# Patient Record
Sex: FEMALE | Race: ASIAN
[De-identification: names, ages, dates, MRNs, and addresses within clinical notes are randomized per-mention and may not be internally consistent; named-entity substitution may affect disease eponyms.]

---

## 2017-03-22 ENCOUNTER — FORM ENCOUNTER (OUTPATIENT)
Age: 72
End: 2017-03-22

## 2017-03-23 ENCOUNTER — APPOINTMENT (OUTPATIENT)
Dept: MAMMOGRAPHY | Facility: CLINIC | Age: 72
End: 2017-03-23

## 2017-03-23 ENCOUNTER — OUTPATIENT (OUTPATIENT)
Dept: OUTPATIENT SERVICES | Facility: HOSPITAL | Age: 72
LOS: 1 days | End: 2017-03-23
Payer: MEDICARE

## 2017-03-23 DIAGNOSIS — Z00.8 ENCOUNTER FOR OTHER GENERAL EXAMINATION: ICD-10-CM

## 2017-03-23 PROCEDURE — 77067 SCR MAMMO BI INCL CAD: CPT

## 2017-03-23 PROCEDURE — 77063 BREAST TOMOSYNTHESIS BI: CPT

## 2018-02-06 ENCOUNTER — APPOINTMENT (OUTPATIENT)
Dept: ULTRASOUND IMAGING | Facility: CLINIC | Age: 73
End: 2018-02-06
Payer: MEDICARE

## 2018-02-06 ENCOUNTER — OUTPATIENT (OUTPATIENT)
Dept: OUTPATIENT SERVICES | Facility: HOSPITAL | Age: 73
LOS: 1 days | End: 2018-02-06
Payer: MEDICARE

## 2018-02-06 ENCOUNTER — APPOINTMENT (OUTPATIENT)
Dept: MAMMOGRAPHY | Facility: CLINIC | Age: 73
End: 2018-02-06
Payer: MEDICARE

## 2018-02-06 DIAGNOSIS — Z00.8 ENCOUNTER FOR OTHER GENERAL EXAMINATION: ICD-10-CM

## 2018-02-06 PROCEDURE — 76641 ULTRASOUND BREAST COMPLETE: CPT | Mod: 26,LT

## 2018-02-06 PROCEDURE — 76641 ULTRASOUND BREAST COMPLETE: CPT

## 2018-02-06 PROCEDURE — G0279: CPT | Mod: 26

## 2018-02-06 PROCEDURE — 77066 DX MAMMO INCL CAD BI: CPT | Mod: 26

## 2018-02-06 PROCEDURE — G0279: CPT

## 2018-02-06 PROCEDURE — 77066 DX MAMMO INCL CAD BI: CPT

## 2019-03-05 ENCOUNTER — APPOINTMENT (OUTPATIENT)
Dept: ULTRASOUND IMAGING | Facility: CLINIC | Age: 74
End: 2019-03-05
Payer: MEDICARE

## 2019-03-05 ENCOUNTER — OUTPATIENT (OUTPATIENT)
Dept: OUTPATIENT SERVICES | Facility: HOSPITAL | Age: 74
LOS: 1 days | End: 2019-03-05
Payer: MEDICARE

## 2019-03-05 ENCOUNTER — APPOINTMENT (OUTPATIENT)
Dept: RADIOLOGY | Facility: CLINIC | Age: 74
End: 2019-03-05
Payer: MEDICARE

## 2019-03-05 ENCOUNTER — APPOINTMENT (OUTPATIENT)
Dept: MAMMOGRAPHY | Facility: CLINIC | Age: 74
End: 2019-03-05
Payer: MEDICARE

## 2019-03-05 DIAGNOSIS — Z00.8 ENCOUNTER FOR OTHER GENERAL EXAMINATION: ICD-10-CM

## 2019-03-05 PROCEDURE — 77080 DXA BONE DENSITY AXIAL: CPT | Mod: 26

## 2019-03-05 PROCEDURE — 77063 BREAST TOMOSYNTHESIS BI: CPT | Mod: 26

## 2019-03-05 PROCEDURE — 76641 ULTRASOUND BREAST COMPLETE: CPT | Mod: 26,50

## 2019-03-05 PROCEDURE — 76641 ULTRASOUND BREAST COMPLETE: CPT

## 2019-03-05 PROCEDURE — 77063 BREAST TOMOSYNTHESIS BI: CPT

## 2019-03-05 PROCEDURE — 77067 SCR MAMMO BI INCL CAD: CPT | Mod: 26

## 2019-03-05 PROCEDURE — 77080 DXA BONE DENSITY AXIAL: CPT

## 2019-03-05 PROCEDURE — 77067 SCR MAMMO BI INCL CAD: CPT

## 2019-07-04 ENCOUNTER — INPATIENT (INPATIENT)
Facility: HOSPITAL | Age: 74
LOS: 4 days | Discharge: HOME | End: 2019-07-09
Attending: INTERNAL MEDICINE | Admitting: INTERNAL MEDICINE
Payer: MEDICARE

## 2019-07-04 VITALS — HEIGHT: 62.5 IN | WEIGHT: 149.91 LBS

## 2019-07-04 DIAGNOSIS — I44.1 ATRIOVENTRICULAR BLOCK, SECOND DEGREE: ICD-10-CM

## 2019-07-04 LAB
ADD ON TEST-SPECIMEN IN LAB: SIGNIFICANT CHANGE UP
ADD ON TEST-SPECIMEN IN LAB: SIGNIFICANT CHANGE UP
ALBUMIN SERPL ELPH-MCNC: 3.1 G/DL — LOW (ref 3.3–5)
ALP SERPL-CCNC: 139 U/L — HIGH (ref 40–120)
ALT FLD-CCNC: 56 U/L — SIGNIFICANT CHANGE UP (ref 12–78)
ANION GAP SERPL CALC-SCNC: 13 MMOL/L — SIGNIFICANT CHANGE UP (ref 5–17)
APPEARANCE UR: CLEAR — SIGNIFICANT CHANGE UP
APTT BLD: 34 SEC — SIGNIFICANT CHANGE UP (ref 27.5–36.3)
AST SERPL-CCNC: 47 U/L — HIGH (ref 15–37)
B BURGDOR C6 AB SER-ACNC: NEGATIVE — SIGNIFICANT CHANGE UP
B BURGDOR IGG+IGM SER-ACNC: 0.04 INDEX — SIGNIFICANT CHANGE UP (ref 0.01–0.89)
BACTERIA # UR AUTO: ABNORMAL
BASOPHILS # BLD AUTO: 0.02 K/UL — SIGNIFICANT CHANGE UP (ref 0–0.2)
BASOPHILS NFR BLD AUTO: 0.2 % — SIGNIFICANT CHANGE UP (ref 0–2)
BILIRUB SERPL-MCNC: 0.5 MG/DL — SIGNIFICANT CHANGE UP (ref 0.2–1.2)
BILIRUB UR-MCNC: NEGATIVE — SIGNIFICANT CHANGE UP
BUN SERPL-MCNC: 81 MG/DL — HIGH (ref 7–23)
CALCIUM SERPL-MCNC: 9.1 MG/DL — SIGNIFICANT CHANGE UP (ref 8.5–10.1)
CHLORIDE SERPL-SCNC: 105 MMOL/L — SIGNIFICANT CHANGE UP (ref 96–108)
CO2 SERPL-SCNC: 22 MMOL/L — SIGNIFICANT CHANGE UP (ref 22–31)
COLOR SPEC: YELLOW — SIGNIFICANT CHANGE UP
CREAT ?TM UR-MCNC: 94 MG/DL — SIGNIFICANT CHANGE UP
CREAT SERPL-MCNC: 5.51 MG/DL — HIGH (ref 0.5–1.3)
DIFF PNL FLD: ABNORMAL
EOSINOPHIL # BLD AUTO: 0.01 K/UL — SIGNIFICANT CHANGE UP (ref 0–0.5)
EOSINOPHIL NFR BLD AUTO: 0.1 % — SIGNIFICANT CHANGE UP (ref 0–6)
EPI CELLS # UR: SIGNIFICANT CHANGE UP
ERYTHROCYTE [SEDIMENTATION RATE] IN BLOOD: 95 MM/HR — HIGH (ref 0–20)
GLUCOSE SERPL-MCNC: 131 MG/DL — HIGH (ref 70–99)
GLUCOSE UR QL: NEGATIVE MG/DL — SIGNIFICANT CHANGE UP
HCT VFR BLD CALC: 35.8 % — SIGNIFICANT CHANGE UP (ref 34.5–45)
HGB BLD-MCNC: 11.5 G/DL — SIGNIFICANT CHANGE UP (ref 11.5–15.5)
IMM GRANULOCYTES NFR BLD AUTO: 0.4 % — SIGNIFICANT CHANGE UP (ref 0–1.5)
INR BLD: 1.11 RATIO — SIGNIFICANT CHANGE UP (ref 0.88–1.16)
KETONES UR-MCNC: NEGATIVE — SIGNIFICANT CHANGE UP
LEUKOCYTE ESTERASE UR-ACNC: ABNORMAL
LYMPHOCYTES # BLD AUTO: 0.69 K/UL — LOW (ref 1–3.3)
LYMPHOCYTES # BLD AUTO: 8.4 % — LOW (ref 13–44)
MAGNESIUM SERPL-MCNC: 2.4 MG/DL — SIGNIFICANT CHANGE UP (ref 1.6–2.6)
MCHC RBC-ENTMCNC: 29 PG — SIGNIFICANT CHANGE UP (ref 27–34)
MCHC RBC-ENTMCNC: 32.1 GM/DL — SIGNIFICANT CHANGE UP (ref 32–36)
MCV RBC AUTO: 90.4 FL — SIGNIFICANT CHANGE UP (ref 80–100)
MONOCYTES # BLD AUTO: 0.76 K/UL — SIGNIFICANT CHANGE UP (ref 0–0.9)
MONOCYTES NFR BLD AUTO: 9.3 % — SIGNIFICANT CHANGE UP (ref 2–14)
NEUTROPHILS # BLD AUTO: 6.68 K/UL — SIGNIFICANT CHANGE UP (ref 1.8–7.4)
NEUTROPHILS NFR BLD AUTO: 81.6 % — HIGH (ref 43–77)
NITRITE UR-MCNC: NEGATIVE — SIGNIFICANT CHANGE UP
NT-PROBNP SERPL-SCNC: 8937 PG/ML — HIGH (ref 0–450)
PH UR: 5 — SIGNIFICANT CHANGE UP (ref 5–8)
PLATELET # BLD AUTO: 199 K/UL — SIGNIFICANT CHANGE UP (ref 150–400)
POTASSIUM SERPL-MCNC: 4.4 MMOL/L — SIGNIFICANT CHANGE UP (ref 3.5–5.3)
POTASSIUM SERPL-SCNC: 4.4 MMOL/L — SIGNIFICANT CHANGE UP (ref 3.5–5.3)
PROT ?TM UR-MCNC: 37 MG/DL — HIGH (ref 0–12)
PROT SERPL-MCNC: 7.5 GM/DL — SIGNIFICANT CHANGE UP (ref 6–8.3)
PROT UR-MCNC: 30 MG/DL
PROT/CREAT UR-RTO: 0.4 RATIO — HIGH (ref 0–0.2)
PROTHROM AB SERPL-ACNC: 12.4 SEC — SIGNIFICANT CHANGE UP (ref 10–12.9)
RBC # BLD: 3.96 M/UL — SIGNIFICANT CHANGE UP (ref 3.8–5.2)
RBC # FLD: 12.5 % — SIGNIFICANT CHANGE UP (ref 10.3–14.5)
RBC CASTS # UR COMP ASSIST: ABNORMAL /HPF (ref 0–4)
SODIUM SERPL-SCNC: 140 MMOL/L — SIGNIFICANT CHANGE UP (ref 135–145)
SP GR SPEC: 1.01 — SIGNIFICANT CHANGE UP (ref 1.01–1.02)
TROPONIN I SERPL-MCNC: <0.015 NG/ML — SIGNIFICANT CHANGE UP (ref 0.01–0.04)
TROPONIN I SERPL-MCNC: <0.015 NG/ML — SIGNIFICANT CHANGE UP (ref 0.01–0.04)
TSH SERPL-MCNC: 0.86 UU/ML — SIGNIFICANT CHANGE UP (ref 0.34–4.82)
UROBILINOGEN FLD QL: NEGATIVE MG/DL — SIGNIFICANT CHANGE UP
WBC # BLD: 8.19 K/UL — SIGNIFICANT CHANGE UP (ref 3.8–10.5)
WBC # FLD AUTO: 8.19 K/UL — SIGNIFICANT CHANGE UP (ref 3.8–10.5)
WBC UR QL: ABNORMAL

## 2019-07-04 PROCEDURE — 71045 X-RAY EXAM CHEST 1 VIEW: CPT | Mod: 26

## 2019-07-04 PROCEDURE — 99221 1ST HOSP IP/OBS SF/LOW 40: CPT

## 2019-07-04 PROCEDURE — 93306 TTE W/DOPPLER COMPLETE: CPT | Mod: 26

## 2019-07-04 PROCEDURE — 76770 US EXAM ABDO BACK WALL COMP: CPT | Mod: 26

## 2019-07-04 PROCEDURE — 99291 CRITICAL CARE FIRST HOUR: CPT

## 2019-07-04 PROCEDURE — 93010 ELECTROCARDIOGRAM REPORT: CPT

## 2019-07-04 RX ORDER — CEFAZOLIN SODIUM 1 G
2000 VIAL (EA) INJECTION ONCE
Refills: 0 | Status: COMPLETED | OUTPATIENT
Start: 2019-07-05 | End: 2019-07-05

## 2019-07-04 RX ORDER — SODIUM CHLORIDE 9 MG/ML
1000 INJECTION INTRAMUSCULAR; INTRAVENOUS; SUBCUTANEOUS
Refills: 0 | Status: DISCONTINUED | OUTPATIENT
Start: 2019-07-04 | End: 2019-07-05

## 2019-07-04 RX ORDER — ATORVASTATIN CALCIUM 80 MG/1
40 TABLET, FILM COATED ORAL AT BEDTIME
Refills: 0 | Status: DISCONTINUED | OUTPATIENT
Start: 2019-07-04 | End: 2019-07-05

## 2019-07-04 RX ORDER — ACETAMINOPHEN 500 MG
650 TABLET ORAL ONCE
Refills: 0 | Status: COMPLETED | OUTPATIENT
Start: 2019-07-04 | End: 2019-07-04

## 2019-07-04 RX ORDER — ALBUTEROL 90 UG/1
2 AEROSOL, METERED ORAL EVERY 6 HOURS
Refills: 0 | Status: DISCONTINUED | OUTPATIENT
Start: 2019-07-04 | End: 2019-07-05

## 2019-07-04 RX ORDER — ACETAMINOPHEN 500 MG
650 TABLET ORAL EVERY 6 HOURS
Refills: 0 | Status: DISCONTINUED | OUTPATIENT
Start: 2019-07-04 | End: 2019-07-05

## 2019-07-04 RX ADMIN — Medication 650 MILLIGRAM(S): at 17:58

## 2019-07-04 RX ADMIN — SODIUM CHLORIDE 100 MILLILITER(S): 9 INJECTION INTRAMUSCULAR; INTRAVENOUS; SUBCUTANEOUS at 22:00

## 2019-07-04 RX ADMIN — SODIUM CHLORIDE 100 MILLILITER(S): 9 INJECTION INTRAMUSCULAR; INTRAVENOUS; SUBCUTANEOUS at 12:15

## 2019-07-04 RX ADMIN — ATORVASTATIN CALCIUM 40 MILLIGRAM(S): 80 TABLET, FILM COATED ORAL at 21:15

## 2019-07-04 NOTE — ED PROVIDER NOTE - CLINICAL SUMMARY MEDICAL DECISION MAKING FREE TEXT BOX
73F hx htn hld asthma presents to the ED for shortness of breath and weakness. Pt states that shortness of breath is present with exertion. states that when she walks to the mailbox she has to stop to catch her breath. mild cough. no sob when laying flat. no cp. no had or dizziness. no diaphoresis. states that she is having work done in her kitchen and there is dust. also states that 1 month ago she was bit by a tick but received doxycycline 1 dose. exam with bradycardic no LE swelling normal peripheral pulses. son at bedside is a physician at . spoke with patient and son regarding care - given new bradycardia concern for cardiac etiology. 1st degree block - will send lyme and silvia, touch base with cardiologist. lower suspicion asthma as normal breath sounds no wheezing not tight. will obtain xray cardiac monitor and reassess. Arya López M.D., Attending Physician

## 2019-07-04 NOTE — CONSULT NOTE ADULT - PROBLEM SELECTOR RECOMMENDATION 9
Admit to CCU via Hospitalist team  Serial enzymes/ EKGs/ labs/ monitoring/ lymes titer ordered and pending  Renal consultation initiated and in progress  Cardiology-Dr Wilhelm at bedside  R-2 pads to transcutaneous pacer at bedside, atropine prn  NPO after midnight for possible PPM implantation  IV fluids and hydration in progress  Echo ordered by Dr Wilhelm  Continue cardiac monitoring and CCU support  Will Discuss all of above with EP attending  Will continue to follow

## 2019-07-04 NOTE — ED ADULT TRIAGE NOTE - CHIEF COMPLAINT QUOTE
Patient presents complaining of shortness of breath, weakness, and "getting tired easily". Patient had tick bite on 6/14 and states that is when weakness and fatigue started. Patient states shortness of breath started 3 days ago and she states she thinks it may be due to the construction at her house and the dust. hx asthma.

## 2019-07-04 NOTE — ED PROVIDER NOTE - PROGRESS NOTE DETAILS
repeat EKG - pt in 3rd degree heart block - pads placed on patient. bp stable. cardiology paged. Arya López M.D., Attending Physician spoke with Dr. Wilhelm will see patient shortly. Arya López M.D., Attending Physician pt endorsed to DR. Pike for 3rd degree block pt to go to CCU. Arya López M.D., Attending Physician spoke with EP will see patient shortly. Arya López M.D., Attending Physician

## 2019-07-04 NOTE — CONSULT NOTE ADULT - ASSESSMENT
73 year old woman with the above history admitted with symptomatic heart block but also with new onset acute renal insufficiency.  This may be related to ATN perhaps from hypotension related to her heart block and some degree of intravascular depletion along with her ARB therapy.  Will give IV hydration and hold hyzaar.  If creatinine improves by tomorrow would then proceed with PPM implantation for her heart block.  Check TTE

## 2019-07-04 NOTE — CONSULT NOTE ADULT - SUBJECTIVE AND OBJECTIVE BOX
· Chief Complaint: The patient is a 73y Female complaining of shortness of breath.	  · HPI Objective Statement: 73F hx htn hld asthma presents to the ED for shortness of breath and weakness. Pt states that shortness of breath is present with exertion. states that when she walks to the mailbox she has to stop to catch her breath. mild cough. no sob when laying flat. no cp. no had or dizziness. no diaphoresis. states that she is having work done in her kitchen and there is dust. also states that 1 month ago she was bit by a tick but received doxycycline 1 dose.	    PAST MEDICAL/SURGICAL/FAMILY/SOCIAL HISTORY:     Hypertension  Asthma  Dyslipidemia  Moderate bilateral carotid disease   Alcohol Use History:  · Have you ever consumed alcohol	never	     Tobacco Usage:  · Tobacco Usage	Never smoker	    ALLERGIES AND HOME MEDICATIONS:   Allergies:        Allergies:  	No Known Allergies:     Home Medications:   * Outpatient Medication Status not yet specified    REVIEW OF SYSTEMS:    Review of Systems:  · Review of Systems: Constitutional: No fever or chills  Eyes: No visual changes  HEENT: No throat pain  CV: No chest pain  Resp: + SOB + cough  GI: No abd pain, nausea or vomiting  : No dysuria  MSK: No musculoskeletal pain  Skin: No rash Neuro: No headache	    PHYSICAL EXAM:   · Physical Examination: Constitutional: NAD AAOx3  Eyes: PERRLA EOMI  Head: Normocephalic atraumatic  Mouth: MMM  Cardiac: bradycardic no LE swelling normal peripheral pulses  Resp: Lungs CTAB  GI: Abd s/nt/nd  Neuro: CN2-12 intact Skin: No rashes	    CURRENT ORDERS/:   · 	Activity - Bedrest, 04-Jul-2019, Active, Standard  · 	Cardiac Monitor Bedside,   Time/Priority:  STAT, 04-Jul-2019, Active, Standard  · 	IV Insert,   Time/Priority:  STAT, 04-Jul-2019, Active, Standard  · 	Pulse Oximetry,   Frequency: <Continuous>  	  Additional Instructions:  Notify Provider if oxygen saturation is LESS THAN 92%, 04-Jul-2019, Active, Standard  · 	Vital Signs,   Frequency:  per routine, 04-Jul-2019, Active, Standard    RESULTS:   · EKG Date/Time: 04-Jul-2019 08:25	  · Rate: 42	  · Interpretation: abnormal	  · Abnormal Rhythm: bradycardia	  · Acute or Evolving MI?: no	  · Axis: Right Deviation	  · PA: 302	  · QRS: 88	  · ST/T Wave: no st elevations	  · Other Findings: 1st degree av block	  · EKG Date/Time: 04-Jul-2019 08:58	  · Rate: 39	  · Interpretation: abnormal	  · Abnormal Rhythm: 3rd degree heart block	  · Acute or Evolving MI?: no	  · Axis: Normal	  · PA: 98	  · QRS: 88	  · ST/T Wave: no st elevations	    PROGRESS NOTE:   Date: 04-Jul-2019 08:59.     Progress: repeat EKG - pt in 3rd degree heart block - pads placed on patient. bp stable. cardiology paged. Arya López M.D., Attending Physician.     Date: 04-Jul-2019 09:07.     Progress: spoke with Dr. Wilhelm will see patient shortly. Arya López M.D., Attending Physician.     Date: 04-Jul-2019 09:14.     Progress: pt endorsed to DR. Pike for 3rd degree block pt to go to CCU. Arya López M.D., Attending Physician.     Date: 04-Jul-2019 09:20.     Progress: spoke with EP will see patient shortly. Arya López M.D., Attending Physician.    DISPOSITION:    Care Plan - Instructions:  Principal Discharge DX:	Exertional dyspnea  Secondary Diagnosis:	Heart block AV third degree.     Impression:  Principal Discharge Dx Exertional dyspnea.     Secondary Discharge Dx Heart block AV third degree.     Medical Decision Making:  · Physician E/M Selection	[id number] Critical Care - 30 to 74 minutes	  · The following orders were submitted:	Labs, EKG, Imaging Studies, Medications	  · EKG - Performed independent visualization (See EKG Result section above)	Yes	  · Critical Care Indication	patient was critically ill...	  · Critical Care Indication	Patient was critically ill with a high probability of imminent or life threatening deterioration.	  · Critical Care Provided	direct patient care (not related to procedure), additional history taking, interpretation of diagnostic studies, documentation, consultation with other physicians	  · Critical Care Attestation	I have personally provided the amount of critical care time documented below excluding time spent on separate procedures	  · Crtical Care Time Spent (min) Must be 30 or more minutes to qualify:	30	  · Clinical Summary  (MDM): Summarize the clinical encounter	73F hx htn hld asthma presents to the ED for shortness of breath and weakness. Pt states that shortness of breath is present with exertion. states that when she walks to the mailbox she has to stop to catch her breath. mild cough. no sob when laying flat. no cp. no had or dizziness. no diaphoresis. states that she is having work done in her kitchen and there is dust. also states that 1 month ago she was bit by a tick but received doxycycline 1 dose. exam with bradycardic no LE swelling normal peripheral pulses. son at bedside is a physician at . spoke with patient and son regarding care - given new bradycardia concern for cardiac etiology. 1st degree block - will send lyme and trop, touch base with cardiologist. lower suspicion asthma as normal breath sounds no wheezing not tight. will obtain xray cardiac monitor and reassess. Arya López M.D., Attending Physician	        Accepting Physician:   Provider Role	Provider Name	  · Accepting Physician	Froilan Pike	    Prescriptions:   * Outpatient Medication Status not yet specified    ATTESTATION STATEMENT:    Attestations Statements:  Attending Statement: Attending Only.    PROVIDER CARE INITIATION:   · Care Initiated by:	Arya López(Attending)	  · Provider Care Initiated at:	04-Jul-2019 08:01	      Electronic Signatures:  Arya López)  (Signed 04-Jul-2019 09:21)  	Authored: HISTORY OF PRESENT ILLNESS, PAST MEDICAL/SURGICAL/FAMILY/SOCIAL HISTORY, ALLERGIES AND HOME MEDICATIONS, REVIEW OF SYSTEMS, PHYSICAL EXAM, CURRENT ORDERS/, RESULTS, PROGRESS NOTE, DISPOSITION, ATTESTATION STATEMENT, PROVIDER CARE INITIATION      Last Updated: 04-Jul-2019 09:21 by Arya López)    References:  1.  Data Referenced From "ED ADULT Triage Note" 7/4/2019 7:55 AM                REVIEW OF SYSTEMS:    As above  No chest pain + shortness of breath  No lightheadeness or syncope  No leg swelling  No palpitations  No claudication-like symptoms    Vital Signs Last 24 Hrs  T(C): 36.5 (04 Jul 2019 08:00), Max: 36.5 (04 Jul 2019 08:00)  T(F): 97.7 (04 Jul 2019 08:00), Max: 97.7 (04 Jul 2019 08:00)  HR: 41 (04 Jul 2019 08:00) (41 - 41)  BP: 159/59 (04 Jul 2019 08:00) (159/59 - 159/59)  BP(mean): --  RR: 15 (04 Jul 2019 08:00) (15 - 15)  SpO2: 97% (04 Jul 2019 08:00) (97% - 97%)    I&O's Summary      CAPILLARY BLOOD GLUCOSE          PHYSICAL EXAM:   Patient in NAD  Neck: No JVD; Carotids:  2+ without bruits  Respiratory:  Clear to A&P  Cardiovascular: S1 and S2, regular rate/bradykardic and rhythm; + MICHELLE  Gastrointestinal:  Soft, non-tender; BS positive  Extremities: No peripheral edema  Vascular: 2+ peripheral pulses  Neurological: A/O x 3, no focal deficits      MEDICATIONS:  MEDICATIONS  (STANDING):  atorvastatin 40 milliGRAM(s) Oral at bedtime  sodium chloride 0.9%. 1000 milliLiter(s) (100 mL/Hr) IV Continuous <Continuous>      LABS: All Labs Reviewed:  Blood Culture:   BNP Serum Pro-Brain Natriuretic Peptide: 8937 pg/mL (07-04 @ 08:30)    CBC             WBC Count: 8.19 K/uL (07-04 @ 08:30)              Hemoglobin: 11.5 g/dL (07-04 @ 08:30)              Hematocrit: 35.8 % (07-04 @ 08:30)              Mean Cell Volume: 90.4 fl (07-04 @ 08:30)              Platelet Count - Automated: 199 K/uL (07-04 @ 08:30)                            Cardiac markers             Troponin I, Serum: <0.015 ng/mL (07-04 @ 08:30)                             Chems        Sodium, Serum: 140 mmol/L (07-04 @ 08:30)          Potassium, Serum: 4.4 mmol/L (07-04 @ 08:30)          Blood Urea Nitrogen, Serum: 81 mg/dL (07-04 @ 08:30)          Creatinine 5.51          Magnesium, Serum: 2.4 mg/dL (07-04 @ 08:30)          Protein Total, Serum: 7.5 gm/dL (07-04 @ 08:30)                  Calcium, Total Serum: 9.1 mg/dL (07-04 @ 08:30)                  Bilirubin Total, Serum: 0.5 mg/dL (07-04 @ 08:30)          Alanine Aminotransferase (ALT/SGPT): 56 U/L (07-04 @ 08:30)          Aspartate Aminotransferase (AST/SGOT): 47 U/L (07-04 @ 08:30)                 INR: 1.11 ratio (07-04 @ 08:30)             RADIOLOGY:    EKG:  Sinus rhythm; 2:1 AV block; old ASWMI(unchanged)    Telemetry:    ECHO:

## 2019-07-04 NOTE — ED PROVIDER NOTE - OBJECTIVE STATEMENT
73F hx htn hld asthma presents to the ED for shortness of breath and weakness. Pt states that shortness of breath is present with exertion. states that when she walks to the mailbox she has to stop to catch her breath. mild cough. no sob when laying flat. no cp. no had or dizziness. no diaphoresis. states that she is having work done in her kitchen and there is dust. also states that 1 month ago she was bit by a tick but received doxycycline 1 dose.

## 2019-07-04 NOTE — ED PROVIDER NOTE - PHYSICAL EXAMINATION
Constitutional: NAD AAOx3  Eyes: PERRLA EOMI  Head: Normocephalic atraumatic  Mouth: MMM  Cardiac: bradycardic no LE swelling normal peripheral pulses  Resp: Lungs CTAB  GI: Abd s/nt/nd  Neuro: CN2-12 intact  Skin: No rashes

## 2019-07-04 NOTE — H&P ADULT - HISTORY OF PRESENT ILLNESS
72 yo female h/o Htn, HLD, Mild Intermittent Asthma, presents to the ED for progressive dyspnea over the past few days; no fever/chills, no chest pain, no coughing.   she was found to have a 3rd degree heart block.  She also states that she was bitten by an insect 2 weeks ago, but didn't get a rash. A prophylactic dose of Doxy was given at the time.    PMHx: as above  PSHx: no sign surgeries  SocHx: no smoking, no Etoh  FamHx: brother  of MI age 60            REVIEW OF SYSTEMS:    CONSTITUTIONAL: No weakness, No fevers or chills  ENT: No ear ache, No sorethroat  NECK: No pain, No stiffness  RESPIRATORY: No cough, No wheezing, No hemoptysis; + dyspnea  CARDIOVASCULAR: No chest pain, No palpitations  GASTROINTESTINAL: No abd pain, No nausea, No vomiting, No hematemesis, No diarrhea or constipation. No melena, No hematochezia.  GENITOURINARY: No dysuria, No  hematuria  NEUROLOGICAL: No diplopia, No paresthesia, No motor dysfunction  MUSCULOSKELETAL: No arthralgia, No myalgia  SKIN: No rashes, or lesions   PSYCH: no anxiety, no suicidal ideation    All other review of systems is negative unless indicated above    Vital Signs Last 24 Hrs  T(C): 36.5 (2019 08:00), Max: 36.5 (2019 08:00)  T(F): 97.7 (2019 08:00), Max: 97.7 (2019 08:00)  HR: 41 (2019 08:00) (41 - 41)  BP: 159/59 (2019 08:00) (159/59 - 159/59)  BP(mean): --  RR: 15 (2019 08:00) (15 - 15)  SpO2: 97% (2019 08:00) (97% - 97%)    PHYSICAL EXAM:    GENERAL: NAD, Well nourished  HEENT:  NC/AT, EOMI, PERRLA, No scleral icterus, Moist mucous membranes  NECK: Supple, No JVD  CNS:  Alert & Oriented X3, Motor Strength 5/5 B/L upper and lower extremities; DTRs 2+ intact   LUNG: Normal Breath sounds, Clear to auscultation bilaterally, No rales, No rhonchi, No wheezing  HEART: RRR; No murmurs, No rubs  ABDOMEN: +BS, ST/ND/NT  GENITOURINARY: Voiding, Bladder not distended  EXTREMITIES:  2+ Peripheral Pulses, No clubbing, No cyanosis, No tibial edema  MUSCULOSKELTAL: Joints normal ROM, No TTP, No effusion  VAGINAL: deferred  SKIN: no rashes  RECTAL: deferred, not indicated  BREAST: deferred                          11.5   8.19  )-----------( 199      ( 2019 08:30 )             35.8     07-04    140  |  105  |  81<H>  ----------------------------<  131<H>  4.4   |  22  |  5.51<H>    Ca    9.1      2019 08:30  Mg     2.4     07-04    TPro  7.5  /  Alb  3.1<L>  /  TBili  0.5  /  DBili  x   /  AST  47<H>  /  ALT  56  /  AlkPhos  139<H>  07-04    Vancomycin levels:   Cultures:     MEDICATIONS  (STANDING):  atorvastatin 40 milliGRAM(s) Oral at bedtime  sodium chloride 0.9%. 1000 milliLiter(s) (100 mL/Hr) IV Continuous <Continuous>    MEDICATIONS  (PRN):  ALBUTerol    90 MICROgram(s) HFA Inhaler 2 Puff(s) Inhalation every 6 hours PRN Bronchospasm      Ekg: complete heart block  Cxray: cardiomegaly, increased vascular congestion    a/p:    1. Complete heart block:  admit to CCU   BP stable  transthoracic pacer pads   EP evaluation for PPM  check Lyme titer, TSH  TTE    2. ARF:  unclear etiology  will check Sonogram kidneys  gentle IV hydration  Renal consult 72 yo female h/o Htn, HLD, Mild Intermittent Asthma, presents to the ED for progressive dyspnea over the past few days; no fever/chills, no chest pain, no coughing.   she was found to have a 3rd degree heart block.  She also states that she was bitten by an insect 2 weeks ago, but didn't get a rash. A prophylactic dose of Doxy was given at the time.    PMHx: as above  PSHx: no sign surgeries  SocHx: no smoking, no Etoh  FamHx: brother  of MI age 60            REVIEW OF SYSTEMS:    CONSTITUTIONAL: No weakness, No fevers or chills  ENT: No ear ache, No sorethroat  NECK: No pain, No stiffness  RESPIRATORY: No cough, No wheezing, No hemoptysis; + dyspnea  CARDIOVASCULAR: No chest pain, No palpitations  GASTROINTESTINAL: No abd pain, No nausea, No vomiting, No hematemesis, No diarrhea or constipation. No melena, No hematochezia.  GENITOURINARY: No dysuria, No  hematuria  NEUROLOGICAL: No diplopia, No paresthesia, No motor dysfunction  MUSCULOSKELETAL: No arthralgia, No myalgia  SKIN: No rashes, or lesions   PSYCH: no anxiety, no suicidal ideation    All other review of systems is negative unless indicated above    Vital Signs Last 24 Hrs  T(C): 36.5 (2019 08:00), Max: 36.5 (2019 08:00)  T(F): 97.7 (2019 08:00), Max: 97.7 (2019 08:00)  HR: 41 (2019 08:00) (41 - 41)  BP: 159/59 (2019 08:00) (159/59 - 159/59)  BP(mean): --  RR: 15 (2019 08:00) (15 - 15)  SpO2: 97% (2019 08:00) (97% - 97%)    PHYSICAL EXAM:    GENERAL: NAD, Well nourished  HEENT:  NC/AT, EOMI, PERRLA, No scleral icterus, Moist mucous membranes  NECK: Supple, No JVD  CNS:  Alert & Oriented X3, Motor Strength 5/5 B/L upper and lower extremities; DTRs 2+ intact   LUNG: Normal Breath sounds, Clear to auscultation bilaterally, No rales, No rhonchi, No wheezing  HEART: RRR; No murmurs, No rubs  ABDOMEN: +BS, ST/ND/NT  GENITOURINARY: Voiding, Bladder not distended  EXTREMITIES:  2+ Peripheral Pulses, No clubbing, No cyanosis, No tibial edema  MUSCULOSKELTAL: Joints normal ROM, No TTP, No effusion  VAGINAL: deferred  SKIN: no rashes  RECTAL: deferred, not indicated  BREAST: deferred                          11.5   8.19  )-----------( 199      ( 2019 08:30 )             35.8     07-04    140  |  105  |  81<H>  ----------------------------<  131<H>  4.4   |  22  |  5.51<H>    Ca    9.1      2019 08:30  Mg     2.4     07-04    TPro  7.5  /  Alb  3.1<L>  /  TBili  0.5  /  DBili  x   /  AST  47<H>  /  ALT  56  /  AlkPhos  139<H>  07-04    Vancomycin levels:   Cultures:     MEDICATIONS  (STANDING):  atorvastatin 40 milliGRAM(s) Oral at bedtime  sodium chloride 0.9%. 1000 milliLiter(s) (100 mL/Hr) IV Continuous <Continuous>    MEDICATIONS  (PRN):  ALBUTerol    90 MICROgram(s) HFA Inhaler 2 Puff(s) Inhalation every 6 hours PRN Bronchospasm      Ekg: complete heart block  Cxray: cardiomegaly, increased vascular congestion    a/p:    1. Complete heart block:  admit to CCU   BP stable  transthoracic pacer pads   EP evaluation for PPM  check Lyme titer, babesia, ehrlichia serology, TSH  TTE    2. ARF:  unclear etiology  will check Sonogram kidneys  gentle IV hydration  Renal consult    3. Large pericardial effusion:  Cardiothoracic surgical consult  r/o rheumatological etiology: FARIDA, ESR, CRP, dsDNA Ab, RF

## 2019-07-04 NOTE — ED ADULT NURSE NOTE - OBJECTIVE STATEMENT
Pt states she has been SOB for a "few" days, she believes the SOB is from construction in her home. She also states she was bit by a tick 1 month ago. Pt color pale.

## 2019-07-04 NOTE — CHART NOTE - NSCHARTNOTEFT_GEN_A_CORE
Called by RN as patient complaining of L sided back pain. Patient seen and examined at bedside. Patient admitted with dyspnea, 3rd degree heart block. Case discussed with admitting hospitalist- patient noted to have moderate-large pericardial effusion on TTE (prelim read by tech)- CTS Dr. Nate White aware and plan for pericardial window in AM. Patient now complaining of L sided back pain. States she believes pain is more from placement of the pacer pads, and now feels better after pacer pads were moved and received tylenol. States pain feels more external and is reproducible with touch.      T(C): 36.5 (19 @ 16:19), Max: 36.5 (19 @ 08:00)  HR: 44 (19 @ 21:00) (32 - 85)  BP: 117/45 (19 @ 21:00) (94/52 - 159/59)  RR: 20 (19 @ 21:00) (13 - 24)  SpO2: 98% (19 @ 21:00) (80% - 100%)  Wt(kg): --    Physical :  Gen- NAD, ncat  Cardio - s+1,s+2, irregular heart rhythm  Lung - cta b/l, no wheeze, no rhonchi, no rales   Abdomen- +BS, NT/ND, no guarding, no rebound, no masses  Ext- no edema, 2+ pulses b/l  MSK: +reproducible L sided back pain- improving as per patient    LABS:                        11.5   8.19  )-----------( 199      ( 2019 08:30 )             35.8         140  |  105  |  81<H>  ----------------------------<  131<H>  4.4   |  22  |  5.51<H>    Ca    9.1      2019 08:30  Mg     2.4     -    TPro  7.5  /  Alb  3.1<L>  /  TBili  0.5  /  DBili  x   /  AST  47<H>  /  ALT  56  /  AlkPhos  139<H>      PT/INR - ( 2019 08:30 )   PT: 12.4 sec;   INR: 1.11 ratio         PTT - ( 2019 08:30 )  PTT:34.0 sec  Urinalysis Basic - ( 2019 16:00 )    Color: Yellow / Appearance: Clear / S.010 / pH: x  Gluc: x / Ketone: Negative  / Bili: Negative / Urobili: Negative mg/dL   Blood: x / Protein: 30 mg/dL / Nitrite: Negative   Leuk Esterase: Trace / RBC: 6-10 /HPF / WBC 6-10   Sq Epi: x / Non Sq Epi: Few / Bacteria: Occasional        CARDIAC MARKERS ( 2019 11:40 )  <0.015 ng/mL / x     / x     / x     / x      CARDIAC MARKERS ( 2019 08:30 )  <0.015 ng/mL / x     / x     / x     / x            Assessment/Plan  73yFemale admitted for dyspnea, 3rd degree heart block, found to have mod-large pericardial effusion, now complaining of L sided back pain  -Back pain likely musculoskeletal in nature given improvement with tylenol and removal of pacer pad from region and due to fact pain was reproducible to touch  -Continue to monitor patient closely  -Case discussed with admitting hospitalist and ICU as well

## 2019-07-04 NOTE — ED ADULT NURSE NOTE - CHPI ED NUR SYMPTOMS NEG
no fever/no nausea/no syncope/no chest pain/no diaphoresis/no vomiting/no dizziness/no back pain/no chills/no congestion

## 2019-07-04 NOTE — CONSULT NOTE ADULT - ASSESSMENT
73 with htn, hld, asthma presents to the ED for shortness of breath and weakness noted with bradycardia, renal evaluation of ABIOLA.  ABIOLA likely multifactorial from cardiac isues/heart block/bradycardia, ARB playing a role as well as nsaid use. Could have a pre-renal state as well.    ABIOLA  -IVF hydration to keep net positive  -Urine studies, lytes  -Formal renal songram results  -Further NSAID avoidance  -Agree hold arb  -Check tick born workup, ehrlichia titers    heart block  -CVS/telemetery  -THoracic for pericardial effusion    HTN  -ARB hold, bp stable  -IVF to improve map    thanks, will follow  d/c with son, RN and Dr Pike 73 with htn, hld, asthma presents to the ED for shortness of breath and weakness noted with bradycardia, renal evaluation of ABIOLA.  ABIOLA likely multifactorial from cardiac isues/heart block/bradycardia, ARB playing a role as well as nsaid use. Could have a pre-renal state as well.    ABIOLA  -IVF hydration to keep net positive  -Urine studies, lytes  -Formal renal songram results  -Further NSAID avoidance  -Agree hold arb  -Check tick born workup, ehrlichia titers  -No acute need for HD, may progress if function does not stabilze with medical measures and workup    heart block  -CVS/telemetery  -THoracic for pericardial effusion    HTN  -ARB hold, bp stable  -IVF to improve map    thanks, will follow  d/c with son, RN and Dr Pike

## 2019-07-04 NOTE — CONSULT NOTE ADULT - SUBJECTIVE AND OBJECTIVE BOX
Patient is a 73y old  Female who presents with a chief complaint of worsening SOB and fatigue for the past few days. Denies any c/o CP, Palpitations or syncope occurrences.     HPI: This is a 73 year old woman with a PMH of HTN, HL, asthma, normal EF, managed by Dr Wilhelm, presenting with worsening SOB and fatigue for the past few days. Son brought patient to ED, and pt found to be in high grade/  second degree type II Heart Block with a rate of 40. Narrow complex QRS. Lowest HR 37 on monitor.      PAST MEDICAL & SURGICAL HISTORY:  Recent tick bite 2 weeks ago (Lymes titer ordered and being obtained-although time frame a bit soon)  HTN  Hypercholesterolemia    PREVIOUS DIAGNOSTIC TESTING:      ECHO  FINDINGS: Normal EF        MEDICATIONS  (STANDING):  atorvastatin 40 milliGRAM(s) Oral at bedtime  sodium chloride 0.9%. 1000 milliLiter(s) (100 mL/Hr) IV Continuous <Continuous>    MEDICATIONS  (PRN):  ALBUTerol    90 MICROgram(s) HFA Inhaler 2 Puff(s) Inhalation every 6 hours PRN Bronchospasm      FAMILY HISTORY: Brother  at age 61 of massive MI, Parents both -had HTN      SOCIAL HISTORY:  , lives alone, denies tobacco or alcohol use. Denies elicit drug use. Son lives in area near by.    ROS:     A comprehensive review of systems was performed and pertinent items are noted in the history above. A detailed ROS is as follows:    Constitutional	     Negative for anorexia, appetite changes, chills, fatigue, fevers, malaise, sweats and weight gain / loss.  Eyes: 	                         Negative for icterus, irritation, redness and visual disturbance.  ENT, mouth and face:	     Negative for ear discharge, earaches, hearing loss, tinnitus,  epistaxis, nasal congestion, snoring, sleep apnea, oral sores, dental and gum infection, sore throat hoarseness or voice change.  Neck:	                         Negative for thyroid enlargement, neck pain, swollen glands and difficulty in swallowing  Respiratory:                       Negative for asthma, chronic bronchitis, cough, dyspnea on exertion, emphysema, hemoptysis, pleurisy/chest pain, pneumonia, sputum, stridor and wheezing  Cardiovascular:                  Negative for chest pain, dyspnea, fatigue, irregular heart beat, near-syncope, orthopnea, palpitations, paroxysmal nocturnal dyspnea and syncope  Gastrointestinal:	      Negative for abdominal pain, nausea, vomiting, change in bowel habits, constipation, diarrhea, dyspepsia, dysphagia, odynophagia, reflux symptoms, jaundice, hematemesis, melena and hematochezia.  Genitourinary:	      Negative for genital lesions, discharge, bleeding, sexual problems, dysuria, frequency, hematuria and urinary incontinence.  Skin / Breast: 	      Negative for breast lump, breast tenderness. Negative for skin rash, redness, pruritis, swelling dryness and fissures.  Hematologic/lymphatic:   Negative for bleeding disorder, clotting disorder, petechial rash, easy bruising and lymphadenopathy.  Musculoskeletal:	      Negative for arthralgias, back pain, bone pain, muscle weakness, myalgias, neck pain and stiff joints  Vascular:	                          No leg pain, cramps, discoloration or edema.   Neurological:	      Negative for coordination problems, dizziness, gait problems, headaches, memory problems, paresthesia, seizures, speech problems, tremors, vertigo and weakness  Behavioral/Psych: 	      Negative for mood change, depression, anxiety, suicidal attempts.  Endocrine:	                          Negative for blurry vision, increased fatigue, polydipsia, polyphagia, polyuria, poor wound healing, pruritus, skin dryness and weight loss, fertility problems and temperature intolerance.  Allergic/Immunologic:	      Negative for anaphylaxis, angioedema and urticaria.      Vital Signs Last 24 Hrs  T(C): 36.5 (2019 08:00), Max: 36.5 (2019 08:00)  T(F): 97.7 (2019 08:00), Max: 97.7 (2019 08:00)  HR: 41 (2019 08:00) (41 - 41)  BP: 159/59 (2019 08:00) (159/59 - 159/59)  BP(mean): --  RR: 15 (2019 08:00) (15 - 15)  SpO2: 97% (2019 08:00) (97% - 97%)    P.E.   General -a well developed female in no acute distress, resting comfortably in bed  Neuro And O x3, GIANG, PERRL, EOM intact  Neck-Supple, negative JVD  Lungs-CTA no wheezes, poor air entry-diminshed at bases  CV-S1S2 RRR Grade 2/6 Holosytolic murmur detected  Abd-NABS, softly distended  Ext-no pedal edema   Skin-warm, dry , and intact      INTERPRETATION OF TELEMETRY: Second degree HB type 2 with HR 38-40      ECG: Second degree HB, underlying Sinus, narrow complex QRS HR 40        LABS:                          11.5   8.19  )-----------( 199      ( 2019 08:30 )             35.8     07-    140  |  105  |  81<H>  ----------------------------<  131<H>  4.4   |  22  |  5.51<H>    Ca    9.1      2019 08:30  Mg     2.4         TPro  7.5  /  Alb  3.1<L>  /  TBili  0.5  /  DBili  x   /  AST  47<H>  /  ALT  56  /  AlkPhos  139<H>  07    CARDIAC MARKERS ( 2019 08:30 )  <0.015 ng/mL / x     / x     / x     / x            Pro BNP  8937  @ 08:30  D Dimer  --  @ 08:30    PT/INR - ( 2019 08:30 )   PT: 12.4 sec;   INR: 1.11 ratio         PTT - ( 2019 08:30 )  PTT:34.0 sec

## 2019-07-04 NOTE — ED PROVIDER NOTE - NS ED ROS FT
Constitutional: No fever or chills  Eyes: No visual changes  HEENT: No throat pain  CV: No chest pain  Resp: + SOB + cough  GI: No abd pain, nausea or vomiting  : No dysuria  MSK: No musculoskeletal pain  Skin: No rash  Neuro: No headache

## 2019-07-04 NOTE — CONSULT NOTE ADULT - SUBJECTIVE AND OBJECTIVE BOX
Patient is a 73y Female whom presented to the hospital with htn, hld, asthma presents to the ED for shortness of breath and weakness noted with bradycardia, renal evaluation of ABIOLA.  Pt states that shortness of breath is present with exertion, KAUFFMAN, mild cough. States that 1 month ago she was bit by a tick but received doxycycline 1 dose. Son is a physican and was abroad, noted the progressive symptoms and bradycardia so brought to the ED. Noted echo with PE, base creatinine 1.3, now 5's. Does report alleve x 2 doses this past week for fever.     PAST MEDICAL & SURGICAL HISTORY:  Hypertension, unspecified type      MEDICATIONS  (STANDING):  atorvastatin 40 milliGRAM(s) Oral at bedtime  sodium chloride 0.9%. 1000 milliLiter(s) (100 mL/Hr) IV Continuous <Continuous>    MEDICATIONS  (PRN):  ALBUTerol    90 MICROgram(s) HFA Inhaler 2 Puff(s) Inhalation every 6 hours PRN Bronchospasm      Allergies    No Known Allergies    Intolerances        SOCIAL HISTORY:  no etoh/cigg    FAMILY HISTORY:  no renal disease    REVIEW OF SYSTEMS:    CONSTITUTIONAL: + weakness, intermittent hx of fevers or chills  EYES/ENT: No visual changes;  No vertigo or throat pain   NECK: No pain or stiffness  RESPIRATORY: No cough, wheezing, hemoptysis; No shortness of breath  CARDIOVASCULAR: No chest pain or palpitations  GASTROINTESTINAL: No abdominal or epigastric pain. No nausea, vomiting, or hematemesis; No diarrhea or constipation. No melena or hematochezia.  GENITOURINARY: No dysuria, frequency or hematuria  NEUROLOGICAL: No numbness or weakness  SKIN: No itching, burning, rashes, or lesions   All other review of systems is negative unless indicated above.      T(C): , Max: 36.5 (07-04-19 @ 08:00)  T(F): , Max: 97.7 (07-04-19 @ 08:00)  HR: 85 (07-04-19 @ 12:00)  BP: 107/91 (07-04-19 @ 12:00)  BP(mean): 95 (07-04-19 @ 12:00)  RR: 20 (07-04-19 @ 12:00)  SpO2: 87% (07-04-19 @ 12:00)  Wt(kg): --    07-04 @ 07:01 - 07-04 @ 13:46  --------------------------------------------------------  IN: 0 mL / OUT: 100 mL / NET: -100 mL      Height (cm): 158.75 (07-04 @ 07:55)  Weight (kg): 68 (07-04 @ 07:55)  BMI (kg/m2): 27 (07-04 @ 07:55)  BSA (m2): 1.7 (07-04 @ 07:55)    PHYSICAL EXAM:    Constitutional: NAD, well-groomed, well-developed  HEENT: PERRLA, EOMI,  MMM  Neck: No LAD, No JVD  Respiratory: CTAB  Cardiovascular: S1 and S2, RRR  Gastrointestinal: BS+, soft, NT/ND  Extremities: No peripheral edema  Neurological: A/O x 3, no focal deficits  Psychiatric: Normal mood, normal affect  : No Alfredo  Skin: No rashes  Access: Not applicable        LABS:                        11.5   8.19  )-----------( 199      ( 04 Jul 2019 08:30 )             35.8     04 Jul 2019 08:30    140    |  105    |  81     ----------------------------<  131    4.4     |  22     |  5.51     Ca    9.1        04 Jul 2019 08:30  Mg     2.4       04 Jul 2019 08:30    TPro  7.5    /  Alb  3.1    /  TBili  0.5    /  DBili  x      /  AST  47     /  ALT  56     /  AlkPhos  139    04 Jul 2019 08:30          Urine Studies:          RADIOLOGY & ADDITIONAL STUDIES:

## 2019-07-04 NOTE — CONSULT NOTE ADULT - ASSESSMENT
A 73 year old woman with profound persistent bradycardia & high grade HB ( second degree type 2) with Creatinine of 5.0 (Acute renal failure)

## 2019-07-04 NOTE — ED PROVIDER NOTE - NS ED MD DISPO DIVISION
Nurse's Notes                                                                                     

 [facility]                                                                 

Name: Ken Gloria                                                                                

Age: 79 yrs                                                                                       

Sex: Male                                                                                         

: 1940                                                                                   

MRN: [id number]                                                                                   

Arrival Date: 2019                                                                          

Time: 14:49                                                                                       

Account#: [id number]                                                                            

Bed 24                                                                                            

Private MD:                                                                                       

Diagnosis: Pneumonia, unspecified organism;Hyperkalemia-resolved                                  

                                                                                                  

Presentation:                                                                                     

                                                                                             

14:59 Presenting complaint: Wife states: [facility], when    sg  

      they thought he should be admitted for pneumonia due to the results of the chest xray,      

      pt reports nasal congestion and nasal drainage, cough and chest congestion for several      

      days. Transition of care: patient was not received from another setting of care. Resp       

      Distress? No respiratory distress is noted at this time. Onset of symptoms was 2019. Risk Assessment: Do you want to hurt yourself or someone else? Patient reports no     

      desire to harm self or others. Initial Sepsis Screen: Does the patient meet any 2           

      criteria? RR > 20 per min. Does the patient have a suspected source of infection? Yes:      

      Productive cough/pneumonia. Care prior to arrival: None.                                    

14:59 Method Of Arrival: Wheelchair                                                             

14:59 Acuity: DEWAYNE 2                                                                           sg  

                                                                                                  

Historical:                                                                                       

- Allergies:                                                                                      

15:02 No Known Allergies;                                                                     sg  

- Home Meds:                                                                                      

15:34 metoprolol tartrate 50 mg oral tab 2 times per day [Active]; Eliquis 2.5 mg oral tab 2  aj  

      times per day [Active]; bumetanide 2 mg Oral tab 1 tab 2 times per day [Active];            

      Ferrous Sulfate Oral twice a day [Active]; finasteride 5 mg oral tab once daily             

      [Active]; gabapentin 300 mg Oral cap 1 cap 2 times daily [Active]; Levemir FlexTouch 16     

      units subcutaneous twice a day [Active]; leflunomide 10 mg Oral tab 1 tab once daily        

      [Active]; prednisone 5 mg/5 mL Oral soln once daily [Active]; simvastatin 10 mg Oral        

      tab 1 tab once daily [Active]; spironolactone 25 mg oral tab once daily [Active];           

      tamsulosin 0.4 mg Oral cp24 1 cap once daily [Active]; tramadol 50 mg Oral tab 1 tab        

      every 6 hours [Active]; allopurinol 300 mg Oral tab 1 tab once daily [Active];              

- PMHx:                                                                                           

15:02 a-fib; CHF; Diabetes - IDDM; Myocardial infarction; Rheumatoid Arthritis;               sg  

- PSHx:                                                                                           

15:02 bilateral knees; kidney;                                                                sg  

                                                                                                  

- Immunization history:: Adult Immunizations up to date.                                          

- Social history:: Smoking status: unknown.                                                       

- Ebola Screening: : Patient negative for fever greater than or equal to 101.5 degrees            

  Fahrenheit, and additional compatible Ebola Virus Disease symptoms Patient denies               

  exposure to infectious person Patient denies travel to an Ebola-affected area in the            

  21 days before illness onset No symptoms or risks identified at this time.                      

                                                                                                  

                                                                                                  

Screening:                                                                                        

15:26 Abuse screen: Denies threats or abuse. Denies injuries from another. Nutritional        aj  

      screening: No deficits noted. Tuberculosis screening: No symptoms or risk factors           

      identified. Fall Risk None identified.                                                      

                                                                                                  

Assessment:                                                                                       

15:26 General: Appears in no apparent distress. uncomfortable, obese, Behavior is calm,       aj  

      cooperative, appropriate for age. Pain: Denies pain. Neuro: Level of Consciousness is       

      awake, alert, obeys commands, Oriented to person, place, time, situation, Appropriate       

      for age. Cardiovascular: Denies chest pain, Capillary refill < 3 seconds in bilateral       

      fingers Patient's skin is warm and dry. Respiratory: Reports shortness of breath at         

      rest cough that is productive, persistent Airway is patent Respiratory effort is even,      

      unlabored, Respiratory pattern is symmetrical, tachypnea Breath sounds are diminished       

      in left posterior lower lobe and right posterior lower lobe. Derm: Skin is intact, is       

      healthy with good turgor, Skin is pink, warm \T\ dry. normal.                               

16:19 Reassessment: Patient appears in no apparent distress at this time. No changes from     aj  

      previously documented assessment. Patient and/or family updated on plan of care and         

      expected duration. Pain level reassessed. Patient is alert, oriented x 3, equal             

      unlabored respirations, skin warm/dry/pink. Wife is at bedside Patient denies pain at       

      this time. Patient states feeling better. Patient states symptoms have improved.            

16:41 Reassessment: PAtient provided coffee per provider authorization.                       aj  

18:42 Reassessment: Patient appears in no apparent distress at this time. No changes from     aj  

      previously documented assessment. Patient and/or family updated on plan of care and         

      expected duration. Pain level reassessed. Patient is alert, oriented x 3, equal             

      unlabored respirations, skin warm/dry/pink. Patient denies pain at this time. Patient       

      states feeling better. Patient states symptoms have improved.                               

                                                                                                  

Vital Signs:                                                                                      

15:00  / 92; Pulse 132; Resp 24; Temp 98.9; Pulse Ox 97% on 3 lpm NC;                   sg  

15:29  / 72; Pulse 117; Resp 20; Pulse Ox 99% on 2 lpm NC;                              aj  

16:10  / 109; Pulse 133; Resp 23; Pulse Ox 98% on 2 lpm NC;                             aj  

16:23  / 89; Pulse 122; Resp 22; Pulse Ox 95% on 2 lpm NC;                              aj  

17:03  / 107; Pulse 116; Resp 19; Pulse Ox 97% on 2 lpm NC;                             aj  

17:41  / 82; Pulse 109; Resp 20; Pulse Ox 96% on 2 lpm NC;                              aj  

18:33  / 57; Pulse 110; Resp 16; Pulse Ox 97% on 2 lpm NC;                              aj  

                                                                                                  

ED Course:                                                                                        

14:49 Patient arrived in ED.                                                                  rg4 

14:54 [facility]                                                        snw 

14:54 Arun Zarate MD is Attending Physician.                                           snw 

14:54 Lauren Santacruz RN is Primary Nurse.                                                     aj  

15:00 Triage completed.                                                                       sg  

15:00 Arm band placed on.                                                                     sg  

15:00 Inserted saline lock: 20 gauge in right antecubital area, using aseptic technique.      jp3 

      Blood collected.                                                                            

15:00 Initial lab(s) drawn, by me, sent to lab. First set of blood cultures drawn by me.      jp3 

15:15 Second set of blood cultures drawn by me.                                               jp3 

15:21 Placed in gown. Bed in low position. Call light in reach. Side rails up X 1. Side rails jp3 

      up X2. Warm blanket given. Pillow given. Verbal reassurance given. Cardiac monitor on.      

      Pulse ox on. NIBP on.                                                                       

15:23 TSH Sent.                                                                               jp3 

15:23 Lipase Sent.                                                                            jp3 

15:23 Liver (Hepatic) Function Sent.                                                          jp3 

15:23 Creatine Phosphokinase Sent.                                                            jp3 

15:23 CKMB Creatine Kinase MB Sent.                                                           jp3 

15:23 CBC with Automated Diff Sent.                                                           jp3 

15:23 Blood Culture Sent.                                                                     jp3 

15:23 Basic Metabolic Panel Sent.                                                             jp3 

15:23 Basic Metabolic Panel Sent.                                                             jp3 

15:23 Blood Culture Adult (2) Sent.                                                           jp3 

15:23 CBC with Diff Sent.                                                                     jp3 

15:23 Ckmb Sent.                                                                              jp3 

15:23 CPK Sent.                                                                               jp3 

15:23 Lactate Sent.                                                                           jp3 

15:24 LFT's Sent.                                                                             jp3 

15:24 Lipase Sent.                                                                            jp3 

15:24 Procalcitonin Sent.                                                                     jp3 

15:24 Protime (+inr) Sent.                                                                    jp3 

15:24 Ptt, Activated Sent.                                                                    jp3 

15:24 Troponin (emerg Dept Use Only) Sent.                                                    jp3 

15:42 EKG done, by EKG tech. reviewed by Audrey MOTLEY.                               at1 

16:00 Chest Pa And Lat (2 Views) XRAY In Process Unspecified.                                 EDMS

16:12 Notified Nurse Practitioner and/or Physician Assistant of vital signs.                  aj  

18:42 No provider procedures requiring assistance completed. IV discontinued, intact,         aj  

      bleeding controlled, No redness/swelling at site. Pressure dressing applied.                

                                                                                                  

Administered Medications:                                                                         

15:08 Drug: NS 0.9% 500 ml Route: IV; Rate: bolus; Site: right antecubital;                   aj  

15:21 Drug: Insulin Regular Human 5 units {Co-Signature: mg2 (Omar Gonsales RN).} Route:    aj  

      IVP; Site: right antecubital;                                                               

16:17 Follow up: Response: Blood sugar is lowered                                             aj  

15:22 Drug: Insulin Regular Human 5 units {Co-Signature: mg2 (Omar Gonsales RN).} Route:    aj  

      Sub-Q; Site: right upper arm;                                                               

16:17 Follow up: Response: Blood sugar is lowered                                             aj  

15:26 Drug: Xopenex (3) 1.25 mg Route: Inhalation;                                            aj  

16:18 Follow up: Response: Wheezing diminished                                                aj  

15:35 CANCELLED (Physician Discretion): Aspirin Chewable Tablet 324 mg PO once; 81 mg tablets aj  

      x 4                                                                                         

15:38 Drug: Metoprolol 50 mg Route: PO;                                                       aj  

18:32 Follow up: Response: Blood pressure is lowered                                          aj  

16:37 Drug: Rocephin 1 grams Route: IV; Rate: calculated rate; Site: right antecubital;       aj  

18:44 Follow up: Response: No adverse reaction; IV Status: Completed infusion; IV Intake: 10mlaj  

16:41 Drug: Bumex 1 mg Route: IVP; Site: right antecubital;                                   niranjan  

18:33 Follow up: Response: No adverse reaction                                                aj  

                                                                                                  

                                                                                                  

Point of Care Testing:                                                                            

      Blood Glucose:                                                                              

15:15 Blood Glucose: 309 mg/dL;                                                               aj  

16:19 Blood Glucose: 208 mg/dL;                                                               aj  

      Ranges:                                                                                     

                                                                                                  

Intake:                                                                                           

18:44 IV: 10ml; Total: 10ml.                                                                  niranjan  

                                                                                                  

Outcome:                                                                                          

18:31 Discharge ordered by MD.                                                                snliz 

18:42 Discharged to home via wheelchair, with family.                                         niranjan  

18:42 Condition: good                                                                             

18:42 Discharge instructions given to patient, family, Instructed on discharge instructions,      

      follow up and referral plans. medication usage, Demonstrated understanding of               

      instructions, follow-up care, medications, Prescriptions given X 3.                         

18:44 Patient left the ED.                                                                    niranjan  

                                                                                                  

Signatures:                                                                                       

Dispatcher [employer]                           EDNadeem Gale RN RN sg Myers, Amanda, RN                       RN   Audrey Figueroa, FNP-C                 FNP-Csnw                                                  

Lauren Whitney, EKG Tech              EKG Tat1                                                  

Kaye Vicente4                                                  

Oleksandr Barron jp3                                                  

Omar Gonsales RN                           mg2                                                  

                                                                                                  

Corrections: (The following items were deleted from the chart)                                    

15:02 14:59 Acuity: DEWAYNE 3 [facility]  

16:24 15:29  / 72; Pulse 117bpm; Resp 20bpm; Pulse Ox 99% RA; niranjan                          

16:24 16:10  / 109; Pulse 133bpm; Resp 23bpm; Pulse Ox 98% RA; aj                       niranjan  

                                                                                                  

**************************************************************************************************
[facility]

## 2019-07-05 ENCOUNTER — RESULT REVIEW (OUTPATIENT)
Age: 74
End: 2019-07-05

## 2019-07-05 ENCOUNTER — APPOINTMENT (OUTPATIENT)
Dept: THORACIC SURGERY | Facility: HOSPITAL | Age: 74
End: 2019-07-05

## 2019-07-05 PROBLEM — Z98.890 OTHER SPECIFIED POSTPROCEDURAL STATES: Chronic | Status: ACTIVE | Noted: 2019-07-04

## 2019-07-05 PROBLEM — Z96.642 PRESENCE OF LEFT ARTIFICIAL HIP JOINT: Chronic | Status: ACTIVE | Noted: 2019-07-04

## 2019-07-05 PROBLEM — E78.00 PURE HYPERCHOLESTEROLEMIA, UNSPECIFIED: Chronic | Status: ACTIVE | Noted: 2019-07-04

## 2019-07-05 PROBLEM — I10 ESSENTIAL (PRIMARY) HYPERTENSION: Chronic | Status: ACTIVE | Noted: 2019-07-04

## 2019-07-05 PROBLEM — M25.561 PAIN IN RIGHT KNEE: Chronic | Status: ACTIVE | Noted: 2019-07-04

## 2019-07-05 PROBLEM — J45.20 MILD INTERMITTENT ASTHMA, UNCOMPLICATED: Chronic | Status: ACTIVE | Noted: 2019-07-04

## 2019-07-05 LAB
ALBUMIN SERPL ELPH-MCNC: 2.7 G/DL — LOW (ref 3.3–5)
ALP SERPL-CCNC: 127 U/L — HIGH (ref 40–120)
ALT FLD-CCNC: 53 U/L — SIGNIFICANT CHANGE UP (ref 12–78)
ANION GAP SERPL CALC-SCNC: 10 MMOL/L — SIGNIFICANT CHANGE UP (ref 5–17)
AST SERPL-CCNC: 38 U/L — HIGH (ref 15–37)
BILIRUB SERPL-MCNC: 0.4 MG/DL — SIGNIFICANT CHANGE UP (ref 0.2–1.2)
BLD GP AB SCN SERPL QL: SIGNIFICANT CHANGE UP
BUN SERPL-MCNC: 73 MG/DL — HIGH (ref 7–23)
CALCIUM SERPL-MCNC: 8.7 MG/DL — SIGNIFICANT CHANGE UP (ref 8.5–10.1)
CHLORIDE SERPL-SCNC: 111 MMOL/L — HIGH (ref 96–108)
CHLORIDE UR-SCNC: 34 MMOL/L — SIGNIFICANT CHANGE UP
CO2 SERPL-SCNC: 21 MMOL/L — LOW (ref 22–31)
CREAT SERPL-MCNC: 4.39 MG/DL — HIGH (ref 0.5–1.3)
DSDNA AB SER-ACNC: <12 IU/ML — SIGNIFICANT CHANGE UP
GLUCOSE SERPL-MCNC: 96 MG/DL — SIGNIFICANT CHANGE UP (ref 70–99)
HAV IGM SER-ACNC: SIGNIFICANT CHANGE UP
HBV CORE IGM SER-ACNC: SIGNIFICANT CHANGE UP
HBV SURFACE AG SER-ACNC: SIGNIFICANT CHANGE UP
HCV AB S/CO SERPL IA: 0.1 S/CO — SIGNIFICANT CHANGE UP (ref 0–0.99)
HCV AB SERPL-IMP: SIGNIFICANT CHANGE UP
PHOSPHATE SERPL-MCNC: 4.9 MG/DL — HIGH (ref 2.5–4.5)
POTASSIUM SERPL-MCNC: 4.2 MMOL/L — SIGNIFICANT CHANGE UP (ref 3.5–5.3)
POTASSIUM SERPL-SCNC: 4.2 MMOL/L — SIGNIFICANT CHANGE UP (ref 3.5–5.3)
PROT SERPL-MCNC: 6.8 GM/DL — SIGNIFICANT CHANGE UP (ref 6–8.3)
RHEUMATOID FACT SERPL-ACNC: 14 IU/ML — HIGH (ref 0–13)
SODIUM SERPL-SCNC: 142 MMOL/L — SIGNIFICANT CHANGE UP (ref 135–145)
SODIUM UR-SCNC: 45 MMOL/L — SIGNIFICANT CHANGE UP
TYPE + AB SCN PNL BLD: SIGNIFICANT CHANGE UP

## 2019-07-05 PROCEDURE — 33025 INCISION OF HEART SAC: CPT

## 2019-07-05 PROCEDURE — 33025 INCISION OF HEART SAC: CPT | Mod: AS

## 2019-07-05 PROCEDURE — 88104 CYTOPATH FL NONGYN SMEARS: CPT | Mod: 26

## 2019-07-05 PROCEDURE — 33208 INSRT HEART PM ATRIAL & VENT: CPT | Mod: KX

## 2019-07-05 PROCEDURE — 93010 ELECTROCARDIOGRAM REPORT: CPT | Mod: 76

## 2019-07-05 PROCEDURE — 99222 1ST HOSP IP/OBS MODERATE 55: CPT

## 2019-07-05 PROCEDURE — 88305 TISSUE EXAM BY PATHOLOGIST: CPT | Mod: 26

## 2019-07-05 PROCEDURE — 71045 X-RAY EXAM CHEST 1 VIEW: CPT | Mod: 26,76

## 2019-07-05 RX ORDER — ONDANSETRON 8 MG/1
4 TABLET, FILM COATED ORAL ONCE
Refills: 0 | Status: DISCONTINUED | OUTPATIENT
Start: 2019-07-05 | End: 2019-07-05

## 2019-07-05 RX ORDER — OXYCODONE AND ACETAMINOPHEN 5; 325 MG/1; MG/1
2 TABLET ORAL EVERY 6 HOURS
Refills: 0 | Status: DISCONTINUED | OUTPATIENT
Start: 2019-07-05 | End: 2019-07-09

## 2019-07-05 RX ORDER — CEFAZOLIN SODIUM 1 G
1000 VIAL (EA) INJECTION EVERY 8 HOURS
Refills: 0 | Status: DISCONTINUED | OUTPATIENT
Start: 2019-07-05 | End: 2019-07-05

## 2019-07-05 RX ORDER — ACETAMINOPHEN 500 MG
1000 TABLET ORAL ONCE
Refills: 0 | Status: COMPLETED | OUTPATIENT
Start: 2019-07-05 | End: 2019-07-05

## 2019-07-05 RX ORDER — ATORVASTATIN CALCIUM 80 MG/1
40 TABLET, FILM COATED ORAL AT BEDTIME
Refills: 0 | Status: DISCONTINUED | OUTPATIENT
Start: 2019-07-05 | End: 2019-07-09

## 2019-07-05 RX ORDER — CEFAZOLIN SODIUM 1 G
1000 VIAL (EA) INJECTION EVERY 8 HOURS
Refills: 0 | Status: COMPLETED | OUTPATIENT
Start: 2019-07-05 | End: 2019-07-06

## 2019-07-05 RX ORDER — OXYCODONE HYDROCHLORIDE 5 MG/1
5 TABLET ORAL ONCE
Refills: 0 | Status: DISCONTINUED | OUTPATIENT
Start: 2019-07-05 | End: 2019-07-05

## 2019-07-05 RX ORDER — DOCUSATE SODIUM 100 MG
100 CAPSULE ORAL THREE TIMES A DAY
Refills: 0 | Status: DISCONTINUED | OUTPATIENT
Start: 2019-07-05 | End: 2019-07-09

## 2019-07-05 RX ORDER — SENNA PLUS 8.6 MG/1
2 TABLET ORAL AT BEDTIME
Refills: 0 | Status: DISCONTINUED | OUTPATIENT
Start: 2019-07-05 | End: 2019-07-09

## 2019-07-05 RX ORDER — ALBUTEROL 90 UG/1
2 AEROSOL, METERED ORAL EVERY 6 HOURS
Refills: 0 | Status: DISCONTINUED | OUTPATIENT
Start: 2019-07-05 | End: 2019-07-09

## 2019-07-05 RX ORDER — SODIUM CHLORIDE 9 MG/ML
1000 INJECTION, SOLUTION INTRAVENOUS
Refills: 0 | Status: DISCONTINUED | OUTPATIENT
Start: 2019-07-05 | End: 2019-07-05

## 2019-07-05 RX ORDER — OXYCODONE AND ACETAMINOPHEN 5; 325 MG/1; MG/1
1 TABLET ORAL EVERY 4 HOURS
Refills: 0 | Status: DISCONTINUED | OUTPATIENT
Start: 2019-07-05 | End: 2019-07-09

## 2019-07-05 RX ORDER — FENTANYL CITRATE 50 UG/ML
50 INJECTION INTRAVENOUS
Refills: 0 | Status: DISCONTINUED | OUTPATIENT
Start: 2019-07-05 | End: 2019-07-09

## 2019-07-05 RX ORDER — FENTANYL CITRATE 50 UG/ML
25 INJECTION INTRAVENOUS
Refills: 0 | Status: DISCONTINUED | OUTPATIENT
Start: 2019-07-05 | End: 2019-07-05

## 2019-07-05 RX ADMIN — FENTANYL CITRATE 50 MICROGRAM(S): 50 INJECTION INTRAVENOUS at 18:06

## 2019-07-05 RX ADMIN — ATORVASTATIN CALCIUM 40 MILLIGRAM(S): 80 TABLET, FILM COATED ORAL at 22:36

## 2019-07-05 RX ADMIN — FENTANYL CITRATE 25 MICROGRAM(S): 50 INJECTION INTRAVENOUS at 18:00

## 2019-07-05 RX ADMIN — OXYCODONE HYDROCHLORIDE 5 MILLIGRAM(S): 5 TABLET ORAL at 17:46

## 2019-07-05 RX ADMIN — FENTANYL CITRATE 25 MICROGRAM(S): 50 INJECTION INTRAVENOUS at 17:40

## 2019-07-05 RX ADMIN — Medication 100 MILLIGRAM(S): at 10:13

## 2019-07-05 RX ADMIN — OXYCODONE AND ACETAMINOPHEN 2 TABLET(S): 5; 325 TABLET ORAL at 23:21

## 2019-07-05 RX ADMIN — Medication 1000 MILLIGRAM(S): at 23:06

## 2019-07-05 RX ADMIN — OXYCODONE HYDROCHLORIDE 5 MILLIGRAM(S): 5 TABLET ORAL at 18:57

## 2019-07-05 RX ADMIN — FENTANYL CITRATE 25 MICROGRAM(S): 50 INJECTION INTRAVENOUS at 17:50

## 2019-07-05 RX ADMIN — Medication 400 MILLIGRAM(S): at 18:57

## 2019-07-05 RX ADMIN — SODIUM CHLORIDE 75 MILLILITER(S): 9 INJECTION, SOLUTION INTRAVENOUS at 17:54

## 2019-07-05 RX ADMIN — FENTANYL CITRATE 25 MICROGRAM(S): 50 INJECTION INTRAVENOUS at 17:30

## 2019-07-05 NOTE — BRIEF OPERATIVE NOTE - OPERATION/FINDINGS
270cc serous fluid evacuated, sent for cytology and culture.  Pericardial biopsy sent 270cc serous fluid evacuated, sent for cytology and culture.  Pericardial biopsy sent  Normal appearing pericardium

## 2019-07-05 NOTE — PROGRESS NOTE ADULT - SUBJECTIVE AND OBJECTIVE BOX
CC:  Patient is a 73y old  Female who presents with a chief complaint of Heart Block, Bradycardia (04 Jul 2019 09:58)    SUBJECTIVE:     -no new complaints or issues at current time.    ROS:  all other review of systems are negative unless indicated above.    acetaminophen   Tablet .. 650 milliGRAM(s) Oral every 6 hours PRN  ALBUTerol    90 MICROgram(s) HFA Inhaler 2 Puff(s) Inhalation every 6 hours PRN  atorvastatin 40 milliGRAM(s) Oral at bedtime  sodium chloride 0.9%. 1000 milliLiter(s) IV Continuous <Continuous>    T(C): 36.6 (07-05-19 @ 09:45), Max: 36.6 (07-05-19 @ 09:45)  HR: 74 (07-05-19 @ 09:45) (38 - 85)  BP: 159/68 (07-05-19 @ 09:45) (94/52 - 159/68)  RR: 20 (07-05-19 @ 09:45) (13 - 27)  SpO2: 96% (07-05-19 @ 09:45) (80% - 100%)    Constitutional: NAD.   Neck:  JVP wnl.  supple.  Respiratory: Breath sounds are clear bilaterally, No wheezing, rales or rhonchi  Cardiovascular: S1 and S2, regular rate and rhythm, no murmur, rub or gallop.  Gastrointestinal: Bowel Sounds present, soft, nontender, nondistended, no guarding, no rebound, no mass.  Extremities: No peripheral edema  Neurological: A/O x                            11.5   8.19  )-----------( 199      ( 04 Jul 2019 08:30 )             35.8     PT/INR - ( 04 Jul 2019 08:30 )   PT: 12.4 sec;   INR: 1.11 ratio         PTT - ( 04 Jul 2019 08:30 )  PTT:34.0 sec  07-05    142  |  111<H>  |  73<H>  ----------------------------<  96  4.2   |  21<L>  |  4.39<H>    Ca    8.7      05 Jul 2019 06:16  Phos  4.9     07-05  Mg     2.4     07-04    TPro  6.8  /  Alb  2.7<L>  /  TBili  0.4  /  DBili  x   /  AST  38<H>  /  ALT  53  /  AlkPhos  127<H>  07-05

## 2019-07-05 NOTE — PACU DISCHARGE NOTE - COMMENTS
Report given to Rosette CCU RN. Denies pain or discomfort at this time. Home monitor device given to patient.

## 2019-07-05 NOTE — PROGRESS NOTE ADULT - ASSESSMENT
Impression:  73F.  admitted 07/04/2019.  presented to ED c/o dyspnea.  progressive in nature.  onset 2-3 days prior to admission.  incidently reports hx of recent insect bite ~ 2 weeks ago.  in ED, found to be in 3rd degree HB.  prophylactic does of doxycycline given.    PMHx:  moderate b/l carotid artery disease;  HTN;  HLD;  mild intermittent asthma    Assessment:  1.	3rd degree HB.  2.	moderate to large pericardial effusion.  3.	ABIOLA.  4.	hx HTN.  5.	hx asthma.    Plan:  -TTE LVEF 65-70%.  mod AI.  mod pHTN.  mod-large pericardial effusion-no tamponade.  -PPM placement this AM.  -pericardiocentesis, +/- pericardial window.    -TSH, rheumatologic and infectious serologies.  -renal US, no obstruction.  avoid NSAIDs and ARB.  IVFs (keep net positive to improve MAP).  -c/w chronic medical issue management.  -DVT prophylaxis.  -discussed w/ RN

## 2019-07-05 NOTE — PROGRESS NOTE ADULT - ASSESSMENT
73 year old woman with the above history admitted with symptomatic heart block but also with new onset acute renal insufficiency.  This may be related to ATN perhaps from hypotension related to her heart block and some degree of intravascular depletion along with her ARB therapy.  Will give IV hydration and hold hyzaar.  If creatinine improves by tomorrow would then proceed with PPM implantation for her heart block.  Check TTE    7/5/19:  Patient with moderate to large pericardial effusion.  Is to go for drainage today by Dr White.  Creatinine improving.  Mitral valve disease has worsened since her echo 18 months ago(which reveal a small to moderate pericardial effusion as well).  Continue IV hydration.  Blood work sent off for autoimmune work up.  Will revue TTE personally. 73 year old woman with the above history admitted with symptomatic heart block but also with new onset acute renal insufficiency.  This may be related to ATN perhaps from hypotension related to her heart block and some degree of intravascular depletion along with her ARB therapy.  Will give IV hydration and hold hyzaar.  If creatinine improves by tomorrow would then proceed with PPM implantation for her heart block.  Check TTE    7/5/19:  Patient with moderate to large pericardial effusion.  Is to go for drainage today by Dr White.  Creatinine improving.  Mitral valve disease has worsened since her echo 18 months ago(which reveal a small to moderate pericardial effusion as well).  Continue IV hydration.  Blood work sent off for autoimmune work up.  Will revue TTE personally.  Also for PPM insertion.

## 2019-07-05 NOTE — PROGRESS NOTE ADULT - SUBJECTIVE AND OBJECTIVE BOX
· Chief Complaint: The patient is a 73y Female complaining of shortness of breath.	  · HPI Objective Statement: 73F hx htn hld asthma presents to the ED for shortness of breath and weakness. Pt states that shortness of breath is present with exertion. states that when she walks to the mailbox she has to stop to catch her breath. mild cough. no sob when laying flat. no cp. no had or dizziness. no diaphoresis. states that she is having work done in her kitchen and there is dust. also states that 1 month ago she was bit by a tick but received doxycycline 1 dose.	    PAST MEDICAL/SURGICAL/FAMILY/SOCIAL HISTORY:     Hypertension  Asthma  Dyslipidemia  Moderate bilateral carotid disease   Alcohol Use History:  · Have you ever consumed alcohol	never	     Tobacco Usage:  · Tobacco Usage	Never smoker	    ALLERGIES AND HOME MEDICATIONS:   Allergies:        Allergies:  	No Known Allergies:     Home Medications:   * Outpatient Medication Status not yet specified    REVIEW OF SYSTEMS:    Review of Systems:  · Review of Systems: Constitutional: No fever or chills  Eyes: No visual changes  HEENT: No throat pain  CV: No chest pain  Resp: + SOB + cough  GI: No abd pain, nausea or vomiting  : No dysuria  MSK: No musculoskeletal pain  Skin: No rash Neuro: No headache	    PHYSICAL EXAM:   · Physical Examination: Constitutional: NAD AAOx3  Eyes: PERRLA EOMI  Head: Normocephalic atraumatic  Mouth: MMM  Cardiac: bradycardic no LE swelling normal peripheral pulses  Resp: Lungs CTAB  GI: Abd s/nt/nd  Neuro: CN2-12 intact Skin: No rashes	    CURRENT ORDERS/:   · 	Activity - Bedrest, 04-Jul-2019, Active, Standard  · 	Cardiac Monitor Bedside,   Time/Priority:  STAT, 04-Jul-2019, Active, Standard  · 	IV Insert,   Time/Priority:  STAT, 04-Jul-2019, Active, Standard  · 	Pulse Oximetry,   Frequency: <Continuous>  	  Additional Instructions:  Notify Provider if oxygen saturation is LESS THAN 92%, 04-Jul-2019, Active, Standard  · 	Vital Signs,   Frequency:  per routine, 04-Jul-2019, Active, Standard    RESULTS:   · EKG Date/Time: 04-Jul-2019 08:25	  · Rate: 42	  · Interpretation: abnormal	  · Abnormal Rhythm: bradycardia	  · Acute or Evolving MI?: no	  · Axis: Right Deviation	  · CA: 302	  · QRS: 88	  · ST/T Wave: no st elevations	  · Other Findings: 1st degree av block	  · EKG Date/Time: 04-Jul-2019 08:58	  · Rate: 39	  · Interpretation: abnormal	  · Abnormal Rhythm: 3rd degree heart block	  · Acute or Evolving MI?: no	  · Axis: Normal	  · CA: 98	  · QRS: 88	  · ST/T Wave: no st elevations	    PROGRESS NOTE:   Date: 04-Jul-2019 08:59.     Progress: repeat EKG - pt in 3rd degree heart block - pads placed on patient. bp stable. cardiology paged. Arya López M.D., Attending Physician.     Date: 04-Jul-2019 09:07.     Progress: spoke with Dr. Wilhelm will see patient shortly. Arya López M.D., Attending Physician.     Date: 04-Jul-2019 09:14.     Progress: pt endorsed to DR. Pike for 3rd degree block pt to go to CCU. Arya López M.D., Attending Physician.     Date: 04-Jul-2019 09:20.     Progress: spoke with EP will see patient shortly. Arya López M.D., Attending Physician.    DISPOSITION:    Care Plan - Instructions:  Principal Discharge DX:	Exertional dyspnea  Secondary Diagnosis:	Heart block AV third degree.     Impression:  Principal Discharge Dx Exertional dyspnea.     Secondary Discharge Dx Heart block AV third degree.     Medical Decision Making:  · Physician E/M Selection	[id number] Critical Care - 30 to 74 minutes	  · The following orders were submitted:	Labs, EKG, Imaging Studies, Medications	  · EKG - Performed independent visualization (See EKG Result section above)	Yes	  · Critical Care Indication	patient was critically ill...	  · Critical Care Indication	Patient was critically ill with a high probability of imminent or life threatening deterioration.	  · Critical Care Provided	direct patient care (not related to procedure), additional history taking, interpretation of diagnostic studies, documentation, consultation with other physicians	  · Critical Care Attestation	I have personally provided the amount of critical care time documented below excluding time spent on separate procedures	  · Crtical Care Time Spent (min) Must be 30 or more minutes to qualify:	30	  · Clinical Summary  (MDM): Summarize the clinical encounter	73F hx htn hld asthma presents to the ED for shortness of breath and weakness. Pt states that shortness of breath is present with exertion. states that when she walks to the mailbox she has to stop to catch her breath. mild cough. no sob when laying flat. no cp. no had or dizziness. no diaphoresis. states that she is having work done in her kitchen and there is dust. also states that 1 month ago she was bit by a tick but received doxycycline 1 dose. exam with bradycardic no LE swelling normal peripheral pulses. son at bedside is a physician at . spoke with patient and son regarding care - given new bradycardia concern for cardiac etiology. 1st degree block - will send lyme and trop, touch base with cardiologist. lower suspicion asthma as normal breath sounds no wheezing not tight. will obtain xray cardiac monitor and reassess. Arya López M.D., Attending Physician	        Accepting Physician:   Provider Role	Provider Name	  · Accepting Physician	Froilan Pike	    Prescriptions:   * Outpatient Medication Status not yet specified    ATTESTATION STATEMENT:    Attestations Statements:  Attending Statement: Attending Only.    PROVIDER CARE INITIATION:   · Care Initiated by:	Arya López(Attending)	  · Provider Care Initiated at:	04-Jul-2019 08:01	      Electronic Signatures:  Arya López)  (Signed 04-Jul-2019 09:21)  	Authored: HISTORY OF PRESENT ILLNESS, PAST MEDICAL/SURGICAL/FAMILY/SOCIAL HISTORY, ALLERGIES AND HOME MEDICATIONS, REVIEW OF SYSTEMS, PHYSICAL EXAM, CURRENT ORDERS/, RESULTS, PROGRESS NOTE, DISPOSITION, ATTESTATION STATEMENT, PROVIDER CARE INITIATION      Last Updated: 04-Jul-2019 09:21 by Arya López)    References:  1.  Data Referenced From "ED ADULT Triage Note" 7/4/2019 7:55 AM                REVIEW OF SYSTEMS:    As above  No chest pain + shortness of breath  No lightheadeness or syncope  No leg swelling  No palpitations  No claudication-like symptoms    Vital Signs Last 24 Hrs  T(C): 36.5 (04 Jul 2019 08:00), Max: 36.5 (04 Jul 2019 08:00)  T(F): 97.7 (04 Jul 2019 08:00), Max: 97.7 (04 Jul 2019 08:00)  HR: 41 (04 Jul 2019 08:00) (41 - 41)  BP: 159/59 (04 Jul 2019 08:00) (159/59 - 159/59)  BP(mean): --  RR: 15 (04 Jul 2019 08:00) (15 - 15)  SpO2: 97% (04 Jul 2019 08:00) (97% - 97%)    I&O's Summary      CAPILLARY BLOOD GLUCOSE          PHYSICAL EXAM:   Patient in NAD  Neck: No JVD; Carotids:  2+ without bruits  Respiratory:  Clear to A&P  Cardiovascular: S1 and S2, regular rate/bradykardic and rhythm; + MICHELLE  Gastrointestinal:  Soft, non-tender; BS positive  Extremities: No peripheral edema  Vascular: 2+ peripheral pulses  Neurological: A/O x 3, no focal deficits      MEDICATIONS:  MEDICATIONS  (STANDING):  atorvastatin 40 milliGRAM(s) Oral at bedtime  sodium chloride 0.9%. 1000 milliLiter(s) (100 mL/Hr) IV Continuous <Continuous>      LABS: All Labs Reviewed:  Blood Culture:   BNP Serum Pro-Brain Natriuretic Peptide: 8937 pg/mL (07-04 @ 08:30)    CBC             WBC Count: 8.19 K/uL (07-04 @ 08:30)              Hemoglobin: 11.5 g/dL (07-04 @ 08:30)              Hematocrit: 35.8 % (07-04 @ 08:30)              Mean Cell Volume: 90.4 fl (07-04 @ 08:30)              Platelet Count - Automated: 199 K/uL (07-04 @ 08:30)                            Cardiac markers             Troponin I, Serum: <0.015 ng/mL (07-04 @ 08:30)                             Chems        Sodium, Serum: 140 mmol/L (07-04 @ 08:30)          Potassium, Serum: 4.4 mmol/L (07-04 @ 08:30)          Blood Urea Nitrogen, Serum: 81 mg/dL (07-04 @ 08:30)          Creatinine 5.51          Magnesium, Serum: 2.4 mg/dL (07-04 @ 08:30)          Protein Total, Serum: 7.5 gm/dL (07-04 @ 08:30)                  Calcium, Total Serum: 9.1 mg/dL (07-04 @ 08:30)                  Bilirubin Total, Serum: 0.5 mg/dL (07-04 @ 08:30)          Alanine Aminotransferase (ALT/SGPT): 56 U/L (07-04 @ 08:30)          Aspartate Aminotransferase (AST/SGOT): 47 U/L (07-04 @ 08:30)    07-05    142  |  111<H>  |  73<H>  ----------------------------<  96  4.2   |  21<L>  |  4.39<H>    Ca    8.7      05 Jul 2019 06:16  Mg     2.4     07-04    TPro  6.8  /  Alb  2.7<L>  /  TBili  0.4  /  DBili  x   /  AST  38<H>  /  ALT  53  /  AlkPhos  127<H>  07-05                   INR: 1.11 ratio (07-04 @ 08:30)             RADIOLOGY:    EKG:  Sinus rhythm; 2:1 AV block; old ASWMI(unchanged)    Telemetry:    ECHO:  < from: Transthoracic Echocardiogram (07.04.19 @ 11:38) >   The left ventricle is normal in size. Estimated left ventricular ejection   fraction is 65-70 %.   The left atrium is moderately dilated.   Significant fibrocalcific changes noted to the aortic valve leaflets with   restriction in leaflet excursion. Peak transaortic gradient is 26mmHg;   this finding is consistent with mild aortic stenosis.   Moderate (2+) aortic regurgitation is present.   Fibrocalcific changes noted to the mitral valve leaflets with restriction   in mitral leaflet excursion. Mean transmitral gradient is 15 mmHg; this   is   finding is consistent with severe mitral stenosis. Mild mitral   regurgitation noted. Impaired relaxation noted.   Moderate posterior mitral annular calcification noted.   The tricuspid valve leaflets are well seen and appear thin and pliable   with preserved leaflets excursion. Mild tricuspid regurgitation noted.   Moderate pulmonary hypertension is suggested.   Severe pulmonary hypertension.   The pulmonicvalve is not well seen. Trace to mild pulmonic regurgitation   noted.   Moderate to large circumferential pericardial effusion noted without echo   evidence for tamponade.    < end of copied text > · Chief Complaint: The patient is a 73y Female complaining of shortness of breath.	  · HPI Objective Statement: 73F hx htn hld asthma presents to the ED for shortness of breath and weakness. Pt states that shortness of breath is present with exertion. states that when she walks to the mailbox she has to stop to catch her breath. mild cough. no sob when laying flat. no cp. no had or dizziness. no diaphoresis. states that she is having work done in her kitchen and there is dust. also states that 1 month ago she was bit by a tick but received doxycycline 1 dose.	    PAST MEDICAL/SURGICAL/FAMILY/SOCIAL HISTORY:     Hypertension  Asthma  Dyslipidemia  Moderate bilateral carotid disease   Alcohol Use History:  · Have you ever consumed alcohol	never	     Tobacco Usage:  · Tobacco Usage	Never smoker	    ALLERGIES AND HOME MEDICATIONS:   Allergies:        Allergies:  	No Known Allergies:     Home Medications:   * Outpatient Medication Status not yet specified    REVIEW OF SYSTEMS:    Review of Systems:  · Review of Systems: Constitutional: No fever or chills  Eyes: No visual changes  HEENT: No throat pain  CV: No chest pain  Resp: + SOB + cough  GI: No abd pain, nausea or vomiting  : No dysuria  MSK: No musculoskeletal pain  Skin: No rash Neuro: No headache	    PHYSICAL EXAM:   · Physical Examination: Constitutional: NAD AAOx3  Eyes: PERRLA EOMI  Head: Normocephalic atraumatic  Mouth: MMM  Cardiac: bradycardic no LE swelling normal peripheral pulses  Resp: Lungs CTAB  GI: Abd s/nt/nd  Neuro: CN2-12 intact Skin: No rashes	    CURRENT ORDERS/:   · 	Activity - Bedrest, 04-Jul-2019, Active, Standard  · 	Cardiac Monitor Bedside,   Time/Priority:  STAT, 04-Jul-2019, Active, Standard  · 	IV Insert,   Time/Priority:  STAT, 04-Jul-2019, Active, Standard  · 	Pulse Oximetry,   Frequency: <Continuous>  	  Additional Instructions:  Notify Provider if oxygen saturation is LESS THAN 92%, 04-Jul-2019, Active, Standard  · 	Vital Signs,   Frequency:  per routine, 04-Jul-2019, Active, Standard    RESULTS:   · EKG Date/Time: 04-Jul-2019 08:25	  · Rate: 42	  · Interpretation: abnormal	  · Abnormal Rhythm: bradycardia	  · Acute or Evolving MI?: no	  · Axis: Right Deviation	  · NV: 302	  · QRS: 88	  · ST/T Wave: no st elevations	  · Other Findings: 1st degree av block	  · EKG Date/Time: 04-Jul-2019 08:58	  · Rate: 39	  · Interpretation: abnormal	  · Abnormal Rhythm: 3rd degree heart block	  · Acute or Evolving MI?: no	  · Axis: Normal	  · NV: 98	  · QRS: 88	  · ST/T Wave: no st elevations	    PROGRESS NOTE:   Date: 04-Jul-2019 08:59.     Progress: repeat EKG - pt in 3rd degree heart block - pads placed on patient. bp stable. cardiology paged. Arya López M.D., Attending Physician.     Date: 04-Jul-2019 09:07.     Progress: spoke with Dr. Wilhelm will see patient shortly. Arya López M.D., Attending Physician.     Date: 04-Jul-2019 09:14.     Progress: pt endorsed to DR. Pike for 3rd degree block pt to go to CCU. Arya López M.D., Attending Physician.     Date: 04-Jul-2019 09:20.     Progress: spoke with EP will see patient shortly. Arya López M.D., Attending Physician.    DISPOSITION:    Care Plan - Instructions:  Principal Discharge DX:	Exertional dyspnea  Secondary Diagnosis:	Heart block AV third degree.     Impression:  Principal Discharge Dx Exertional dyspnea.     Secondary Discharge Dx Heart block AV third degree.     Medical Decision Making:  · Physician E/M Selection	[id number] Critical Care - 30 to 74 minutes	  · The following orders were submitted:	Labs, EKG, Imaging Studies, Medications	  · EKG - Performed independent visualization (See EKG Result section above)	Yes	  · Critical Care Indication	patient was critically ill...	  · Critical Care Indication	Patient was critically ill with a high probability of imminent or life threatening deterioration.	  · Critical Care Provided	direct patient care (not related to procedure), additional history taking, interpretation of diagnostic studies, documentation, consultation with other physicians	  · Critical Care Attestation	I have personally provided the amount of critical care time documented below excluding time spent on separate procedures	  · Crtical Care Time Spent (min) Must be 30 or more minutes to qualify:	30	  · Clinical Summary  (MDM): Summarize the clinical encounter	73F hx htn hld asthma presents to the ED for shortness of breath and weakness. Pt states that shortness of breath is present with exertion. states that when she walks to the mailbox she has to stop to catch her breath. mild cough. no sob when laying flat. no cp. no had or dizziness. no diaphoresis. states that she is having work done in her kitchen and there is dust. also states that 1 month ago she was bit by a tick but received doxycycline 1 dose. exam with bradycardic no LE swelling normal peripheral pulses. son at bedside is a physician at . spoke with patient and son regarding care - given new bradycardia concern for cardiac etiology. 1st degree block - will send lyme and trop, touch base with cardiologist. lower suspicion asthma as normal breath sounds no wheezing not tight. will obtain xray cardiac monitor and reassess. Arya López M.D., Attending Physician	        Accepting Physician:   Provider Role	Provider Name	  · Accepting Physician	Froilan Pike	    Prescriptions:   * Outpatient Medication Status not yet specified    ATTESTATION STATEMENT:    Attestations Statements:  Attending Statement: Attending Only.    PROVIDER CARE INITIATION:   · Care Initiated by:	Arya López(Attending)	  · Provider Care Initiated at:	04-Jul-2019 08:01	      Electronic Signatures:  Arya López)  (Signed 04-Jul-2019 09:21)  	Authored: HISTORY OF PRESENT ILLNESS, PAST MEDICAL/SURGICAL/FAMILY/SOCIAL HISTORY, ALLERGIES AND HOME MEDICATIONS, REVIEW OF SYSTEMS, PHYSICAL EXAM, CURRENT ORDERS/, RESULTS, PROGRESS NOTE, DISPOSITION, ATTESTATION STATEMENT, PROVIDER CARE INITIATION      Last Updated: 04-Jul-2019 09:21 by Arya López)    References:  1.  Data Referenced From "ED ADULT Triage Note" 7/4/2019 7:55 AM                REVIEW OF SYSTEMS:    As above  No chest pain + shortness of breath  No lightheadeness or syncope  No leg swelling  No palpitations  No claudication-like symptoms    Vital Signs Last 24 Hrs  T(C): 36.5 (04 Jul 2019 08:00), Max: 36.5 (04 Jul 2019 08:00)  T(F): 97.7 (04 Jul 2019 08:00), Max: 97.7 (04 Jul 2019 08:00)  HR: 41 (04 Jul 2019 08:00) (41 - 41)  BP: 159/59 (04 Jul 2019 08:00) (159/59 - 159/59)  BP(mean): --  RR: 15 (04 Jul 2019 08:00) (15 - 15)  SpO2: 97% (04 Jul 2019 08:00) (97% - 97%)    I&O's Summary      CAPILLARY BLOOD GLUCOSE          PHYSICAL EXAM:   Patient in NAD  Neck: No JVD; Carotids:  2+ without bruits  Respiratory:  Clear to A&P  Cardiovascular: S1 and S2, regular rate/bradykardic and rhythm; + MICHELLE  Gastrointestinal:  Soft, non-tender; BS positive  Extremities: No peripheral edema  Vascular: 2+ peripheral pulses  Neurological: A/O x 3, no focal deficits      MEDICATIONS:  MEDICATIONS  (STANDING):  atorvastatin 40 milliGRAM(s) Oral at bedtime  sodium chloride 0.9%. 1000 milliLiter(s) (100 mL/Hr) IV Continuous <Continuous>      LABS: All Labs Reviewed:  Blood Culture:   BNP Serum Pro-Brain Natriuretic Peptide: 8937 pg/mL (07-04 @ 08:30)    CBC             WBC Count: 8.19 K/uL (07-04 @ 08:30)              Hemoglobin: 11.5 g/dL (07-04 @ 08:30)              Hematocrit: 35.8 % (07-04 @ 08:30)              Mean Cell Volume: 90.4 fl (07-04 @ 08:30)              Platelet Count - Automated: 199 K/uL (07-04 @ 08:30)                            Cardiac markers             Troponin I, Serum: <0.015 ng/mL (07-04 @ 08:30)                             Chems        Sodium, Serum: 140 mmol/L (07-04 @ 08:30)          Potassium, Serum: 4.4 mmol/L (07-04 @ 08:30)          Blood Urea Nitrogen, Serum: 81 mg/dL (07-04 @ 08:30)          Creatinine 5.51          Magnesium, Serum: 2.4 mg/dL (07-04 @ 08:30)          Protein Total, Serum: 7.5 gm/dL (07-04 @ 08:30)                  Calcium, Total Serum: 9.1 mg/dL (07-04 @ 08:30)                  Bilirubin Total, Serum: 0.5 mg/dL (07-04 @ 08:30)          Alanine Aminotransferase (ALT/SGPT): 56 U/L (07-04 @ 08:30)          Aspartate Aminotransferase (AST/SGOT): 47 U/L (07-04 @ 08:30)    07-05    142  |  111<H>  |  73<H>  ----------------------------<  96  4.2   |  21<L>  |  4.39<H>    Ca    8.7      05 Jul 2019 06:16  Mg     2.4     07-04    TPro  6.8  /  Alb  2.7<L>  /  TBili  0.4  /  DBili  x   /  AST  38<H>  /  ALT  53  /  AlkPhos  127<H>  07-05                   INR: 1.11 ratio (07-04 @ 08:30)             RADIOLOGY:    EKG:  Sinus rhythm; 2:1 AV block; old ASWMI(unchanged)    Telemetry:   second degree heart block    ECHO:  < from: Transthoracic Echocardiogram (07.04.19 @ 11:38) >   The left ventricle is normal in size. Estimated left ventricular ejection   fraction is 65-70 %.   The left atrium is moderately dilated.   Significant fibrocalcific changes noted to the aortic valve leaflets with   restriction in leaflet excursion. Peak transaortic gradient is 26mmHg;   this finding is consistent with mild aortic stenosis.   Moderate (2+) aortic regurgitation is present.   Fibrocalcific changes noted to the mitral valve leaflets with restriction   in mitral leaflet excursion. Mean transmitral gradient is 15 mmHg; this   is   finding is consistent with severe mitral stenosis. Mild mitral   regurgitation noted. Impaired relaxation noted.   Moderate posterior mitral annular calcification noted.   The tricuspid valve leaflets are well seen and appear thin and pliable   with preserved leaflets excursion. Mild tricuspid regurgitation noted.   Moderate pulmonary hypertension is suggested.   Severe pulmonary hypertension.   The pulmonicvalve is not well seen. Trace to mild pulmonic regurgitation   noted.   Moderate to large circumferential pericardial effusion noted without echo   evidence for tamponade.    < end of copied text >

## 2019-07-05 NOTE — CONSULT NOTE ADULT - SUBJECTIVE AND OBJECTIVE BOX
History of Present Illness:  73y Female with progressive dyspnea over the past week, brought to  by her son.  Found to be in heart block with a large pericardial effusion without tamponade.  Denies fever, chills, cough.      PMH/PSH:  Right knee pain, unspecified chronicity  High cholesterol  Mild intermittent asthma, unspecified whether complicated  History of lumpectomy: left and right breast  History of total left hip replacement  Hypertension, unspecified type      SOCIAL HISTORY:  Smoker: [ ] Yes  [x] No        PACK YEARS:                         WHEN QUIT?  ETOH use: [ ] Yes  [x] No              FREQUENCY / QUANTITY:  Ilicit Drug use:  [ ] Yes  [x] No  Occupation: retired  Live with: alone  Assist device use: none    MEDICATIONS  (STANDING):  atorvastatin 40 milliGRAM(s) Oral at bedtime  ceFAZolin   IVPB 2000 milliGRAM(s) IV Intermittent once  sodium chloride 0.9%. 1000 milliLiter(s) (100 mL/Hr) IV Continuous <Continuous>    MEDICATIONS  (PRN):  acetaminophen   Tablet .. 650 milliGRAM(s) Oral every 6 hours PRN Mild Pain (1 - 3), Moderate Pain (4 - 6)  ALBUTerol    90 MICROgram(s) HFA Inhaler 2 Puff(s) Inhalation every 6 hours PRN Bronchospasm      Allergies: No Known Allergies                                                            LABS:                        11.5   8.19  )-----------( 199      ( 2019 08:30 )             35.8     07-05    142  |  111<H>  |  73<H>  ----------------------------<  96  4.2   |  21<L>  |  4.39<H>    Ca    8.7      2019 06:16  Phos  4.9     07-05  Mg     2.4     07-04    TPro  6.8  /  Alb  2.7<L>  /  TBili  0.4  /  DBili  x   /  AST  38<H>  /  ALT  53  /  AlkPhos  127<H>  07-05    PT/INR - ( 2019 08:30 )   PT: 12.4 sec;   INR: 1.11 ratio         PTT - ( 2019 08:30 )  PTT:34.0 sec  Urinalysis Basic - ( 2019 16:00 )    Color: Yellow / Appearance: Clear / S.010 / pH: x  Gluc: x / Ketone: Negative  / Bili: Negative / Urobili: Negative mg/dL   Blood: x / Protein: 30 mg/dL / Nitrite: Negative   Leuk Esterase: Trace / RBC: 6-10 /HPF / WBC 6-10   Sq Epi: x / Non Sq Epi: Few / Bacteria: Occasional      Review of Systems         Constitutional: denies fever, chills, general malaise, weight loss, weight gain, diaphoresis   HEENT: denies dry mouth, sore throat, runny nose, photophobia, blurry vision, double vision, glasses, discharge, eye pain, difficulty hearing, vertigo, dysphagia, epistaxis, recent dental work    Respiratory: denies cough, phlegm, wheezing, hemoptysis  Cardiovascular: denies CP, palpitations, edema, diaphoresis   Gastrointestinal: denies nausea, vomiting, diarrhea, constipation, abdominal pain, melena   Genitourinary: denies dysuria, frequency, urgency, incontinence, hematuria   Skin/Breast: denies rash, hives, itching, masses, hair loss   Musculoskeletal: denies myalgias, arthritis, joint swelling, muscle weakness  Neurologic: denies syncope, LOC, headache, weakness, dizziness, parasthesias, numbness, seizures, confusion, dementia   Psychiatric: denies feeling anxious, depressed, suicidal, homicidal  Endocrine: denies increased fingerstick glucoses, cold or heat intolerance, polydipsia, polyuria, polyphagia   Hematology/Oncology: denies bruising, tender or enlarged lymph nodes   ROS negative x 10 systems except as noted above    T(C): 36.4 (19 @ 06:00), Max: 36.5 (19 @ 08:00)  HR: 60 (19 @ 07:00) (32 - 85)  BP: 151/53 (19 @ 07:00) (94/52 - 159/59)  RR: 27 (19 @ 07:00) (13 - 27)  SpO2: 100% (19 @ 07:00) (80% - 100%)      Physical Exam  General: WD, WN, NAD                                                         Neuro: A+O x 3, non-focal, speech clear and intact                     Eyes: PERRL, EOMI   ENT: Normal exam of nasal/oral mucosa with absence of cyanosis.   Neck: supple, no JVD, trachea midline   Chest: CTA, equal bilaterally, no wheezes/rales/rhonchi, normal excursion, no accessory muscle use note  CV: irregular rhythm  GI: soft, NT, ND, +BS, no organomegaly noted  Extremities: GIANG x 4, no C/C/E, distal motor/neuro/circ intact  SKIN: warm, dry, intact

## 2019-07-05 NOTE — BRIEF OPERATIVE NOTE - NSICDXBRIEFPROCEDURE_GEN_ALL_CORE_FT
PROCEDURES:  Pericardial biopsy 05-Jul-2019 17:36:11  Rylie Kaur  Creation, pericardial window, subxiphoid approach 05-Jul-2019 17:35:12  Rylie Kaur

## 2019-07-05 NOTE — PROGRESS NOTE ADULT - ASSESSMENT
73 with htn, hld, asthma presents to the ED for shortness of breath and weakness noted with bradycardia, renal evaluation of ABIOLA.  ABIOLA likely multifactorial from cardiac isues/heart block/bradycardia, ARB playing a role as well as nsaid use. Could have a pre-renal state as well, baseline function of 1.3 mg/dl.    ABIOLA  -IVF hydration to keep net positive, can stop when NPO lifted if hemodynamically stable  -Non-oliguric with function trending towards baseline  -Formal renal songram results  -Maintain ARB on hold  -No acute need for HD    heart block  -CVS/telemetery  -sp PPM, window pending this afternoon    HTN  -ARB hold, bp stable  -IVF if needed for MAP    d/c with RN staff at bedside

## 2019-07-05 NOTE — PROGRESS NOTE ADULT - SUBJECTIVE AND OBJECTIVE BOX
Patient is a 73y Female who reports no complaints, seen earlier. Note now. Seen post PPM, doing well with no breathing issues currently. Reports good uop "its more clear".     REVIEW OF SYSTEMS:    CONSTITUTIONAL: stable weakness, fevers or chills  RESPIRATORY: No cough, wheezing, hemoptysis; No shortness of breath  CARDIOVASCULAR: No chest pain or palpitations  GENITOURINARY: No dysuria, frequency or hematuria  All other review of systems is negative unless indicated above.    MEDICATIONS  (STANDING):  atorvastatin 40 milliGRAM(s) Oral at bedtime  ceFAZolin  Injectable. 1000 milliGRAM(s) IV Push every 8 hours  sodium chloride 0.9%. 1000 milliLiter(s) (100 mL/Hr) IV Continuous <Continuous>    MEDICATIONS  (PRN):  acetaminophen   Tablet .. 650 milliGRAM(s) Oral every 6 hours PRN Mild Pain (1 - 3), Moderate Pain (4 - 6)  ALBUTerol    90 MICROgram(s) HFA Inhaler 2 Puff(s) Inhalation every 6 hours PRN Bronchospasm        T(C): , Max: 36.6 (19 @ 09:45)  T(F): , Max: 97.8 (19 @ 09:45)  HR: 74 (19 @ 15:00)  BP: 178/72 (19 @ 15:00)  BP(mean): 92 (19 @ 15:00)  RR: 27 (19 @ 15:00)  SpO2: 99% (19 15:00)  Wt(kg): --     @ :  -   @ 07:00  --------------------------------------------------------  IN: 1300 mL / OUT: 1000 mL / NET: 300 mL     @ 07:  -   @ 15:13  --------------------------------------------------------  IN: 0 mL / OUT: 400 mL / NET: -400 mL      PHYSICAL EXAM:    Constitutional: NAD  HEENT: PERRLA, EOMI,  MMM  Neck: No LAD, No JVD  Respiratory: good aeration b/l  Cardiovascular: S1 and S2, RRR  Gastrointestinal: BS+, soft, NT/ND  Extremities: No peripheral edema  Neurological: A/O x 3, no focal deficits  : No Alfredo  Skin: No rashes  Access: Not applicable        LABS:                        11.5   8.19  )-----------( 199      ( 2019 08:30 )             35.8     2019 06:16    142    |  111    |  73     ----------------------------<  96     4.2     |  21     |  4.39   2019 08:30    140    |  105    |  81     ----------------------------<  131    4.4     |  22     |  5.51     Ca    8.7        2019 06:16  Ca    9.1        2019 08:30  Phos  4.9       2019 06:16  Mg     2.4       2019 08:30    TPro  6.8    /  Alb  2.7    /  TBili  0.4    /  DBili  x      /  AST  38     /  ALT  53     /  AlkPhos  127    2019 06:16  TPro  7.5    /  Alb  3.1    /  TBili  0.5    /  DBili  x      /  AST  47     /  ALT  56     /  AlkPhos  139    2019 08:30      Hepatitis C Virus Interpretation: Nonreact ( @ 06:16)  Hepatitis C Virus S/CO Ratio: 0.10 S/CO [0.00 - 0.99] ( @ 06:16)      Urine Studies:  Urinalysis Basic - ( 2019 16:00 )    Color: Yellow / Appearance: Clear / S.010 / pH: x  Gluc: x / Ketone: Negative  / Bili: Negative / Urobili: Negative mg/dL   Blood: x / Protein: 30 mg/dL / Nitrite: Negative   Leuk Esterase: Trace / RBC: 6-10 /HPF / WBC 6-10   Sq Epi: x / Non Sq Epi: Few / Bacteria: Occasional      Chloride, Random Urine: 34 mmol/L ( @ 16:00)  Sodium, Random Urine: 45 mmol/L ( @ 16:00)  Creatinine, Random Urine: 94.0 mg/dL ( @ 16:00)  Protein/Creatinine Ratio Calculation: 0.4 Ratio ( @ 16:00)        RADIOLOGY & ADDITIONAL STUDIES:

## 2019-07-05 NOTE — CONSULT NOTE ADULT - ASSESSMENT
72 F with 2nd degree heart block and large pericardial effusion without tamponade.    Spoke with Dr. Wilhelm.    Plan:  Keep NPO.  PPM placement today.  Pericardial window if interventionalist cannot do pericardiocentesis at the same time.

## 2019-07-06 DIAGNOSIS — R06.09 OTHER FORMS OF DYSPNEA: ICD-10-CM

## 2019-07-06 DIAGNOSIS — I44.2 ATRIOVENTRICULAR BLOCK, COMPLETE: ICD-10-CM

## 2019-07-06 LAB
ANION GAP SERPL CALC-SCNC: 12 MMOL/L — SIGNIFICANT CHANGE UP (ref 5–17)
ANION GAP SERPL CALC-SCNC: 8 MMOL/L — SIGNIFICANT CHANGE UP (ref 5–17)
BUN SERPL-MCNC: 67 MG/DL — HIGH (ref 7–23)
BUN SERPL-MCNC: 68 MG/DL — HIGH (ref 7–23)
CALCIUM SERPL-MCNC: 8.1 MG/DL — LOW (ref 8.5–10.1)
CALCIUM SERPL-MCNC: 8.6 MG/DL — SIGNIFICANT CHANGE UP (ref 8.5–10.1)
CHLORIDE SERPL-SCNC: 110 MMOL/L — HIGH (ref 96–108)
CHLORIDE SERPL-SCNC: 112 MMOL/L — HIGH (ref 96–108)
CO2 SERPL-SCNC: 20 MMOL/L — LOW (ref 22–31)
CO2 SERPL-SCNC: 24 MMOL/L — SIGNIFICANT CHANGE UP (ref 22–31)
CREAT SERPL-MCNC: 3.07 MG/DL — HIGH (ref 0.5–1.3)
CREAT SERPL-MCNC: 3.3 MG/DL — HIGH (ref 0.5–1.3)
ERYTHROCYTE [SEDIMENTATION RATE] IN BLOOD: 97 MM/HR — HIGH (ref 0–20)
GLUCOSE SERPL-MCNC: 212 MG/DL — HIGH (ref 70–99)
GLUCOSE SERPL-MCNC: 220 MG/DL — HIGH (ref 70–99)
GRAM STN FLD: SIGNIFICANT CHANGE UP
NIGHT BLUE STAIN TISS: SIGNIFICANT CHANGE UP
POTASSIUM SERPL-MCNC: 4.5 MMOL/L — SIGNIFICANT CHANGE UP (ref 3.5–5.3)
POTASSIUM SERPL-MCNC: 4.6 MMOL/L — SIGNIFICANT CHANGE UP (ref 3.5–5.3)
POTASSIUM SERPL-SCNC: 4.5 MMOL/L — SIGNIFICANT CHANGE UP (ref 3.5–5.3)
POTASSIUM SERPL-SCNC: 4.6 MMOL/L — SIGNIFICANT CHANGE UP (ref 3.5–5.3)
SODIUM SERPL-SCNC: 142 MMOL/L — SIGNIFICANT CHANGE UP (ref 135–145)
SODIUM SERPL-SCNC: 144 MMOL/L — SIGNIFICANT CHANGE UP (ref 135–145)
SPECIMEN SOURCE: SIGNIFICANT CHANGE UP
SPECIMEN SOURCE: SIGNIFICANT CHANGE UP

## 2019-07-06 PROCEDURE — 93010 ELECTROCARDIOGRAM REPORT: CPT

## 2019-07-06 PROCEDURE — ZZZZZ: CPT

## 2019-07-06 PROCEDURE — 99232 SBSQ HOSP IP/OBS MODERATE 35: CPT

## 2019-07-06 RX ORDER — SODIUM CHLORIDE 9 MG/ML
1000 INJECTION INTRAMUSCULAR; INTRAVENOUS; SUBCUTANEOUS
Refills: 0 | Status: DISCONTINUED | OUTPATIENT
Start: 2019-07-06 | End: 2019-07-06

## 2019-07-06 RX ADMIN — Medication 100 MILLIGRAM(S): at 06:26

## 2019-07-06 RX ADMIN — Medication 1000 MILLIGRAM(S): at 06:43

## 2019-07-06 RX ADMIN — ATORVASTATIN CALCIUM 40 MILLIGRAM(S): 80 TABLET, FILM COATED ORAL at 21:37

## 2019-07-06 RX ADMIN — OXYCODONE AND ACETAMINOPHEN 1 TABLET(S): 5; 325 TABLET ORAL at 06:26

## 2019-07-06 RX ADMIN — SODIUM CHLORIDE 100 MILLILITER(S): 9 INJECTION INTRAMUSCULAR; INTRAVENOUS; SUBCUTANEOUS at 08:49

## 2019-07-06 RX ADMIN — OXYCODONE AND ACETAMINOPHEN 1 TABLET(S): 5; 325 TABLET ORAL at 07:25

## 2019-07-06 RX ADMIN — OXYCODONE AND ACETAMINOPHEN 2 TABLET(S): 5; 325 TABLET ORAL at 00:00

## 2019-07-06 RX ADMIN — Medication 100 MILLIGRAM(S): at 21:38

## 2019-07-06 NOTE — PROGRESS NOTE ADULT - SUBJECTIVE AND OBJECTIVE BOX
· Chief Complaint: The patient is a 73y Female complaining of shortness of breath.	  · HPI Objective Statement: 73F hx htn hld asthma presents to the ED for shortness of breath and weakness. Pt states that shortness of breath is present with exertion. states that when she walks to the mailbox she has to stop to catch her breath. mild cough. no sob when laying flat. no cp. no had or dizziness. no diaphoresis. states that she is having work done in her kitchen and there is dust. also states that 1 month ago she was bit by a tick but received doxycycline 1 dose.	    PAST MEDICAL/SURGICAL/FAMILY/SOCIAL HISTORY:     Hypertension  Asthma  Dyslipidemia  Moderate bilateral carotid disease   Alcohol Use History:  · Have you ever consumed alcohol	never	     Tobacco Usage:  · Tobacco Usage	Never smoker	    ALLERGIES AND HOME MEDICATIONS:   Allergies:        Allergies:  	No Known Allergies:     Home Medications:   * Outpatient Medication Status not yet specified    REVIEW OF SYSTEMS:    Review of Systems:  · Review of Systems: Constitutional: No fever or chills  Eyes: No visual changes  HEENT: No throat pain  CV: No chest pain  Resp: + SOB + cough  GI: No abd pain, nausea or vomiting  : No dysuria  MSK: No musculoskeletal pain  Skin: No rash Neuro: No headache	    PHYSICAL EXAM:   · Physical Examination: Constitutional: NAD AAOx3  Eyes: PERRLA EOMI  Head: Normocephalic atraumatic  Mouth: MMM  Cardiac: bradycardic no LE swelling normal peripheral pulses  Resp: Lungs CTAB  GI: Abd s/nt/nd  Neuro: CN2-12 intact Skin: No rashes	    CURRENT ORDERS/:   · 	Activity - Bedrest, 04-Jul-2019, Active, Standard  · 	Cardiac Monitor Bedside,   Time/Priority:  STAT, 04-Jul-2019, Active, Standard  · 	IV Insert,   Time/Priority:  STAT, 04-Jul-2019, Active, Standard  · 	Pulse Oximetry,   Frequency: <Continuous>  	  Additional Instructions:  Notify Provider if oxygen saturation is LESS THAN 92%, 04-Jul-2019, Active, Standard  · 	Vital Signs,   Frequency:  per routine, 04-Jul-2019, Active, Standard    RESULTS:   · EKG Date/Time: 04-Jul-2019 08:25	  · Rate: 42	  · Interpretation: abnormal	  · Abnormal Rhythm: bradycardia	  · Acute or Evolving MI?: no	  · Axis: Right Deviation	  · NJ: 302	  · QRS: 88	  · ST/T Wave: no st elevations	  · Other Findings: 1st degree av block	  · EKG Date/Time: 04-Jul-2019 08:58	  · Rate: 39	  · Interpretation: abnormal	  · Abnormal Rhythm: 3rd degree heart block	  · Acute or Evolving MI?: no	  · Axis: Normal	  · NJ: 98	  · QRS: 88	  · ST/T Wave: no st elevations	    PROGRESS NOTE:   Date: 04-Jul-2019 08:59.     Progress: repeat EKG - pt in 3rd degree heart block - pads placed on patient. bp stable. cardiology paged. Arya López M.D., Attending Physician.     Date: 04-Jul-2019 09:07.     Progress: spoke with Dr. Wilhelm will see patient shortly. Arya López M.D., Attending Physician.     Date: 04-Jul-2019 09:14.     Progress: pt endorsed to DR. Pike for 3rd degree block pt to go to CCU. Arya López M.D., Attending Physician.     Date: 04-Jul-2019 09:20.     Progress: spoke with EP will see patient shortly. Arya López M.D., Attending Physician.    DISPOSITION:    Care Plan - Instructions:  Principal Discharge DX:	Exertional dyspnea  Secondary Diagnosis:	Heart block AV third degree.     Impression:  Principal Discharge Dx Exertional dyspnea.     Secondary Discharge Dx Heart block AV third degree.     Medical Decision Making:  · Physician E/M Selection	[id number] Critical Care - 30 to 74 minutes	  · The following orders were submitted:	Labs, EKG, Imaging Studies, Medications	  · EKG - Performed independent visualization (See EKG Result section above)	Yes	  · Critical Care Indication	patient was critically ill...	  · Critical Care Indication	Patient was critically ill with a high probability of imminent or life threatening deterioration.	  · Critical Care Provided	direct patient care (not related to procedure), additional history taking, interpretation of diagnostic studies, documentation, consultation with other physicians	  · Critical Care Attestation	I have personally provided the amount of critical care time documented below excluding time spent on separate procedures	  · Crtical Care Time Spent (min) Must be 30 or more minutes to qualify:	30	  · Clinical Summary  (MDM): Summarize the clinical encounter	73F hx htn hld asthma presents to the ED for shortness of breath and weakness. Pt states that shortness of breath is present with exertion. states that when she walks to the mailbox she has to stop to catch her breath. mild cough. no sob when laying flat. no cp. no had or dizziness. no diaphoresis. states that she is having work done in her kitchen and there is dust. also states that 1 month ago she was bit by a tick but received doxycycline 1 dose. exam with bradycardic no LE swelling normal peripheral pulses. son at bedside is a physician at . spoke with patient and son regarding care - given new bradycardia concern for cardiac etiology. 1st degree block - will send lyme and trop, touch base with cardiologist. lower suspicion asthma as normal breath sounds no wheezing not tight. will obtain xray cardiac monitor and reassess. Arya López M.D., Attending Physician	        Accepting Physician:   Provider Role	Provider Name	  · Accepting Physician	Froilan Pike	    Prescriptions:   * Outpatient Medication Status not yet specified    ATTESTATION STATEMENT:    Attestations Statements:  Attending Statement: Attending Only.    PROVIDER CARE INITIATION:   · Care Initiated by:	Arya López(Attending)	  · Provider Care Initiated at:	04-Jul-2019 08:01	      Electronic Signatures:  Arya López)  (Signed 04-Jul-2019 09:21)  	Authored: HISTORY OF PRESENT ILLNESS, PAST MEDICAL/SURGICAL/FAMILY/SOCIAL HISTORY, ALLERGIES AND HOME MEDICATIONS, REVIEW OF SYSTEMS, PHYSICAL EXAM, CURRENT ORDERS/, RESULTS, PROGRESS NOTE, DISPOSITION, ATTESTATION STATEMENT, PROVIDER CARE INITIATION      Last Updated: 04-Jul-2019 09:21 by Arya López)    References:  1.  Data Referenced From "ED ADULT Triage Note" 7/4/2019 7:55 AM      7/6/19:  Patient had PPM and a pericardial window yesterday.              REVIEW OF SYSTEMS:    As above  No chest pain + shortness of breath  No lightheadeness or syncope  No leg swelling  No palpitations  No claudication-like symptoms    ICU Vital Signs Last 24 Hrs  T(C): 36.4 (06 Jul 2019 06:59), Max: 36.8 (05 Jul 2019 17:26)  T(F): 97.5 (06 Jul 2019 06:59), Max: 98.3 (05 Jul 2019 17:26)  HR: 73 (06 Jul 2019 07:00) (61 - 88)  BP: 131/56 (06 Jul 2019 07:00) (87/43 - 185/104)  BP(mean): 72 (06 Jul 2019 07:00) (70 - 112)  ABP: 139/64 (06 Jul 2019 06:00) (125/58 - 171/80)  ABP(mean): 90 (06 Jul 2019 06:00) (80 - 97)  RR: 19 (06 Jul 2019 07:00) (11 - 30)  SpO2: 97% (06 Jul 2019 07:00) (90% - 100%)      I&O's Summary      CAPILLARY BLOOD GLUCOSE          PHYSICAL EXAM:   Patient in NAD  Neck: No JVD; Carotids:  2+ without bruits  Respiratory:  Clear to A&P  Cardiovascular: S1 and S2, regular rate + MICHELLE  Gastrointestinal:  Soft, non-tender; BS positive  Extremities: No peripheral edema  Vascular: 2+ peripheral pulses  Neurological: A/O x 3, no focal deficits      MEDICATIONS  (STANDING):  atorvastatin 40 milliGRAM(s) Oral at bedtime  docusate sodium 100 milliGRAM(s) Oral three times a day        LABS: All Labs Reviewed:  Blood Culture:   BNP Serum Pro-Brain Natriuretic Peptide: 8937 pg/mL (07-04 @ 08:30)    CBC             WBC Count: 8.19 K/uL (07-04 @ 08:30)              Hemoglobin: 11.5 g/dL (07-04 @ 08:30)              Hematocrit: 35.8 % (07-04 @ 08:30)              Mean Cell Volume: 90.4 fl (07-04 @ 08:30)              Platelet Count - Automated: 199 K/uL (07-04 @ 08:30)                                       Cardiac markers             Troponin I, Serum: <0.015 ng/mL (07-04 @ 08:30)                             Chems        Sodium, Serum: 140 mmol/L (07-04 @ 08:30)          Potassium, Serum: 4.4 mmol/L (07-04 @ 08:30)          Blood Urea Nitrogen, Serum: 81 mg/dL (07-04 @ 08:30)          Creatinine 5.51          Magnesium, Serum: 2.4 mg/dL (07-04 @ 08:30)          Protein Total, Serum: 7.5 gm/dL (07-04 @ 08:30)                  Calcium, Total Serum: 9.1 mg/dL (07-04 @ 08:30)                  Bilirubin Total, Serum: 0.5 mg/dL (07-04 @ 08:30)          Alanine Aminotransferase (ALT/SGPT): 56 U/L (07-04 @ 08:30)          Aspartate Aminotransferase (AST/SGOT): 47 U/L (07-04 @ 08:30)    07-05    142  |  111<H>  |  73<H>  ----------------------------<  96  4.2   |  21<L>  |  4.39<H>    Ca    8.7      05 Jul 2019 06:16  Mg     2.4     07-04    TPro  6.8  /  Alb  2.7<L>  /  TBili  0.4  /  DBili  x   /  AST  38<H>  /  ALT  53  /  AlkPhos  127<H>  07-05                   INR: 1.11 ratio (07-04 @ 08:30)             RADIOLOGY:    EKG:  Sinus rhythm; 2:1 AV block; old ASWMI(unchanged)    Telemetry:   second degree heart block    ECHO:  < from: Transthoracic Echocardiogram (07.04.19 @ 11:38) >   The left ventricle is normal in size. Estimated left ventricular ejection   fraction is 65-70 %.   The left atrium is moderately dilated.   Significant fibrocalcific changes noted to the aortic valve leaflets with   restriction in leaflet excursion. Peak transaortic gradient is 26mmHg;   this finding is consistent with mild aortic stenosis.   Moderate (2+) aortic regurgitation is present.   Fibrocalcific changes noted to the mitral valve leaflets with restriction   in mitral leaflet excursion. Mean transmitral gradient is 15 mmHg; this   is   finding is consistent with severe mitral stenosis. Mild mitral   regurgitation noted. Impaired relaxation noted.   Moderate posterior mitral annular calcification noted.   The tricuspid valve leaflets are well seen and appear thin and pliable   with preserved leaflets excursion. Mild tricuspid regurgitation noted.   Moderate pulmonary hypertension is suggested.   Severe pulmonary hypertension.   The pulmonicvalve is not well seen. Trace to mild pulmonic regurgitation   noted.   Moderate to large circumferential pericardial effusion noted without echo   evidence for tamponade.    < end of copied text >

## 2019-07-06 NOTE — PROGRESS NOTE ADULT - SUBJECTIVE AND OBJECTIVE BOX
Nurse Practitioner Progress note:   s/p Dual lead PPM implantation POD #1, s/p Pericardial Window POD #1  observed overnight on CCU  Left device implant site with prineal drsg dry and intact-no bleeding or hematoma formation, right sub diaphragmatic chest drsg s/p pericardial window with GARY drain intact   Patient feels well.  Denies chest pain, shortness of breath, dizziness or palpitations at this time.    MDT device interrogated -all device pacing and sensing thresholds WNL  CXR-No PTX-Left pleural effusion    EKG: A-V pacing at 68, underlying sinus  TELE :no ectopy, A_V pacing at 68  GENERAL: appears much improved, SOB improved  CV: RRR, S1 S2, no murmur, rub, clicks or gallop noted  RESP: LCTA bilaterally, no wheeze, no rhonchi or crackles noted  GI/: soft, NT/ND  NEURO: AOx4, no focal deficits  EXTREMITIES: no edema, clubbing or cyanosis noted  PROCEDURE SITE:  Left device implant site with prineal drsg dry and intact-no bleeding or hematoma formation, right sub diaphragmatic chest drsg s/p pericardial window with GARY drain intact       T(C): 36.4 (19 @ 06:59), Max: 36.8 (19 @ 17:26)  HR: 73 (19 @ 07:00) (61 - 88)  BP: 131/56 (19 @ 07:00) (87/43 - 185/104)  RR: 19 (19 @ 07:00) (11 - 30)  SpO2: 97% (19 @ 07:00) (90% - 100%)  Wt(kg): --        144  |  112<H>  |  67<H>  ----------------------------<  220<H>  4.6   |  20<L>  |  3.30<H>    Ca    8.6      2019 08:40  Phos  4.9     -    TPro  6.8  /  Alb  2.7<L>  /  TBili  0.4  /  DBili  x   /  AST  38<H>  /  ALT  53  /  AlkPhos  127<H>      CARDIAC MARKERS ( 2019 11:40 )  <0.015 ng/mL / x     / x     / x     / x              MEDICATIONS  (STANDING):  atorvastatin 40 milliGRAM(s) Oral at bedtime  docusate sodium 100 milliGRAM(s) Oral three times a day  sodium chloride 0.9%. 1000 milliLiter(s) (100 mL/Hr) IV Continuous <Continuous>    MEDICATIONS  (PRN):  ALBUTerol    90 MICROgram(s) HFA Inhaler 2 Puff(s) Inhalation every 6 hours PRN Bronchospasm  fentaNYL    Injectable 50 MICROGram(s) IV Push every 5 minutes PRN Severe Pain (7 - 10)  oxyCODONE    5 mG/acetaminophen 325 mG 1 Tablet(s) Oral every 4 hours PRN Mild Pain (1 - 3)  oxyCODONE    5 mG/acetaminophen 325 mG 2 Tablet(s) Oral every 6 hours PRN Moderate Pain (4 - 6)  senna 2 Tablet(s) Oral at bedtime PRN Constipation        HPI:  72 yo female h/o Htn, HLD, Mild Intermittent Asthma, presents to the ED for progressive dyspnea over the past few days; no fever/chills, no chest pain, no coughing.   she was found to have a 3rd degree heart block.  She also states that she was bitten by an insect 2 weeks ago, but didn't get a rash. A prophylactic dose of Doxy was given at the time.    PMHx: as above  PSHx: no sign surgeries  SocHx: no smoking, no Etoh  FamHx: brother  of MI age 60            REVIEW OF SYSTEMS:    CONSTITUTIONAL: No weakness, No fevers or chills  ENT: No ear ache, No sorethroat  NECK: No pain, No stiffness  RESPIRATORY: No cough, No wheezing, No hemoptysis; + dyspnea  CARDIOVASCULAR: No chest pain, No palpitations  GASTROINTESTINAL: No abd pain, No nausea, No vomiting, No hematemesis, No diarrhea or constipation. No melena, No hematochezia.  GENITOURINARY: No dysuria, No  hematuria  NEUROLOGICAL: No diplopia, No paresthesia, No motor dysfunction  MUSCULOSKELETAL: No arthralgia, No myalgia  SKIN: No rashes, or lesions   PSYCH: no anxiety, no suicidal ideation    All other review of systems is negative unless indicated above    Vital Signs Last 24 Hrs  T(C): 36.5 (2019 08:00), Max: 36.5 (2019 08:00)  T(F): 97.7 (2019 08:00), Max: 97.7 (2019 08:00)  HR: 41 (2019 08:00) (41 - 41)  BP: 159/59 (2019 08:00) (159/59 - 159/59)  BP(mean): --  RR: 15 (2019 08:00) (15 - 15)  SpO2: 97% (2019 08:00) (97% - 97%)    PHYSICAL EXAM:    GENERAL: NAD, Well nourished  HEENT:  NC/AT, EOMI, PERRLA, No scleral icterus, Moist mucous membranes  NECK: Supple, No JVD  CNS:  Alert & Oriented X3, Motor Strength 5/5 B/L upper and lower extremities; DTRs 2+ intact   LUNG: Normal Breath sounds, Clear to auscultation bilaterally, No rales, No rhonchi, No wheezing  HEART: RRR; No murmurs, No rubs  ABDOMEN: +BS, ST/ND/NT  GENITOURINARY: Voiding, Bladder not distended  EXTREMITIES:  2+ Peripheral Pulses, No clubbing, No cyanosis, No tibial edema  MUSCULOSKELTAL: Joints normal ROM, No TTP, No effusion  VAGINAL: deferred  SKIN: no rashes                            11.5   8.19  )-----------( 199      ( 2019 08:30 )             35.8     07-04    140  |  105  |  81<H>  ----------------------------<  131<H>  4.4   |  22  |  5.51<H>    Ca    9.1      2019 08:30  Mg     2.4     07-04    TPro  7.5  /  Alb  3.1<L>  /  TBili  0.5  /  DBili  x   /  AST  47<H>  /  ALT  56  /  AlkPhos  139<H>  07-04    Vancomycin levels:   Cultures:     MEDICATIONS  (STANDING):  atorvastatin 40 milliGRAM(s) Oral at bedtime  sodium chloride 0.9%. 1000 milliLiter(s) (100 mL/Hr) IV Continuous <Continuous>    MEDICATIONS  (PRN):  ALBUTerol    90 MICROgram(s) HFA Inhaler 2 Puff(s) Inhalation every 6 hours PRN Bronchospasm      Ekg: complete heart block  Cxray: cardiomegaly, increased vascular congestion          ASSESSMENT/PLAN:    CHB now s/p Dual lead PPM implantation POD#1, s/p Pericardial window POD #1  -continue CCU management and care  -Encourage PO fluids  -Plan of care discussed with patient, and nursing staff  -labs, EKG and procedure site assessed  -Follow-up with Dr Silva as outpt in 2 weeks  -Education r/t wound care, device function discussed and provided

## 2019-07-06 NOTE — PROGRESS NOTE ADULT - ASSESSMENT
73 year old woman with the above history admitted with symptomatic heart block but also with new onset acute renal insufficiency.  This may be related to ATN perhaps from hypotension related to her heart block and some degree of intravascular depletion along with her ARB therapy.  Will give IV hydration and hold hyzaar.  If creatinine improves by tomorrow would then proceed with PPM implantation for her heart block.  Check TTE    7/5/19:  Patient with moderate to large pericardial effusion.  Is to go for drainage today by Dr White.  Creatinine improving.  Mitral valve disease has worsened since her echo 18 months ago(which reveal a small to moderate pericardial effusion as well).  Continue IV hydration.  Blood work sent off for autoimmune work up.  Will revue TTE personally.  Also for PPM insertion.    7/6/19:  Patient appears stable post PPM insertion and pericardial window.  Awaiting results from pericardial biopsy/fluid analysis.  Also awaiting today's BMP results.

## 2019-07-06 NOTE — PROGRESS NOTE ADULT - SUBJECTIVE AND OBJECTIVE BOX
Patient is a 73y Female who reports no complaints, seen earlier. Note now. Seen post PPM, doing well with no breathing issues currently. Reports good uop "its more clear".     7/6  s/p pericardial window  feels well  creat coming down nicely      REVIEW OF SYSTEMS:    CONSTITUTIONAL: stable weakness, fevers or chills  RESPIRATORY: No cough, wheezing, hemoptysis; No shortness of breath  CARDIOVASCULAR: No chest pain or palpitations  GENITOURINARY: No dysuria, frequency or hematuria  All other review of systems is negative unless indicated above.    MEDICATIONS  (STANDING):  atorvastatin 40 milliGRAM(s) Oral at bedtime  docusate sodium 100 milliGRAM(s) Oral three times a day  sodium chloride 0.9%. 1000 milliLiter(s) (100 mL/Hr) IV Continuous <Continuous>    MEDICATIONS  (PRN):  ALBUTerol    90 MICROgram(s) HFA Inhaler 2 Puff(s) Inhalation every 6 hours PRN Bronchospasm  fentaNYL    Injectable 50 MICROGram(s) IV Push every 5 minutes PRN Severe Pain (7 - 10)  oxyCODONE    5 mG/acetaminophen 325 mG 1 Tablet(s) Oral every 4 hours PRN Mild Pain (1 - 3)  oxyCODONE    5 mG/acetaminophen 325 mG 2 Tablet(s) Oral every 6 hours PRN Moderate Pain (4 - 6)  senna 2 Tablet(s) Oral at bedtime PRN Constipation      I&O's Detail    05 Jul 2019 07:01  -  06 Jul 2019 07:00  --------------------------------------------------------  IN:    Oral Fluid: 240 mL    Other: 700 mL  Total IN: 940 mL    OUT:    Bulb: 150 mL    Voided: 800 mL  Total OUT: 950 mL    Total NET: -10 mL              Vital Signs Last 24 Hrs  T(C): 36.4 (06 Jul 2019 06:59), Max: 36.8 (05 Jul 2019 17:26)  T(F): 97.5 (06 Jul 2019 06:59), Max: 98.3 (05 Jul 2019 17:26)  HR: 66 (06 Jul 2019 10:00) (61 - 88)  BP: 123/55 (06 Jul 2019 10:00) (87/43 - 185/104)  BP(mean): 71 (06 Jul 2019 10:00) (68 - 112)  RR: 18 (06 Jul 2019 10:00) (11 - 30)  SpO2: 96% (06 Jul 2019 09:00) (90% - 100%)    PHYSICAL EXAM:    Constitutional: NAD  HEENT: PERRLA, EOMI,  MMM  Neck: No LAD, No JVD  Respiratory: good aeration b/l  Cardiovascular: S1 and S2, RRR, Pericardial drain in place  Gastrointestinal: BS+, soft, NT/ND  Extremities: No peripheral edema  Neurological: A/O x 3, no focal deficits  : No Alfredo  Skin: No rashes  Access: Not applicable        LABS:                07-06    144  |  112<H>  |  67<H>  ----------------------------<  220<H>  4.6   |  20<L>  |  3.30<H>    Ca    8.6      06 Jul 2019 08:40  Phos  4.9     07-05    TPro  6.8  /  Alb  2.7<L>  /  TBili  0.4  /  DBili  x   /  AST  38<H>  /  ALT  53  /  AlkPhos  127<H>  07-05

## 2019-07-06 NOTE — PROGRESS NOTE ADULT - PROBLEM SELECTOR PLAN 1
CHB now s/p Dual lead PPM implantation POD#1, s/p Pericardial window POD #1  -continue CCU management and care  -Encourage PO fluids  -Plan of care discussed with patient, and nursing staff  -labs, EKG and procedure site assessed  -Follow-up with Dr Silva as outpt in 2 weeks  -Education r/t wound care, device function discussed and provided

## 2019-07-06 NOTE — PROGRESS NOTE ADULT - ASSESSMENT
73 with htn, hld, asthma presents to the ED for shortness of breath and weakness noted with bradycardia, renal evaluation of ABIOLA.  ABIOLA likely multifactorial from cardiac isues/heart block/bradycardia, ARB playing a role as well as nsaid use. Could have a pre-renal state as well, baseline function of 1.3 mg/dl.    ABIOLA  -IVF hydration to keep net positive, can stop when NPO lifted if hemodynamically stable  -Non-oliguric with function trending towards baseline  -Formal renal songram results  -Maintain ARB on hold  -No acute need for HD    heart block  -CVS/telemetery  -sp PPM, window pending this afternoon    HTN  -ARB hold, bp stable  -IVF if needed for MAP    d/c with RN staff at bedside    7/6  ABIOLA improving  cont to hold arb  renal sono no acute path  on IVF

## 2019-07-06 NOTE — PROGRESS NOTE ADULT - SUBJECTIVE AND OBJECTIVE BOX
CC/reason for follow up: *    S: *    ROS: *    T(C): 36.4 (07-06-19 @ 16:34), Max: 36.8 (07-05-19 @ 17:26)  HR: 69 (07-06-19 @ 16:00) (61 - 88)  BP: 99/77 (07-06-19 @ 16:00) (99/77 - 166/98)  RR: 20 (07-06-19 @ 16:00) (11 - 22)  SpO2: 96% (07-06-19 @ 16:00) (95% - 100%)    Gen - Pleasant, cooperative, in no acute distress  HEENT- PERRL, moist mucus membranes, OP clear  CV - RRR, No m/r/g, +pulses, * edema  Lungs - Good effort, Clear to auscultation bilaterally  Abdomen - Soft, nontender, nondistended, +BS, No rebound/rigidity/guarding  Ext - No cyanosis/clubbing.   Skin - No rashes, No jaundice  Psych- Alert & oriented x 3  Neuro- *    ALBUTerol    90 MICROgram(s) HFA Inhaler 2 Puff(s) Inhalation every 6 hours PRN  atorvastatin 40 milliGRAM(s) Oral at bedtime  docusate sodium 100 milliGRAM(s) Oral three times a day  fentaNYL    Injectable 50 MICROGram(s) IV Push every 5 minutes PRN  oxyCODONE    5 mG/acetaminophen 325 mG 1 Tablet(s) Oral every 4 hours PRN  oxyCODONE    5 mG/acetaminophen 325 mG 2 Tablet(s) Oral every 6 hours PRN  senna 2 Tablet(s) Oral at bedtime PRN            Diagnostic studies: personally reviewed  LABS: All Labs Reviewed:    07-06    144  |  112<H>  |  67<H>  ----------------------------<  220<H>  4.6   |  20<L>  |  3.30<H>    Ca    8.6      06 Jul 2019 08:40  Phos  4.9     07-05    TPro  6.8  /  Alb  2.7<L>  /  TBili  0.4  /  DBili  x   /  AST  38<H>  /  ALT  53  /  AlkPhos  127<H>  07-05            Blood Culture: 07-05 @ 03:30  Organism --  Gram Stain Blood -- Gram Stain   polymorphonuclear leukocytes seen  No organisms seen  by cytocentrifuge  Specimen Source .Body Fluid PERICARDIAL FLUID FOR CULTURE  Culture-Blood --      RADIOLOGY/EKG:            Assessment and Plan:  73F.  admitted 07/04/2019.  presented to ED c/o dyspnea.  progressive in nature.  onset 2-3 days prior to admission.  incidently reports hx of recent insect bite ~ 2 weeks ago.  in ED, found to be in 3rd degree HB.  prophylactic does of doxycycline given.    PMHx:  moderate b/l carotid artery disease;  HTN;  HLD;  mild intermittent asthma    Assessment:  1.	3rd degree HB.  2.	moderate to large pericardial effusion.  3.	ABIOLA.  4.	hx HTN.  5.	hx asthma.    Plan:  -TTE LVEF 65-70%.  mod AI.  mod pHTN.  mod-large pericardial effusion-no tamponade.  -PPM placement this AM.  -pericardiocentesis, +/- pericardial window.    -TSH, rheumatologic and infectious serologies.  -renal US, no obstruction.  avoid NSAIDs and ARB.  IVFs (keep net positive to improve MAP).  -c/w chronic medical issue management.  -DVT prophylaxis.  -discussed w/ RN  *    Code status: *  Dispo: *  ELOS: *    All questions/concerns were discussed with patient/family by bedside.    Case d/w *    Total time spent: *min. More than 50% of time was spent in counseling, discussion of medications, and coordination of care CC/reason for follow up: pericardial eff    S: pain is controlled    ROS: no nausea. no dyspnea    T(C): 36.4 (07-06-19 @ 16:34), Max: 36.8 (07-05-19 @ 17:26)  HR: 69 (07-06-19 @ 16:00) (61 - 88)  BP: 99/77 (07-06-19 @ 16:00) (99/77 - 166/98)  RR: 20 (07-06-19 @ 16:00) (11 - 22)  SpO2: 96% (07-06-19 @ 16:00) (95% - 100%)    Gen - Pleasant, cooperative, in no acute distress  HEENT- PERRL, moist mucus membranes, OP clear  CV - RRR, No m/r/g, +pulses, no edema. drain intact w/ serosang fluid  Lungs - Good effort, Clear to auscultation bilaterally  Abdomen - Soft, nontender, nondistended, +BS, No rebound/rigidity/guarding  Ext - No cyanosis/clubbing.   Skin - No rashes, No jaundice  Psych- Alert & oriented x 3  Neuro- fluent speech, no facial droop, EOMI. moves all ext    ALBUTerol    90 MICROgram(s) HFA Inhaler 2 Puff(s) Inhalation every 6 hours PRN  atorvastatin 40 milliGRAM(s) Oral at bedtime  docusate sodium 100 milliGRAM(s) Oral three times a day  fentaNYL    Injectable 50 MICROGram(s) IV Push every 5 minutes PRN  oxyCODONE    5 mG/acetaminophen 325 mG 1 Tablet(s) Oral every 4 hours PRN  oxyCODONE    5 mG/acetaminophen 325 mG 2 Tablet(s) Oral every 6 hours PRN  senna 2 Tablet(s) Oral at bedtime PRN            Diagnostic studies: personally reviewed  LABS: All Labs Reviewed:    07-06    144  |  112<H>  |  67<H>  ----------------------------<  220<H>  4.6   |  20<L>  |  3.30<H>    Ca    8.6      06 Jul 2019 08:40  Phos  4.9     07-05    TPro  6.8  /  Alb  2.7<L>  /  TBili  0.4  /  DBili  x   /  AST  38<H>  /  ALT  53  /  AlkPhos  127<H>  07-05            Blood Culture: 07-05 @ 03:30  Organism --  Gram Stain Blood -- Gram Stain   polymorphonuclear leukocytes seen  No organisms seen  by cytocentrifuge  Specimen Source .Body Fluid PERICARDIAL FLUID FOR CULTURE  Culture-Blood --      RADIOLOGY/EKG:      < from: Xray Chest 1 View- PORTABLE-Urgent (07.05.19 @ 17:54) >  Chest:  one view.      Comparison: 07/05/2019    AP radiograph of the chest demonstrates small LEFT pleural effusion with   underlying atelectasis. Mild vascular congestion. Pacemaker. The cardiac   silhouette is normal in size. Osseous structures are intact.    Impression:small LEFT pleural effusion with underlying atelectasis. Mild   vascular congestion. Pacemaker.    < end of copied text >        Assessment and Plan:  73F.  admitted 07/04/2019.  presented to ED c/o dyspnea.  progressive in nature.  onset 2-3 days prior to admission.  incidently reports hx of recent insect bite ~ 2 weeks ago.  in ED, found to be in 3rd degree HB.  prophylactic does of doxycycline given.    PMHx:  moderate b/l carotid artery disease;  HTN;  HLD;  mild intermittent asthma    Assessment:  1.	3rd degree HB.  2.	moderate to large pericardial effusion.  3.	ABIOLA.  4.	hx HTN.  5.	hx asthma.    Plan:  -TTE LVEF 65-70%.  mod AI.  mod pHTN.  mod-large pericardial effusion-no tamponade.  -PPM placement 7/5  -pericardiocentesis, +/- pericardial window.    -TSH, rheumatologic and infectious serologies. culture NGTD  -renal US, no obstruction.  avoid NSAIDs and ARB.  IVFs (keep net positive to improve MAP).  -c/w chronic medical issue management.  -DVT prophylaxis.  -discussed w/ RN    Code status: full  Dispo: inpatient  ELOS: > 2 nights    All questions/concerns were discussed with patient/family by bedside.    Total time spent: 35min. More than 50% of time was spent in counseling, discussion of medications, and coordination of care

## 2019-07-07 DIAGNOSIS — I31.3 PERICARDIAL EFFUSION (NONINFLAMMATORY): ICD-10-CM

## 2019-07-07 LAB
A PHAGOCYTOPH IGG TITR SER IF: SIGNIFICANT CHANGE UP
A PHAGOCYTOPH IGM SER QL: SIGNIFICANT CHANGE UP
A PHAGOCYTOPH IGM SER QL: SIGNIFICANT CHANGE UP
A PHAGOCYTOPH IGM TITR SER IF: SIGNIFICANT CHANGE UP
ADD ON TEST-SPECIMEN IN LAB: SIGNIFICANT CHANGE UP
ANAPLASMA PHAGOCYTO IGM COMENT: SIGNIFICANT CHANGE UP
ANAPLASMA PHAGOCYTO IGM INTERP: SIGNIFICANT CHANGE UP
CRP SERPL-MCNC: 14.49 MG/DL — HIGH (ref 0–0.4)
E CHAFFEENSIS IGG TITR SER IF: SIGNIFICANT CHANGE UP
E CHAFFEENSIS IGG TITR SER IF: SIGNIFICANT CHANGE UP
E CHAFFEENSIS IGG TITR SER: SIGNIFICANT CHANGE UP
E CHAFFEENSIS IGM TITR SER IF: SIGNIFICANT CHANGE UP
EHRLICHIA CHAFFEENSIS IGG COMENT: SIGNIFICANT CHANGE UP
EHRLICHIA CHAFFEENSIS IGG INTERP: SIGNIFICANT CHANGE UP
HCT VFR BLD CALC: 30.8 % — LOW (ref 34.5–45)
HGB BLD-MCNC: 10.1 G/DL — LOW (ref 11.5–15.5)
MCHC RBC-ENTMCNC: 29.3 PG — SIGNIFICANT CHANGE UP (ref 27–34)
MCHC RBC-ENTMCNC: 32.8 GM/DL — SIGNIFICANT CHANGE UP (ref 32–36)
MCV RBC AUTO: 89.3 FL — SIGNIFICANT CHANGE UP (ref 80–100)
PLATELET # BLD AUTO: 181 K/UL — SIGNIFICANT CHANGE UP (ref 150–400)
RBC # BLD: 3.45 M/UL — LOW (ref 3.8–5.2)
RBC # FLD: 12.4 % — SIGNIFICANT CHANGE UP (ref 10.3–14.5)
T3 SERPL-MCNC: 58 NG/DL — LOW (ref 80–200)
T4 AB SER-ACNC: 6.1 UG/DL — SIGNIFICANT CHANGE UP (ref 4.6–12)
TSH SERPL-MCNC: 0.12 UU/ML — LOW (ref 0.34–4.82)
WBC # BLD: 11.95 K/UL — HIGH (ref 3.8–10.5)
WBC # FLD AUTO: 11.95 K/UL — HIGH (ref 3.8–10.5)

## 2019-07-07 PROCEDURE — ZZZZZ: CPT

## 2019-07-07 RX ORDER — DIPHENHYDRAMINE HCL 50 MG
50 CAPSULE ORAL ONCE
Refills: 0 | Status: COMPLETED | OUTPATIENT
Start: 2019-07-07 | End: 2019-07-07

## 2019-07-07 RX ORDER — POLYETHYLENE GLYCOL 3350 17 G/17G
17 POWDER, FOR SOLUTION ORAL DAILY
Refills: 0 | Status: DISCONTINUED | OUTPATIENT
Start: 2019-07-07 | End: 2019-07-09

## 2019-07-07 RX ADMIN — OXYCODONE AND ACETAMINOPHEN 2 TABLET(S): 5; 325 TABLET ORAL at 10:50

## 2019-07-07 RX ADMIN — POLYETHYLENE GLYCOL 3350 17 GRAM(S): 17 POWDER, FOR SOLUTION ORAL at 13:52

## 2019-07-07 RX ADMIN — ATORVASTATIN CALCIUM 40 MILLIGRAM(S): 80 TABLET, FILM COATED ORAL at 21:08

## 2019-07-07 RX ADMIN — Medication 50 MILLIGRAM(S): at 02:36

## 2019-07-07 RX ADMIN — OXYCODONE AND ACETAMINOPHEN 2 TABLET(S): 5; 325 TABLET ORAL at 11:20

## 2019-07-07 NOTE — PROGRESS NOTE ADULT - REASON FOR ADMISSION
Patient is a 73y old  Female who presents with a chief complaint of Heart Block, Bradycardia (04 Jul 2019 09:58)
No acute changes
pericardial effusion
SOB

## 2019-07-07 NOTE — PROGRESS NOTE ADULT - SUBJECTIVE AND OBJECTIVE BOX
· Chief Complaint: The patient is a 73y Female complaining of shortness of breath.	  · HPI Objective Statement: 73F hx htn hld asthma presents to the ED for shortness of breath and weakness. Pt states that shortness of breath is present with exertion. states that when she walks to the mailbox she has to stop to catch her breath. mild cough. no sob when laying flat. no cp. no had or dizziness. no diaphoresis. states that she is having work done in her kitchen and there is dust. also states that 1 month ago she was bit by a tick but received doxycycline 1 dose.	    PAST MEDICAL/SURGICAL/FAMILY/SOCIAL HISTORY:     Hypertension  Asthma  Dyslipidemia  Moderate bilateral carotid disease   Alcohol Use History:  · Have you ever consumed alcohol	never	     Tobacco Usage:  · Tobacco Usage	Never smoker	    ALLERGIES AND HOME MEDICATIONS:   Allergies:        Allergies:  	No Known Allergies:     Home Medications:   * Outpatient Medication Status not yet specified    REVIEW OF SYSTEMS:    Review of Systems:  · Review of Systems: Constitutional: No fever or chills  Eyes: No visual changes  HEENT: No throat pain  CV: No chest pain  Resp: + SOB + cough  GI: No abd pain, nausea or vomiting  : No dysuria  MSK: No musculoskeletal pain  Skin: No rash Neuro: No headache	    PHYSICAL EXAM:   · Physical Examination: Constitutional: NAD AAOx3  Eyes: PERRLA EOMI  Head: Normocephalic atraumatic  Mouth: MMM  Cardiac: bradycardic no LE swelling normal peripheral pulses  Resp: Lungs CTAB  GI: Abd s/nt/nd  Neuro: CN2-12 intact Skin: No rashes	    CURRENT ORDERS/:   · 	Activity - Bedrest, 04-Jul-2019, Active, Standard  · 	Cardiac Monitor Bedside,   Time/Priority:  STAT, 04-Jul-2019, Active, Standard  · 	IV Insert,   Time/Priority:  STAT, 04-Jul-2019, Active, Standard  · 	Pulse Oximetry,   Frequency: <Continuous>  	  Additional Instructions:  Notify Provider if oxygen saturation is LESS THAN 92%, 04-Jul-2019, Active, Standard  · 	Vital Signs,   Frequency:  per routine, 04-Jul-2019, Active, Standard    RESULTS:   · EKG Date/Time: 04-Jul-2019 08:25	  · Rate: 42	  · Interpretation: abnormal	  · Abnormal Rhythm: bradycardia	  · Acute or Evolving MI?: no	  · Axis: Right Deviation	  · IN: 302	  · QRS: 88	  · ST/T Wave: no st elevations	  · Other Findings: 1st degree av block	  · EKG Date/Time: 04-Jul-2019 08:58	  · Rate: 39	  · Interpretation: abnormal	  · Abnormal Rhythm: 3rd degree heart block	  · Acute or Evolving MI?: no	  · Axis: Normal	  · IN: 98	  · QRS: 88	  · ST/T Wave: no st elevations	    PROGRESS NOTE:   Date: 04-Jul-2019 08:59.     Progress: repeat EKG - pt in 3rd degree heart block - pads placed on patient. bp stable. cardiology paged. Arya López M.D., Attending Physician.     Date: 04-Jul-2019 09:07.     Progress: spoke with Dr. Wilhelm will see patient shortly. Arya López M.D., Attending Physician.     Date: 04-Jul-2019 09:14.     Progress: pt endorsed to DR. Pike for 3rd degree block pt to go to CCU. Arya López M.D., Attending Physician.     Date: 04-Jul-2019 09:20.     Progress: spoke with EP will see patient shortly. Arya López M.D., Attending Physician.    DISPOSITION:    Care Plan - Instructions:  Principal Discharge DX:	Exertional dyspnea  Secondary Diagnosis:	Heart block AV third degree.     Impression:  Principal Discharge Dx Exertional dyspnea.     Secondary Discharge Dx Heart block AV third degree.     Medical Decision Making:  · Physician E/M Selection	[id number] Critical Care - 30 to 74 minutes	  · The following orders were submitted:	Labs, EKG, Imaging Studies, Medications	  · EKG - Performed independent visualization (See EKG Result section above)	Yes	  · Critical Care Indication	patient was critically ill...	  · Critical Care Indication	Patient was critically ill with a high probability of imminent or life threatening deterioration.	  · Critical Care Provided	direct patient care (not related to procedure), additional history taking, interpretation of diagnostic studies, documentation, consultation with other physicians	  · Critical Care Attestation	I have personally provided the amount of critical care time documented below excluding time spent on separate procedures	  · Crtical Care Time Spent (min) Must be 30 or more minutes to qualify:	30	  · Clinical Summary  (MDM): Summarize the clinical encounter	73F hx htn hld asthma presents to the ED for shortness of breath and weakness. Pt states that shortness of breath is present with exertion. states that when she walks to the mailbox she has to stop to catch her breath. mild cough. no sob when laying flat. no cp. no had or dizziness. no diaphoresis. states that she is having work done in her kitchen and there is dust. also states that 1 month ago she was bit by a tick but received doxycycline 1 dose. exam with bradycardic no LE swelling normal peripheral pulses. son at bedside is a physician at . spoke with patient and son regarding care - given new bradycardia concern for cardiac etiology. 1st degree block - will send lyme and silvia, touch base with cardiologist. lower suspicion asthma as normal breath sounds no wheezing not tight. will obtain xray cardiac monitor and reassess. Arya López M.D., Attending Physician	        Accepting Physician:   Provider Role	Provider Name	  · Accepting Physician	Froilan Pike	    Prescriptions:   * Outpatient Medication Status not yet specified    ATTESTATION STATEMENT:    Attestations Statements:  Attending Statement: Attending Only.    PROVIDER CARE INITIATION:   · Care Initiated by:	Arya López(Attending)	  · Provider Care Initiated at:	04-Jul-2019 08:01	      Electronic Signatures:  Arya López)  (Signed 04-Jul-2019 09:21)  	Authored: HISTORY OF PRESENT ILLNESS, PAST MEDICAL/SURGICAL/FAMILY/SOCIAL HISTORY, ALLERGIES AND HOME MEDICATIONS, REVIEW OF SYSTEMS, PHYSICAL EXAM, CURRENT ORDERS/, RESULTS, PROGRESS NOTE, DISPOSITION, ATTESTATION STATEMENT, PROVIDER CARE INITIATION      Last Updated: 04-Jul-2019 09:21 by Arya López)    References:  1.  Data Referenced From "ED ADULT Triage Note" 7/4/2019 7:55 AM      7/6/19:  Patient had PPM and a pericardial window yesterday.    7/7/19:  Patient had a CXR yesterday and it showed some CHF and her IV fluids were stopped            REVIEW OF SYSTEMS:    As above  No chest pain + shortness of breath  No lightheadeness or syncope  No leg swelling  No palpitations  No claudication-like symptoms    ICU Vital Signs Last 24 Hrs  T(C): 36 (07 Jul 2019 04:00), Max: 36.5 (06 Jul 2019 11:50)  T(F): 96.8 (07 Jul 2019 04:00), Max: 97.7 (06 Jul 2019 11:50)  HR: 75 (07 Jul 2019 09:12) (60 - 108)  BP: 117/46 (07 Jul 2019 09:12) (99/77 - 136/58)  BP(mean): 63 (07 Jul 2019 09:12) (57 - 108)  ABP: 124/56 (06 Jul 2019 14:00) (112/48 - 124/56)  ABP(mean): 80 (06 Jul 2019 14:00) (71 - 80)  RR: 18 (07 Jul 2019 09:12) (0 - 22)  SpO2: 99% (07 Jul 2019 08:00) (95% - 100%)        I&O's Summary      CAPILLARY BLOOD GLUCOSE          PHYSICAL EXAM:   Patient in NAD  Neck: No JVD; Carotids:  2+ without bruits  Respiratory:  Clear to A&P  Cardiovascular: S1 and S2, regular rate + MICHELLE  Gastrointestinal:  Soft, non-tender; BS positive  Extremities: No peripheral edema  Vascular: 2+ peripheral pulses  Neurological: A/O x 3, no focal deficits      MEDICATIONS  (STANDING):  atorvastatin 40 milliGRAM(s) Oral at bedtime  docusate sodium 100 milliGRAM(s) Oral three times a day        LABS: All Labs Reviewed:  Blood Culture:   BNP Serum Pro-Brain Natriuretic Peptide: 8937 pg/mL (07-04 @ 08:30)    CBC             WBC Count: 8.19 K/uL (07-04 @ 08:30)              Hemoglobin: 11.5 g/dL (07-04 @ 08:30)              Hematocrit: 35.8 % (07-04 @ 08:30)              Mean Cell Volume: 90.4 fl (07-04 @ 08:30)              Platelet Count - Automated: 199 K/uL (07-04 @ 08:30)                        10.1   11.95 )-----------( 181      ( 07 Jul 2019 06:15 )             30.8                                          Cardiac markers             Troponin I, Serum: <0.015 ng/mL (07-04 @ 08:30)                             [place]ms        Sodium, Serum: 140 mmol/L (07-04 @ 08:30)          Potassium, Serum: 4.4 mmol/L (07-04 @ 08:30)          Blood Urea Nitrogen, Serum: 81 mg/dL (07-04 @ 08:30)          Creatinine 5.51          Magnesium, Serum: 2.4 mg/dL (07-04 @ 08:30)          Protein Total, Serum: 7.5 gm/dL (07-04 @ 08:30)                  Calcium, Total Serum: 9.1 mg/dL (07-04 @ 08:30)                  Bilirubin Total, Serum: 0.5 mg/dL (07-04 @ 08:30)          Alanine Aminotransferase (ALT/SGPT): 56 U/L (07-04 @ 08:30)          Aspartate Aminotransferase (AST/SGOT): 47 U/L (07-04 @ 08:30)    07-05    142  |  111<H>  |  73<H>  ----------------------------<  96  4.2   |  21<L>  |  4.39<H>    Ca    8.7      05 Jul 2019 06:16  Mg     2.4     07-04    TPro  6.8  /  Alb  2.7<L>  /  TBili  0.4  /  DBili  x   /  AST  38<H>  /  ALT  53  /  AlkPhos  127<H>  07-05 07-06    142  |  110<H>  |  68<H>  ----------------------------<  212<H>  4.5   |  24  |  3.07<H>    Ca    8.1<L>      06 Jul 2019 19:49                       INR: 1.11 ratio (07-04 @ 08:30)             Sedimentation Rate, Erythrocyte: 97 mm/hr (07.06.19 @ 19:49)  C-Reactive Protein, Serum (07.06.19 @ 19:49)    C-Reactive Protein, Serum: 14.49 mg/dL        RADIOLOGY:    EKG:  Sinus rhythm; 2:1 AV block; old ASWMI(unchanged)    Telemetry:   second degree heart block    ECHO:  < from: Transthoracic Echocardiogram (07.04.19 @ 11:38) >   The left ventricle is normal in size. Estimated left ventricular ejection   fraction is 65-70 %.   The left atrium is moderately dilated.   Significant fibrocalcific changes noted to the aortic valve leaflets with   restriction in leaflet excursion. Peak transaortic gradient is 26mmHg;   this finding is consistent with mild aortic stenosis.   Moderate (2+) aortic regurgitation is present.   Fibrocalcific changes noted to the mitral valve leaflets with restriction   in mitral leaflet excursion. Mean transmitral gradient is 15 mmHg; this   is   finding is consistent with severe mitral stenosis. Mild mitral   regurgitation noted. Impaired relaxation noted.   Moderate posterior mitral annular calcification noted.   The tricuspid valve leaflets are well seen and appear thin and pliable   with preserved leaflets excursion. Mild tricuspid regurgitation noted.   Moderate pulmonary hypertension is suggested.   Severe pulmonary hypertension.   The pulmonicvalve is not well seen. Trace to mild pulmonic regurgitation   noted.   Moderate to large circumferential pericardial effusion noted without echo   evidence for tamponade.    < end of copied text >

## 2019-07-07 NOTE — PROGRESS NOTE ADULT - ASSESSMENT
73 with htn, hld, asthma presents to the ED for shortness of breath and weakness noted with bradycardia, renal evaluation of ABIOLA.  ABIOLA likely multifactorial from cardiac isues/heart block/bradycardia, ARB playing a role as well as nsaid use. Could have a pre-renal state as well, baseline function of 1.3 mg/dl.    ABIOLA  -IVF hydration to keep net positive, can stop when NPO lifted if hemodynamically stable  -Non-oliguric with function trending towards baseline  -Formal renal songram results  -Maintain ARB on hold  -No acute need for HD    heart block  -CVS/telemetery  -sp PPM, window pending this afternoon    HTN  -ARB hold, bp stable  -IVF if needed for MAP    d/c with RN staff at bedside    7/6  ABIOLA improving  cont to hold arb  renal sono no acute path  on IVF    7/7  creat slowly improving  ESR CRP elevated  await pericardial bx report

## 2019-07-07 NOTE — PROGRESS NOTE ADULT - ASSESSMENT
73 year old woman with the above history admitted with symptomatic heart block but also with new onset acute renal insufficiency.  This may be related to ATN perhaps from hypotension related to her heart block and some degree of intravascular depletion along with her ARB therapy.  Will give IV hydration and hold hyzaar.  If creatinine improves by tomorrow would then proceed with PPM implantation for her heart block.  Check TTE    7/5/19:  Patient with moderate to large pericardial effusion.  Is to go for drainage today by Dr White.  Creatinine improving.  Mitral valve disease has worsened since her echo 18 months ago(which reveal a small to moderate pericardial effusion as well).  Continue IV hydration.  Blood work sent off for autoimmune work up.  Will revue TTE personally.  Also for PPM insertion.    7/6/19:  Patient appears stable post PPM insertion and pericardial window.  Awaiting results from pericardial biopsy/fluid analysis.  Also awaiting today's BMP results.    7/7/19:  Patient appears to be improving.  She is weak.  She does have high inflammatory markers.  ? underlying process as the cause of her pericardial effusion.  She is not hypothryoid.  Awaiting fluid anaysis.  Renal function improving.  Continue present management.

## 2019-07-07 NOTE — PROGRESS NOTE ADULT - SUBJECTIVE AND OBJECTIVE BOX
Patient is a 73y Female who reports no complaints, seen earlier. Note now. Seen post PPM, doing well with no breathing issues currently. Reports good uop "its more clear".     7/7  feels better sitting OOB   ambulating in room    7/6  s/p pericardial window  feels well  creat coming down nicely      REVIEW OF SYSTEMS:    CONSTITUTIONAL: stable weakness, fevers or chills  RESPIRATORY: No cough, wheezing, hemoptysis; No shortness of breath  CARDIOVASCULAR: No chest pain or palpitations  GENITOURINARY: No dysuria, frequency or hematuria  All other review of systems is negative unless indicated above.    MEDICATIONS  (STANDING):  atorvastatin 40 milliGRAM(s) Oral at bedtime  docusate sodium 100 milliGRAM(s) Oral three times a day  polyethylene glycol 3350 17 Gram(s) Oral daily    MEDICATIONS  (PRN):  ALBUTerol    90 MICROgram(s) HFA Inhaler 2 Puff(s) Inhalation every 6 hours PRN Bronchospasm  fentaNYL    Injectable 50 MICROGram(s) IV Push every 5 minutes PRN Severe Pain (7 - 10)  oxyCODONE    5 mG/acetaminophen 325 mG 1 Tablet(s) Oral every 4 hours PRN Mild Pain (1 - 3)  oxyCODONE    5 mG/acetaminophen 325 mG 2 Tablet(s) Oral every 6 hours PRN Moderate Pain (4 - 6)  senna 2 Tablet(s) Oral at bedtime PRN Constipation    I&O's Detail    06 Jul 2019 07:01  -  07 Jul 2019 07:00  --------------------------------------------------------  IN:    Oral Fluid: 600 mL    sodium chloride 0.9%: 500 mL  Total IN: 1100 mL    OUT:    Bulb: 60 mL    Voided: 1100 mL  Total OUT: 1160 mL    Total NET: -60 mL      07 Jul 2019 07:01  -  07 Jul 2019 17:59  --------------------------------------------------------  IN:    Oral Fluid: 360 mL  Total IN: 360 mL    OUT:    Bulb: 30 mL    Voided: 550 mL  Total OUT: 580 mL    Total NET: -220 mL    ICU Vital Signs Last 24 Hrs  T(C): 36.2 (07 Jul 2019 16:21), Max: 36.5 (07 Jul 2019 11:06)  T(F): 97.2 (07 Jul 2019 16:21), Max: 97.7 (07 Jul 2019 11:06)  HR: 60 (07 Jul 2019 17:00) (60 - 108)  BP: 122/72 (07 Jul 2019 17:00) (100/63 - 127/102)  BP(mean): 81 (07 Jul 2019 17:00) (63 - 107)  ABP: --  ABP(mean): --  RR: 16 (07 Jul 2019 17:00) (0 - 22)  SpO2: 100% (07 Jul 2019 17:00) (92% - 100%)        PHYSICAL EXAM:    Constitutional: NAD    HEENT: PERRLA, EOMI,  MMM  Neck: No LAD, No JVD  Respiratory: good aeration b/l  Cardiovascular: S1 and S2, RRR, Pericardial drain in place  Gastrointestinal: BS+, soft, NT/ND  Extremities: No peripheral edema  Neurological: A/O x 3, no focal deficits  : No Alfredo  Skin: No rashes  Access: Not applicable        LABS:               07-06    142  |  110<H>  |  68<H>  ----------------------------<  212<H>  4.5   |  24  |  3.07<H>    Ca    8.1<L>      06 Jul 2019 19:49                          10.1   11.95 )-----------( 181      ( 07 Jul 2019 06:15 )             30.8

## 2019-07-07 NOTE — PROGRESS NOTE ADULT - SUBJECTIVE AND OBJECTIVE BOX
CC/reason for follow up: pericardial eff    S: pain is controlled. sitting up in chair    ROS: no nausea. no dyspnea    T(C): 36.2 (07-07-19 @ 16:21), Max: 36.5 (07-07-19 @ 11:06)  HR: 79 (07-07-19 @ 18:00) (60 - 105)  BP: 112/73 (07-07-19 @ 18:00) (106/72 - 127/102)  RR: 19 (07-07-19 @ 18:00) (0 - 22)  SpO2: 100% (07-07-19 @ 17:00) (92% - 100%)    Gen - Pleasant, cooperative, in no acute distress  HEENT- PERRL, moist mucus membranes, OP clear  CV - RRR, No m/r/g, +pulses, no edema. drain intact w/ serosang fluid  Lungs - Good effort, Clear to auscultation bilaterally  Abdomen - Soft, nontender, nondistended, +BS, No rebound/rigidity/guarding  Ext - No cyanosis/clubbing.   Skin - No rashes, No jaundice  Psych- Alert & oriented x 3  Neuro- fluent speech, no facial droop, EOMI. moves all ext    ALBUTerol    90 MICROgram(s) HFA Inhaler 2 Puff(s) Inhalation every 6 hours PRN  atorvastatin 40 milliGRAM(s) Oral at bedtime  docusate sodium 100 milliGRAM(s) Oral three times a day  fentaNYL    Injectable 50 MICROGram(s) IV Push every 5 minutes PRN  oxyCODONE    5 mG/acetaminophen 325 mG 1 Tablet(s) Oral every 4 hours PRN  oxyCODONE    5 mG/acetaminophen 325 mG 2 Tablet(s) Oral every 6 hours PRN  senna 2 Tablet(s) Oral at bedtime PRN            Diagnostic studies: personally reviewed  LABS: All Labs Reviewed:    07-06    144  |  112<H>  |  67<H>  ----------------------------<  220<H>  4.6   |  20<L>  |  3.30<H>    Ca    8.6      06 Jul 2019 08:40  Phos  4.9     07-05    TPro  6.8  /  Alb  2.7<L>  /  TBili  0.4  /  DBili  x   /  AST  38<H>  /  ALT  53  /  AlkPhos  127<H>  07-05            Blood Culture: 07-05 @ 03:30  Organism --  Gram Stain Blood -- Gram Stain   polymorphonuclear leukocytes seen  No organisms seen  by cytocentrifuge  Specimen Source .Body Fluid PERICARDIAL FLUID FOR CULTURE  Culture-Blood --      RADIOLOGY/EKG:      < from: Xray Chest 1 View- PORTABLE-Urgent (07.05.19 @ 17:54) >  Chest:  one view.      Comparison: 07/05/2019    AP radiograph of the chest demonstrates small LEFT pleural effusion with   underlying atelectasis. Mild vascular congestion. Pacemaker. The cardiac   silhouette is normal in size. Osseous structures are intact.    Impression:small LEFT pleural effusion with underlying atelectasis. Mild   vascular congestion. Pacemaker.    < end of copied text >        Assessment and Plan:  73F.  admitted 07/04/2019.  presented to ED c/o dyspnea.  progressive in nature.  onset 2-3 days prior to admission.  incidently reports hx of recent insect bite ~ 2 weeks ago.  in ED, found to be in 3rd degree HB.  prophylactic does of doxycycline given.    PMHx:  moderate b/l carotid artery disease;  HTN;  HLD;  mild intermittent asthma    Assessment:  3rd degree HB.  moderate to large pericardial effusion.  ABIOLA.  elevated ESR/CRP  hx HTN.  hx asthma.    Plan:  -TTE LVEF 65-70%.  mod AI.  mod pHTN.  mod-large pericardial effusion-no tamponade.  -PPM placement 7/5  -pericardiocentesis, +/- pericardial window.    -TSH, rheumatologic and infectious serologies. culture NGTD  -renal US, no obstruction.  avoid NSAIDs and ARB.  IVFs (keep net positive to improve MAP).  -c/w chronic medical issue management.  -possible milan removal tomorrow   -DVT prophylaxis.  -discussed w/ RN    Code status: full  Dispo: inpatient  ELOS: > 2 nights    All questions/concerns were discussed with patient/family by bedside.    case d/w Dr. Saleem and Dr. Wilhelm    Total time spent: 30min. More than 50% of time was spent in counseling, discussion of medications, and coordination of care

## 2019-07-08 LAB
ACE SERPL-CCNC: 31 U/L — SIGNIFICANT CHANGE UP (ref 14–82)
ANION GAP SERPL CALC-SCNC: 5 MMOL/L — SIGNIFICANT CHANGE UP (ref 5–17)
BUN SERPL-MCNC: 56 MG/DL — HIGH (ref 7–23)
CALCIUM SERPL-MCNC: 8.5 MG/DL — SIGNIFICANT CHANGE UP (ref 8.5–10.1)
CHLORIDE SERPL-SCNC: 111 MMOL/L — HIGH (ref 96–108)
CO2 SERPL-SCNC: 28 MMOL/L — SIGNIFICANT CHANGE UP (ref 22–31)
CREAT SERPL-MCNC: 1.82 MG/DL — HIGH (ref 0.5–1.3)
ENA SCL70 AB SER-ACNC: <0.2 AI — SIGNIFICANT CHANGE UP
GLUCOSE SERPL-MCNC: 76 MG/DL — SIGNIFICANT CHANGE UP (ref 70–99)
POTASSIUM SERPL-MCNC: 4.2 MMOL/L — SIGNIFICANT CHANGE UP (ref 3.5–5.3)
POTASSIUM SERPL-SCNC: 4.2 MMOL/L — SIGNIFICANT CHANGE UP (ref 3.5–5.3)
SODIUM SERPL-SCNC: 144 MMOL/L — SIGNIFICANT CHANGE UP (ref 135–145)

## 2019-07-08 PROCEDURE — 99233 SBSQ HOSP IP/OBS HIGH 50: CPT | Mod: 24

## 2019-07-08 RX ORDER — ACETAMINOPHEN 500 MG
650 TABLET ORAL EVERY 6 HOURS
Refills: 0 | Status: DISCONTINUED | OUTPATIENT
Start: 2019-07-08 | End: 2019-07-09

## 2019-07-08 RX ADMIN — Medication 650 MILLIGRAM(S): at 13:09

## 2019-07-08 RX ADMIN — ATORVASTATIN CALCIUM 40 MILLIGRAM(S): 80 TABLET, FILM COATED ORAL at 21:39

## 2019-07-08 RX ADMIN — POLYETHYLENE GLYCOL 3350 17 GRAM(S): 17 POWDER, FOR SOLUTION ORAL at 12:32

## 2019-07-08 RX ADMIN — Medication 650 MILLIGRAM(S): at 21:39

## 2019-07-08 RX ADMIN — Medication 650 MILLIGRAM(S): at 14:00

## 2019-07-08 NOTE — PROGRESS NOTE ADULT - ASSESSMENT
73 year old woman with the above history admitted with symptomatic heart block but also with new onset acute renal insufficiency.  This may be related to ATN perhaps from hypotension related to her heart block and some degree of intravascular depletion along with her ARB therapy.  Will give IV hydration and hold hyzaar.  If creatinine improves by tomorrow would then proceed with PPM implantation for her heart block.  Check TTE    7/5/19:  Patient with moderate to large pericardial effusion.  Is to go for drainage today by Dr White.  Creatinine improving.  Mitral valve disease has worsened since her echo 18 months ago(which reveal a small to moderate pericardial effusion as well).  Continue IV hydration.  Blood work sent off for autoimmune work up.  Will revue TTE personally.  Also for PPM insertion.    7/6/19:  Patient appears stable post PPM insertion and pericardial window.  Awaiting results from pericardial biopsy/fluid analysis.  Also awaiting today's BMP results.    7/7/19:  Patient appears to be improving.  She is weak.  She does have high inflammatory markers.  ? underlying process as the cause of her pericardial effusion.  She is not hypothryoid.  Awaiting fluid anaysis.  Renal function improving.  Continue present management.    7/8/19:  Patient improving.  Creatinine almost fully normalized.  Hopefully take drain out today.  Increase ambulation.  Discharge soon.

## 2019-07-08 NOTE — PROGRESS NOTE ADULT - SUBJECTIVE AND OBJECTIVE BOX
CC/reason for follow up: pericardial eff    S: sitting up in the chair. had BM and abd feels better. c/o pain at drain site with inhalation or when coughing. awaiting drain removal today    ROS: no nausea. no dyspnea    T(C): 36.6 (07-08-19 @ 14:44), Max: 36.6 (07-08-19 @ 14:44)  HR: 65 (07-08-19 @ 15:00) (60 - 105)  BP: 101/81 (07-08-19 @ 12:00) (90/66 - 184/70)  RR: 18 (07-08-19 @ 15:00) (12 - 29)  SpO2: 95% (07-08-19 @ 15:00) (84% - 100%)    Gen - Pleasant, cooperative, in no acute distress  HEENT- PERRL, moist mucus membranes, OP clear  CV - RRR, No m/r/g, +pulses, no edema. drain intact w/ serosang fluid  Lungs - Good effort, Clear to auscultation bilaterally  Abdomen - Soft, nontender, nondistended, +BS, No rebound/rigidity/guarding. drain intact  Ext - No cyanosis/clubbing.   Skin - No rashes, No jaundice  Psych- Alert & oriented x 3  Neuro- fluent speech, no facial droop, EOMI. moves all ext    MEDICATIONS  (STANDING):  atorvastatin 40 milliGRAM(s) Oral at bedtime  docusate sodium 100 milliGRAM(s) Oral three times a day  polyethylene glycol 3350 17 Gram(s) Oral daily    MEDICATIONS  (PRN):  acetaminophen   Tablet .. 650 milliGRAM(s) Oral every 6 hours PRN Temp greater or equal to 38C (100.4F), Mild Pain (1 - 3)  ALBUTerol    90 MICROgram(s) HFA Inhaler 2 Puff(s) Inhalation every 6 hours PRN Bronchospasm  fentaNYL    Injectable 50 MICROGram(s) IV Push every 5 minutes PRN Severe Pain (7 - 10)  oxyCODONE    5 mG/acetaminophen 325 mG 1 Tablet(s) Oral every 4 hours PRN Mild Pain (1 - 3)  oxyCODONE    5 mG/acetaminophen 325 mG 2 Tablet(s) Oral every 6 hours PRN Moderate Pain (4 - 6)  senna 2 Tablet(s) Oral at bedtime PRN Constipation      Diagnostic studies: personally reviewed  LABS: All Labs Reviewed:                        10.1   11.95 )-----------( 181      ( 07 Jul 2019 06:15 )             30.8     07-08    144  |  111<H>  |  56<H>  ----------------------------<  76  4.2   |  28  |  1.82<H>    Ca    8.5      08 Jul 2019 06:42      Blood Culture: 07-05 @ 03:30  Organism --  Gram Stain Blood -- Gram Stain   polymorphonuclear leukocytes seen  No organisms seen  by cytocentrifuge  Specimen Source .Body Fluid PERICARDIAL FLUID FOR CULTURE  Culture-Blood --    RADIOLOGY/EKG:      < from: Xray Chest 1 View- PORTABLE-Urgent (07.05.19 @ 17:54) >  Chest:  one view.      Comparison: 07/05/2019    AP radiograph of the chest demonstrates small LEFT pleural effusion with   underlying atelectasis. Mild vascular congestion. Pacemaker. The cardiac   silhouette is normal in size. Osseous structures are intact.    Impression:small LEFT pleural effusion with underlying atelectasis. Mild   vascular congestion. Pacemaker.    < end of copied text >        Assessment and Plan:  73F.  admitted 07/04/2019.  presented to ED c/o dyspnea.  progressive in nature.  onset 2-3 days prior to admission.  incidently reports hx of recent insect bite ~ 2 weeks ago.  in ED, found to be in 3rd degree HB.  prophylactic does of doxycycline given.    PMHx:  moderate b/l carotid artery disease;  HTN;  HLD;  mild intermittent asthma    Assessment:  1.	3rd degree HB.  2.	moderate to large pericardial effusion.  3.	ABIOLA.  4.	elevated ESR/CRP  5.	hx HTN.  6.	hx asthma.  7.	abnormal TFT's, sick euthyroid syndrome    Plan:  -TTE LVEF 65-70%.  mod AI.  mod pHTN.  mod-large pericardial effusion-no tamponade.  -PPM placement 7/5  -s/p pericardial window and biopsy. pathology pending  -TSH, rheumatologic and infectious serologies. culture NGTD  -renal US, no obstruction.  avoid NSAIDs and ARB.  IVFs (keep net positive to improve MAP). creat improving  -c/w chronic medical issue management.  -drian removal 7/8 per CT surgery  -will need repeat TFT's in 4-6 weeks as OP  -DVT prophylaxis.  -discussed w/ RN  -transfer to med/surg if ok w/ cards/surgery  -discharge planning    Code status: full  Dispo: inpatient  ELOS: possible d/c tomorrow if ok w/ consultants.     All questions/concerns were discussed with patient/family by bedside.    case d/w Dr. Gallardo    Total time spent: 30min. More than 50% of time was spent in counseling, discussion of medications, and coordination of care

## 2019-07-08 NOTE — PROGRESS NOTE ADULT - SUBJECTIVE AND OBJECTIVE BOX
Subjective:  Patient has no complaints.  Breathing has improved.    Vital Signs:  Vital Signs Last 24 Hrs  T(F): 97.3  HR: 72   BP: 101/81   RR: 16   SpO2: 95% on room air    Telemetry/Alarms: intermittent paced rhythm    Relevant labs, radiology and Medications reviewed                        10.1 11.95 )-----------( 181      ( 07 Jul 2019 06:15 )             30.8     07-08    144  |  111<H>  |  56<H>  ----------------------------<  76  4.2   |  28  |  1.82<H>    Ca    8.5      08 Jul 2019 06:42        MEDICATIONS  (STANDING):  atorvastatin 40 milliGRAM(s) Oral at bedtime  docusate sodium 100 milliGRAM(s) Oral three times a day  polyethylene glycol 3350 17 Gram(s) Oral daily    MEDICATIONS  (PRN):  acetaminophen   Tablet .. 650 milliGRAM(s) Oral every 6 hours PRN Temp greater or equal to 38C (100.4F), Mild Pain (1 - 3)  ALBUTerol    90 MICROgram(s) HFA Inhaler 2 Puff(s) Inhalation every 6 hours PRN Bronchospasm  fentaNYL    Injectable 50 MICROGram(s) IV Push every 5 minutes PRN Severe Pain (7 - 10)  oxyCODONE    5 mG/acetaminophen 325 mG 1 Tablet(s) Oral every 4 hours PRN Mild Pain (1 - 3)  oxyCODONE    5 mG/acetaminophen 325 mG 2 Tablet(s) Oral every 6 hours PRN Moderate Pain (4 - 6)  senna 2 Tablet(s) Oral at bedtime PRN Constipation      Physical exam  Gen: NAD breathing comfortably  Neuro: AO x 3  Card: S1 S2  Pulm: CTA  Abd: soft NT ND  Ext: No edema    Tubes: pericardial rocky has minimal serous drainage, removed at bedside, patient tolerated it well.    I&O's Summary    07 Jul 2019 07:01  -  08 Jul 2019 07:00  --------------------------------------------------------  IN: 360 mL / OUT: 580 mL / NET: -220 mL    08 Jul 2019 07:01  -  08 Jul 2019 13:00  --------------------------------------------------------  IN: 240 mL / OUT: 30 mL / NET: 210 mL        Assessment  73y Female  w/ PAST MEDICAL & SURGICAL HISTORY:  Right knee pain, unspecified chronicity  High cholesterol  Mild intermittent asthma, unspecified whether complicated  History of lumpectomy: left and right breast  History of total left hip replacement  Hypertension, unspecified type  admitted with complaints of weakness and shortness of breath pericardial effusion (07 Jul 2019 07:33)  .   patient underwent Pericardial biopsy  Creation, pericardial window, subxiphoid approach  .     PLAN    Pain control.  Follow up cytology and pericardial cultures.  Other care as per medicine.    Discussed with Cardiothoracic Team at AM rounds.

## 2019-07-08 NOTE — PHYSICAL THERAPY INITIAL EVALUATION ADULT - MARITAL STATUS
cody Ferris is a physician at [facility]/ /son Barrington is a physician at [facility]; 16 yr old grandson is overseeing coming/going of contractors doing kitchen renovation while pt hospitalized

## 2019-07-08 NOTE — PHYSICAL THERAPY INITIAL EVALUATION ADULT - PERTINENT HX OF CURRENT PROBLEM, REHAB EVAL
shortness of breath,+KAUFFMAN,walking to mailbox needs to stop and catch her breath,h/o tick bite 1 month ago rec'd Doxycycline shortness of breath, +KAUFFMAN, walking to mailbox needs to stop and catch her breath, h/o tick bite 2 weeks ago rec'd Doxycycline

## 2019-07-08 NOTE — PROVIDER CONTACT NOTE (OTHER) - REASON
Call Back
Call Back
Chaplaincy Care Consult
Consult
confirm approval for downgrade to med-surg
Consult

## 2019-07-08 NOTE — PHYSICAL THERAPY INITIAL EVALUATION ADULT - CRITERIA FOR SKILLED THERAPEUTIC INTERVENTIONS
therapy frequency/anticipated equipment needs at discharge/anticipated discharge recommendation/impairments found/predicted duration of therapy intervention/rehab potential/risk reduction/prevention/functional limitations in following categories

## 2019-07-08 NOTE — PHYSICAL THERAPY INITIAL EVALUATION ADULT - PLANNED THERAPY INTERVENTIONS, PT EVAL
gait training/ADLs, L shoulder precautions while PPM site healing/transfer training/bed mobility training

## 2019-07-08 NOTE — PROGRESS NOTE ADULT - SUBJECTIVE AND OBJECTIVE BOX
· Chief Complaint: The patient is a 73y Female complaining of shortness of breath.	  · HPI Objective Statement: 73F hx htn hld asthma presents to the ED for shortness of breath and weakness. Pt states that shortness of breath is present with exertion. states that when she walks to the mailbox she has to stop to catch her breath. mild cough. no sob when laying flat. no cp. no had or dizziness. no diaphoresis. states that she is having work done in her kitchen and there is dust. also states that 1 month ago she was bit by a tick but received doxycycline 1 dose.	    PAST MEDICAL/SURGICAL/FAMILY/SOCIAL HISTORY:     Hypertension  Asthma  Dyslipidemia  Moderate bilateral carotid disease   Alcohol Use History:  · Have you ever consumed alcohol	never	     Tobacco Usage:  · Tobacco Usage	Never smoker	    ALLERGIES AND HOME MEDICATIONS:   Allergies:        Allergies:  	No Known Allergies:     Home Medications:   * Outpatient Medication Status not yet specified    REVIEW OF SYSTEMS:    Review of Systems:  · Review of Systems: Constitutional: No fever or chills  Eyes: No visual changes  HEENT: No throat pain  CV: No chest pain  Resp: + SOB + cough  GI: No abd pain, nausea or vomiting  : No dysuria  MSK: No musculoskeletal pain  Skin: No rash Neuro: No headache	    PHYSICAL EXAM:   · Physical Examination: Constitutional: NAD AAOx3  Eyes: PERRLA EOMI  Head: Normocephalic atraumatic  Mouth: MMM  Cardiac: bradycardic no LE swelling normal peripheral pulses  Resp: Lungs CTAB  GI: Abd s/nt/nd  Neuro: CN2-12 intact Skin: No rashes	    CURRENT ORDERS/:   · 	Activity - Bedrest, 04-Jul-2019, Active, Standard  · 	Cardiac Monitor Bedside,   Time/Priority:  STAT, 04-Jul-2019, Active, Standard  · 	IV Insert,   Time/Priority:  STAT, 04-Jul-2019, Active, Standard  · 	Pulse Oximetry,   Frequency: <Continuous>  	  Additional Instructions:  Notify Provider if oxygen saturation is LESS THAN 92%, 04-Jul-2019, Active, Standard  · 	Vital Signs,   Frequency:  per routine, 04-Jul-2019, Active, Standard    RESULTS:   · EKG Date/Time: 04-Jul-2019 08:25	  · Rate: 42	  · Interpretation: abnormal	  · Abnormal Rhythm: bradycardia	  · Acute or Evolving MI?: no	  · Axis: Right Deviation	  · AL: 302	  · QRS: 88	  · ST/T Wave: no st elevations	  · Other Findings: 1st degree av block	  · EKG Date/Time: 04-Jul-2019 08:58	  · Rate: 39	  · Interpretation: abnormal	  · Abnormal Rhythm: 3rd degree heart block	  · Acute or Evolving MI?: no	  · Axis: Normal	  · AL: 98	  · QRS: 88	  · ST/T Wave: no st elevations	    PROGRESS NOTE:   Date: 04-Jul-2019 08:59.     Progress: repeat EKG - pt in 3rd degree heart block - pads placed on patient. bp stable. cardiology paged. Arya López M.D., Attending Physician.     Date: 04-Jul-2019 09:07.     Progress: spoke with Dr. Wilhelm will see patient shortly. Arya López M.D., Attending Physician.     Date: 04-Jul-2019 09:14.     Progress: pt endorsed to DR. Pike for 3rd degree block pt to go to CCU. Arya López M.D., Attending Physician.     Date: 04-Jul-2019 09:20.     Progress: spoke with EP will see patient shortly. Arya López M.D., Attending Physician.    DISPOSITION:    Care Plan - Instructions:  Principal Discharge DX:	Exertional dyspnea  Secondary Diagnosis:	Heart block AV third degree.     Impression:  Principal Discharge Dx Exertional dyspnea.     Secondary Discharge Dx Heart block AV third degree.     Medical Decision Making:  · Physician E/M Selection	[id number] Critical Care - 30 to 74 minutes	  · The following orders were submitted:	Labs, EKG, Imaging Studies, Medications	  · EKG - Performed independent visualization (See EKG Result section above)	Yes	  · Critical Care Indication	patient was critically ill...	  · Critical Care Indication	Patient was critically ill with a high probability of imminent or life threatening deterioration.	  · Critical Care Provided	direct patient care (not related to procedure), additional history taking, interpretation of diagnostic studies, documentation, consultation with other physicians	  · Critical Care Attestation	I have personally provided the amount of critical care time documented below excluding time spent on separate procedures	  · Crtical Care Time Spent (min) Must be 30 or more minutes to qualify:	30	  · Clinical Summary  (MDM): Summarize the clinical encounter	73F hx htn hld asthma presents to the ED for shortness of breath and weakness. Pt states that shortness of breath is present with exertion. states that when she walks to the mailbox she has to stop to catch her breath. mild cough. no sob when laying flat. no cp. no had or dizziness. no diaphoresis. states that she is having work done in her kitchen and there is dust. also states that 1 month ago she was bit by a tick but received doxycycline 1 dose. exam with bradycardic no LE swelling normal peripheral pulses. son at bedside is a physician at . spoke with patient and son regarding care - given new bradycardia concern for cardiac etiology. 1st degree block - will send lyme and silvia, touch base with cardiologist. lower suspicion asthma as normal breath sounds no wheezing not tight. will obtain xray cardiac monitor and reassess. Arya López M.D., Attending Physician	        Accepting Physician:   Provider Role	Provider Name	  · Accepting Physician	Froilan Pike	    Prescriptions:   * Outpatient Medication Status not yet specified    ATTESTATION STATEMENT:    Attestations Statements:  Attending Statement: Attending Only.    PROVIDER CARE INITIATION:   · Care Initiated by:	Arya López(Attending)	  · Provider Care Initiated at:	04-Jul-2019 08:01	      Electronic Signatures:  Arya López)  (Signed 04-Jul-2019 09:21)  	Authored: HISTORY OF PRESENT ILLNESS, PAST MEDICAL/SURGICAL/FAMILY/SOCIAL HISTORY, ALLERGIES AND HOME MEDICATIONS, REVIEW OF SYSTEMS, PHYSICAL EXAM, CURRENT ORDERS/, RESULTS, PROGRESS NOTE, DISPOSITION, ATTESTATION STATEMENT, PROVIDER CARE INITIATION      Last Updated: 04-Jul-2019 09:21 by Arya López)    References:  1.  Data Referenced From "ED ADULT Triage Note" 7/4/2019 7:55 AM      7/6/19:  Patient had PPM and a pericardial window yesterday.    7/7/19:  Patient had a CXR yesterday and it showed some CHF and her IV fluids were stopped            REVIEW OF SYSTEMS:    As above  No chest pain + shortness of breath  No lightheadeness or syncope  No leg swelling  No palpitations  No claudication-like symptoms    ICU Vital Signs Last 24 Hrs  T(C): 36 (07 Jul 2019 04:00), Max: 36.5 (06 Jul 2019 11:50)  T(F): 96.8 (07 Jul 2019 04:00), Max: 97.7 (06 Jul 2019 11:50)  HR: 75 (07 Jul 2019 09:12) (60 - 108)  BP: 117/46 (07 Jul 2019 09:12) (99/77 - 136/58)  BP(mean): 63 (07 Jul 2019 09:12) (57 - 108)  ABP: 124/56 (06 Jul 2019 14:00) (112/48 - 124/56)  ABP(mean): 80 (06 Jul 2019 14:00) (71 - 80)  RR: 18 (07 Jul 2019 09:12) (0 - 22)  SpO2: 99% (07 Jul 2019 08:00) (95% - 100%)        I&O's Summary      CAPILLARY BLOOD GLUCOSE          PHYSICAL EXAM:   Patient in NAD  Neck: No JVD; Carotids:  2+ without bruits  Respiratory:  Clear to A&P  Cardiovascular: S1 and S2, regular rate + MICHELLE  Gastrointestinal:  Soft, non-tender; BS positive  Extremities: No peripheral edema  Vascular: 2+ peripheral pulses  Neurological: A/O x 3, no focal deficits      MEDICATIONS  (STANDING):  atorvastatin 40 milliGRAM(s) Oral at bedtime  docusate sodium 100 milliGRAM(s) Oral three times a day        LABS: All Labs Reviewed:  Blood Culture:   BNP Serum Pro-Brain Natriuretic Peptide: 8937 pg/mL (07-04 @ 08:30)    CBC             WBC Count: 8.19 K/uL (07-04 @ 08:30)              Hemoglobin: 11.5 g/dL (07-04 @ 08:30)              Hematocrit: 35.8 % (07-04 @ 08:30)              Mean Cell Volume: 90.4 fl (07-04 @ 08:30)              Platelet Count - Automated: 199 K/uL (07-04 @ 08:30)                        10.1   11.95 )-----------( 181      ( 07 Jul 2019 06:15 )             30.8                                          Cardiac markers             Troponin I, Serum: <0.015 ng/mL (07-04 @ 08:30)                             [place]ms        Sodium, Serum: 140 mmol/L (07-04 @ 08:30)          Potassium, Serum: 4.4 mmol/L (07-04 @ 08:30)          Blood Urea Nitrogen, Serum: 81 mg/dL (07-04 @ 08:30)          Creatinine 5.51          Magnesium, Serum: 2.4 mg/dL (07-04 @ 08:30)          Protein Total, Serum: 7.5 gm/dL (07-04 @ 08:30)                  Calcium, Total Serum: 9.1 mg/dL (07-04 @ 08:30)                  Bilirubin Total, Serum: 0.5 mg/dL (07-04 @ 08:30)          Alanine Aminotransferase (ALT/SGPT): 56 U/L (07-04 @ 08:30)          Aspartate Aminotransferase (AST/SGOT): 47 U/L (07-04 @ 08:30)    07-05    142  |  111<H>  |  73<H>  ----------------------------<  96  4.2   |  21<L>  |  4.39<H>    Ca    8.7      05 Jul 2019 06:16  Mg     2.4     07-04    TPro  6.8  /  Alb  2.7<L>  /  TBili  0.4  /  DBili  x   /  AST  38<H>  /  ALT  53  /  AlkPhos  127<H>  07-05 07-06    142  |  110<H>  |  68<H>  ----------------------------<  212<H>  4.5   |  24  |  3.07<H>    Ca    8.1<L>      06 Jul 2019 19:49    07-08    144  |  111<H>  |  56<H>  ----------------------------<  76  4.2   |  28  |  1.82<H>    Ca    8.5      08 Jul 2019 06:42    Serologies all negative                       INR: 1.11 ratio (07-04 @ 08:30)             Sedimentation Rate, Erythrocyte: 97 mm/hr (07.06.19 @ 19:49)  C-Reactive Protein, Serum (07.06.19 @ 19:49)    C-Reactive Protein, Serum: 14.49 mg/dL        RADIOLOGY:    EKG:  Sinus rhythm; 2:1 AV block; old ASWMI(unchanged)    Telemetry:   second degree heart block    ECHO:  < from: Transthoracic Echocardiogram (07.04.19 @ 11:38) >   The left ventricle is normal in size. Estimated left ventricular ejection   fraction is 65-70 %.   The left atrium is moderately dilated.   Significant fibrocalcific changes noted to the aortic valve leaflets with   restriction in leaflet excursion. Peak transaortic gradient is 26mmHg;   this finding is consistent with mild aortic stenosis.   Moderate (2+) aortic regurgitation is present.   Fibrocalcific changes noted to the mitral valve leaflets with restriction   in mitral leaflet excursion. Mean transmitral gradient is 15 mmHg; this   is   finding is consistent with severe mitral stenosis. Mild mitral   regurgitation noted. Impaired relaxation noted.   Moderate posterior mitral annular calcification noted.   The tricuspid valve leaflets are well seen and appear thin and pliable   with preserved leaflets excursion. Mild tricuspid regurgitation noted.   Moderate pulmonary hypertension is suggested.   Severe pulmonary hypertension.   The pulmonicvalve is not well seen. Trace to mild pulmonic regurgitation   noted.   Moderate to large circumferential pericardial effusion noted without echo   evidence for tamponade.    < end of copied text >

## 2019-07-08 NOTE — PHYSICAL THERAPY INITIAL EVALUATION ADULT - ACTIVE RANGE OF MOTION EXAMINATION, REHAB EVAL
L shoulder tested below 90 degrees observing L shoulder precautions post -PPM placement/bilateral  lower extremity Active ROM was WFL (within functional limits)/bilateral upper extremity Active ROM was WFL (within functional limits)

## 2019-07-08 NOTE — PHYSICAL THERAPY INITIAL EVALUATION ADULT - GENERAL OBSERVATIONS, REHAB EVAL
sitting up in bedside chair awake,alert,well groomed .Ox3,PPM site L CW with transparent dressing,local bruising,c/o discomfort at subxiphoid region alex.with coughing and requests Tylenol

## 2019-07-08 NOTE — PROGRESS NOTE ADULT - ASSESSMENT
73 with htn, hld, asthma presents to the ED for shortness of breath and weakness noted with bradycardia, renal evaluation of ABIOLA.  ABIOLA likely multifactorial from cardiac isues/heart block/bradycardia, ARB playing a role as well as nsaid use. Could have a pre-renal state as well, baseline function of 1.3 mg/dl.    ABIOLA  -Non-oliguric with function trending towards baseline  -Optimize oral intake    heart block  -CVS/telemetery  -sp PPM, window     HTN  -ARB held, bp stable. Can resume arb if needed as renals are stabilizing      d/c with RN staff at bedside

## 2019-07-08 NOTE — PHYSICAL THERAPY INITIAL EVALUATION ADULT - PRECAUTIONS/LIMITATIONS, REHAB EVAL
s/p pericardial window via subxiphoid approach,h/o L THR,R knee pain/left hip precautions/surgical precautions L shoulder precautions s/p PPM placement ; s/p pericardial window via subxiphoid approach,h/o L THR,R knee pain/surgical precautions/left hip precautions

## 2019-07-08 NOTE — PROVIDER CONTACT NOTE (OTHER) - ACTION/TREATMENT ORDERED:
Spoke with Jose E at service for consult for Dr. White as ordered by Dr. Pike.
Dr. Wen called for consult.  Spoke with Clement at service.  Per Clement Gallardo is on call.  Message left for Dr. Gallardo.
Spoke with bridgett Muñoz to downgrade pt to med-surg.

## 2019-07-08 NOTE — PHYSICAL THERAPY INITIAL EVALUATION ADULT - DIAGNOSIS, PT EVAL
bradycardia (HR=39-42) +3rd degree heart block,s/p +PPM implantation & pericardial window,ABIOLA bradycardia (HR=39-42) +3rd degree heart block,  s/p +PPM implantation, large pericardial effusion s/p  pericardial window & biopsy 7/5/19 ABIOLA

## 2019-07-08 NOTE — PROGRESS NOTE ADULT - SUBJECTIVE AND OBJECTIVE BOX
Patient is a 73y Female who reports no complaints overnight. She reports drains removed    REVIEW OF SYSTEMS:    CONSTITUTIONAL: stable weakness, no fevers or chills  RESPIRATORY: No cough, wheezing, hemoptysis; stable shortness of breath  CARDIOVASCULAR: No chest pain or palpitations  GENITOURINARY: No dysuria, frequency or hematuria  All other review of systems is negative unless indicated above.    MEDICATIONS  (STANDING):  atorvastatin 40 milliGRAM(s) Oral at bedtime  docusate sodium 100 milliGRAM(s) Oral three times a day  polyethylene glycol 3350 17 Gram(s) Oral daily    MEDICATIONS  (PRN):  acetaminophen   Tablet .. 650 milliGRAM(s) Oral every 6 hours PRN Temp greater or equal to 38C (100.4F), Mild Pain (1 - 3)  ALBUTerol    90 MICROgram(s) HFA Inhaler 2 Puff(s) Inhalation every 6 hours PRN Bronchospasm  fentaNYL    Injectable 50 MICROGram(s) IV Push every 5 minutes PRN Severe Pain (7 - 10)  oxyCODONE    5 mG/acetaminophen 325 mG 1 Tablet(s) Oral every 4 hours PRN Mild Pain (1 - 3)  oxyCODONE    5 mG/acetaminophen 325 mG 2 Tablet(s) Oral every 6 hours PRN Moderate Pain (4 - 6)  senna 2 Tablet(s) Oral at bedtime PRN Constipation        T(C): , Max: 36.6 (07-08-19 @ 14:44)  T(F): , Max: 97.9 (07-08-19 @ 14:44)  HR: 80 (07-08-19 @ 15:52)  BP: 138/85 (07-08-19 @ 15:52)  BP(mean): 87 (07-08-19 @ 12:00)  RR: 18 (07-08-19 @ 15:00)  SpO2: 100% (07-08-19 @ 15:52)  Wt(kg): --    07-07 @ 07:01  -  07-08 @ 07:00  --------------------------------------------------------  IN: 360 mL / OUT: 580 mL / NET: -220 mL    07-08 @ 07:01 - 07-08 @ 17:29  --------------------------------------------------------  IN: 720 mL / OUT: 30 mL / NET: 690 mL      PHYSICAL EXAM:    Constitutional: NAD, well-groomed, well-developed  HEENT: PERRLA, EOMI,  MMM  Neck: No LAD, No JVD  Respiratory: CTAB  Cardiovascular: L chest, clear  Gastrointestinal: BS+, soft, NT/ND  Extremities: No peripheral edema  Neurological: A/O x 3, no focal deficits  Psychiatric: Normal mood, normal affect  : No Alfredo  Skin: No rashes  Access: Not applicable        LABS:                        10.1   11.95 )-----------( 181      ( 07 Jul 2019 06:15 )             30.8     08 Jul 2019 06:42    144    |  111    |  56     ----------------------------<  76     4.2     |  28     |  1.82   06 Jul 2019 19:49    142    |  110    |  68     ----------------------------<  212    4.5     |  24     |  3.07   06 Jul 2019 08:40    144    |  112    |  67     ----------------------------<  220    4.6     |  20     |  3.30   05 Jul 2019 06:16    142    |  111    |  73     ----------------------------<  96     4.2     |  21     |  4.39     Ca    8.5        08 Jul 2019 06:42  Ca    8.1        06 Jul 2019 19:49  Ca    8.6        06 Jul 2019 08:40  Ca    8.7        05 Jul 2019 06:16  Phos  4.9       05 Jul 2019 06:16    TPro  6.8    /  Alb  2.7    /  TBili  0.4    /  DBili  x      /  AST  38     /  ALT  53     /  AlkPhos  127    05 Jul 2019 06:16          Urine Studies:          RADIOLOGY & ADDITIONAL STUDIES:

## 2019-07-09 ENCOUNTER — TRANSCRIPTION ENCOUNTER (OUTPATIENT)
Age: 74
End: 2019-07-09

## 2019-07-09 VITALS
OXYGEN SATURATION: 99 % | DIASTOLIC BLOOD PRESSURE: 76 MMHG | HEART RATE: 74 BPM | SYSTOLIC BLOOD PRESSURE: 151 MMHG | RESPIRATION RATE: 16 BRPM | TEMPERATURE: 99 F

## 2019-07-09 LAB
ANA TITR SER: NEGATIVE — SIGNIFICANT CHANGE UP
ANION GAP SERPL CALC-SCNC: 4 MMOL/L — LOW (ref 5–17)
BUN SERPL-MCNC: 37 MG/DL — HIGH (ref 7–23)
CALCIUM SERPL-MCNC: 8.6 MG/DL — SIGNIFICANT CHANGE UP (ref 8.5–10.1)
CHLORIDE SERPL-SCNC: 111 MMOL/L — HIGH (ref 96–108)
CO2 SERPL-SCNC: 27 MMOL/L — SIGNIFICANT CHANGE UP (ref 22–31)
CREAT SERPL-MCNC: 1.4 MG/DL — HIGH (ref 0.5–1.3)
GLUCOSE SERPL-MCNC: 140 MG/DL — HIGH (ref 70–99)
HCT VFR BLD CALC: 36.7 % — SIGNIFICANT CHANGE UP (ref 34.5–45)
HGB BLD-MCNC: 11.7 G/DL — SIGNIFICANT CHANGE UP (ref 11.5–15.5)
MCHC RBC-ENTMCNC: 29 PG — SIGNIFICANT CHANGE UP (ref 27–34)
MCHC RBC-ENTMCNC: 31.9 GM/DL — LOW (ref 32–36)
MCV RBC AUTO: 90.8 FL — SIGNIFICANT CHANGE UP (ref 80–100)
PLATELET # BLD AUTO: 197 K/UL — SIGNIFICANT CHANGE UP (ref 150–400)
POTASSIUM SERPL-MCNC: 4.2 MMOL/L — SIGNIFICANT CHANGE UP (ref 3.5–5.3)
POTASSIUM SERPL-SCNC: 4.2 MMOL/L — SIGNIFICANT CHANGE UP (ref 3.5–5.3)
RBC # BLD: 4.04 M/UL — SIGNIFICANT CHANGE UP (ref 3.8–5.2)
RBC # FLD: 12.3 % — SIGNIFICANT CHANGE UP (ref 10.3–14.5)
SODIUM SERPL-SCNC: 142 MMOL/L — SIGNIFICANT CHANGE UP (ref 135–145)
SURGICAL PATHOLOGY STUDY: SIGNIFICANT CHANGE UP
WBC # BLD: 6.9 K/UL — SIGNIFICANT CHANGE UP (ref 3.8–10.5)
WBC # FLD AUTO: 6.9 K/UL — SIGNIFICANT CHANGE UP (ref 3.8–10.5)

## 2019-07-09 PROCEDURE — 99233 SBSQ HOSP IP/OBS HIGH 50: CPT | Mod: 24

## 2019-07-09 RX ORDER — ATORVASTATIN CALCIUM 80 MG/1
1 TABLET, FILM COATED ORAL
Qty: 30 | Refills: 0
Start: 2019-07-09

## 2019-07-09 RX ORDER — POLYETHYLENE GLYCOL 3350 17 G/17G
17 POWDER, FOR SOLUTION ORAL
Qty: 0 | Refills: 0 | DISCHARGE
Start: 2019-07-09

## 2019-07-09 RX ADMIN — POLYETHYLENE GLYCOL 3350 17 GRAM(S): 17 POWDER, FOR SOLUTION ORAL at 12:37

## 2019-07-09 NOTE — PROGRESS NOTE ADULT - ASSESSMENT
73 with htn, hld, asthma presents to the ED for shortness of breath and weakness noted with bradycardia, renal evaluation of ABIOLA.  ABIOLA likely multifactorial from cardiac isues/heart block/bradycardia, ARB playing a role as well as nsaid use. Could have a pre-renal state as well, baseline function of 1.3 mg/dl.    ABIOLA/CKD III?  -Non-oliguric with function trending towards baseline, last available 1.3 mg/dl  -Optimize oral intake, nsaid avoidance stressed  -No renal issue with resuming arb if needed for bp  -Check labs within 1 week of discharge  -No renal issue with dc planning    heart block  -CVS follow up  -sp PPM, window     HTN  -ARB held, bp stable. Can resume arb if needed as renals are stabilizing      d/c with RN staff, son via phone and with Dr. Pearl

## 2019-07-09 NOTE — DISCHARGE NOTE PROVIDER - NSDCCPCAREPLAN_GEN_ALL_CORE_FT
PRINCIPAL DISCHARGE DIAGNOSIS  Diagnosis: Heart block AV third degree  Assessment and Plan of Treatment:       SECONDARY DISCHARGE DIAGNOSES  Diagnosis: Acute renal failure  Assessment and Plan of Treatment:     Diagnosis: Pericardial effusion  Assessment and Plan of Treatment: Pericardial effusion    Diagnosis: Exertional dyspnea  Assessment and Plan of Treatment: Exertional dyspnea

## 2019-07-09 NOTE — PROGRESS NOTE ADULT - SUBJECTIVE AND OBJECTIVE BOX
Subjective:  Patient has no new complaints.  Feeling well.    Vital Signs:  Vital Signs Last 24 Hrs  T(F): 98.6   HR: 72  BP: 131/61  RR: 18  SpO2: 100% on room air      Relevant labs, radiology and Medications reviewed                        11.7   6.90  )-----------( 197      ( 09 Jul 2019 08:22 )             36.7     07-09    142  |  111<H>  |  37<H>  ----------------------------<  140<H>  4.2   |  27  |  1.40<H>    Ca    8.6      09 Jul 2019 08:22        MEDICATIONS  (STANDING):  atorvastatin 40 milliGRAM(s) Oral at bedtime  docusate sodium 100 milliGRAM(s) Oral three times a day  polyethylene glycol 3350 17 Gram(s) Oral daily    MEDICATIONS  (PRN):  acetaminophen   Tablet .. 650 milliGRAM(s) Oral every 6 hours PRN Temp greater or equal to 38C (100.4F), Mild Pain (1 - 3)  ALBUTerol    90 MICROgram(s) HFA Inhaler 2 Puff(s) Inhalation every 6 hours PRN Bronchospasm  fentaNYL    Injectable 50 MICROGram(s) IV Push every 5 minutes PRN Severe Pain (7 - 10)  oxyCODONE    5 mG/acetaminophen 325 mG 1 Tablet(s) Oral every 4 hours PRN Mild Pain (1 - 3)  oxyCODONE    5 mG/acetaminophen 325 mG 2 Tablet(s) Oral every 6 hours PRN Moderate Pain (4 - 6)  senna 2 Tablet(s) Oral at bedtime PRN Constipation      Physical exam  Gen: NAD  Neuro: AO x 3  Card: S1 S2  Pulm: CTA  Abd: soft NT ND  Ext: No edema    Subxiphoid incision CDI.      I&O's Summary    08 Jul 2019 07:01  -  09 Jul 2019 07:00  --------------------------------------------------------  IN: 720 mL / OUT: 30 mL / NET: 690 mL        Assessment  73y Female  w/ PAST MEDICAL & SURGICAL HISTORY:  Right knee pain, unspecified chronicity  High cholesterol  Mild intermittent asthma, unspecified whether complicated  History of lumpectomy: left and right breast  History of total left hip replacement  Hypertension, unspecified type  admitted with complaints of weakness found to have a pericardial effusion and heart block. (07 Jul 2019 07:33)  .  patient underwent Pericardial biopsy  Creation, pericardial window, subxiphoid approach  Placement of PPM    PLAN    Can be discharged from thoracic surgery standpoint.  Can follow up final pathology with cardiologist/primary care provider.  No need to follow up with Dr. Whitaker unless there are concerns about her incision.  If so have her call [phone] to schedule an appointment.    Discussed with Cardiothoracic Team at AM rounds.

## 2019-07-09 NOTE — PROGRESS NOTE ADULT - SUBJECTIVE AND OBJECTIVE BOX
· Chief Complaint: The patient is a 73y Female complaining of shortness of breath.	  · HPI Objective Statement: 73F hx htn hld asthma presents to the ED for shortness of breath and weakness. Pt states that shortness of breath is present with exertion. states that when she walks to the mailbox she has to stop to catch her breath. mild cough. no sob when laying flat. no cp. no had or dizziness. no diaphoresis. states that she is having work done in her kitchen and there is dust. also states that 1 month ago she was bit by a tick but received doxycycline 1 dose.	    PAST MEDICAL/SURGICAL/FAMILY/SOCIAL HISTORY:     Hypertension  Asthma  Dyslipidemia  Moderate bilateral carotid disease   Alcohol Use History:  · Have you ever consumed alcohol	never	     Tobacco Usage:  · Tobacco Usage	Never smoker	    ALLERGIES AND HOME MEDICATIONS:   Allergies:        Allergies:  	No Known Allergies:     Home Medications:   * Outpatient Medication Status not yet specified    REVIEW OF SYSTEMS:    Review of Systems:  · Review of Systems: Constitutional: No fever or chills  Eyes: No visual changes  HEENT: No throat pain  CV: No chest pain  Resp: + SOB + cough  GI: No abd pain, nausea or vomiting  : No dysuria  MSK: No musculoskeletal pain  Skin: No rash Neuro: No headache	    PHYSICAL EXAM:   · Physical Examination: Constitutional: NAD AAOx3  Eyes: PERRLA EOMI  Head: Normocephalic atraumatic  Mouth: MMM  Cardiac: bradycardic no LE swelling normal peripheral pulses  Resp: Lungs CTAB  GI: Abd s/nt/nd  Neuro: CN2-12 intact Skin: No rashes	    CURRENT ORDERS/:   · 	Activity - Bedrest, 04-Jul-2019, Active, Standard  · 	Cardiac Monitor Bedside,   Time/Priority:  STAT, 04-Jul-2019, Active, Standard  · 	IV Insert,   Time/Priority:  STAT, 04-Jul-2019, Active, Standard  · 	Pulse Oximetry,   Frequency: <Continuous>  	  Additional Instructions:  Notify Provider if oxygen saturation is LESS THAN 92%, 04-Jul-2019, Active, Standard  · 	Vital Signs,   Frequency:  per routine, 04-Jul-2019, Active, Standard    RESULTS:   · EKG Date/Time: 04-Jul-2019 08:25	  · Rate: 42	  · Interpretation: abnormal	  · Abnormal Rhythm: bradycardia	  · Acute or Evolving MI?: no	  · Axis: Right Deviation	  · ME: 302	  · QRS: 88	  · ST/T Wave: no st elevations	  · Other Findings: 1st degree av block	  · EKG Date/Time: 04-Jul-2019 08:58	  · Rate: 39	  · Interpretation: abnormal	  · Abnormal Rhythm: 3rd degree heart block	  · Acute or Evolving MI?: no	  · Axis: Normal	  · ME: 98	  · QRS: 88	  · ST/T Wave: no st elevations	    PROGRESS NOTE:   Date: 04-Jul-2019 08:59.     Progress: repeat EKG - pt in 3rd degree heart block - pads placed on patient. bp stable. cardiology paged. Arya López M.D., Attending Physician.     Date: 04-Jul-2019 09:07.     Progress: spoke with Dr. Wilhelm will see patient shortly. Arya López M.D., Attending Physician.     Date: 04-Jul-2019 09:14.     Progress: pt endorsed to DR. Pike for 3rd degree block pt to go to CCU. Arya López M.D., Attending Physician.     Date: 04-Jul-2019 09:20.     Progress: spoke with EP will see patient shortly. Arya López M.D., Attending Physician.    DISPOSITION:    Care Plan - Instructions:  Principal Discharge DX:	Exertional dyspnea  Secondary Diagnosis:	Heart block AV third degree.     Impression:  Principal Discharge Dx Exertional dyspnea.     Secondary Discharge Dx Heart block AV third degree.     Medical Decision Making:  · Physician E/M Selection	[id number] Critical Care - 30 to 74 minutes	  · The following orders were submitted:	Labs, EKG, Imaging Studies, Medications	  · EKG - Performed independent visualization (See EKG Result section above)	Yes	  · Critical Care Indication	patient was critically ill...	  · Critical Care Indication	Patient was critically ill with a high probability of imminent or life threatening deterioration.	  · Critical Care Provided	direct patient care (not related to procedure), additional history taking, interpretation of diagnostic studies, documentation, consultation with other physicians	  · Critical Care Attestation	I have personally provided the amount of critical care time documented below excluding time spent on separate procedures	  · Crtical Care Time Spent (min) Must be 30 or more minutes to qualify:	30	  · Clinical Summary  (MDM): Summarize the clinical encounter	73F hx htn hld asthma presents to the ED for shortness of breath and weakness. Pt states that shortness of breath is present with exertion. states that when she walks to the mailbox she has to stop to catch her breath. mild cough. no sob when laying flat. no cp. no had or dizziness. no diaphoresis. states that she is having work done in her kitchen and there is dust. also states that 1 month ago she was bit by a tick but received doxycycline 1 dose. exam with bradycardic no LE swelling normal peripheral pulses. son at bedside is a physician at . spoke with patient and son regarding care - given new bradycardia concern for cardiac etiology. 1st degree block - will send lyme and silvia, touch base with cardiologist. lower suspicion asthma as normal breath sounds no wheezing not tight. will obtain xray cardiac monitor and reassess. Arya López M.D., Attending Physician	        Accepting Physician:   Provider Role	Provider Name	  · Accepting Physician	Froilan Pike	    Prescriptions:   * Outpatient Medication Status not yet specified    ATTESTATION STATEMENT:    Attestations Statements:  Attending Statement: Attending Only.    PROVIDER CARE INITIATION:   · Care Initiated by:	Arya López(Attending)	  · Provider Care Initiated at:	04-Jul-2019 08:01	      Electronic Signatures:  Arya López)  (Signed 04-Jul-2019 09:21)  	Authored: HISTORY OF PRESENT ILLNESS, PAST MEDICAL/SURGICAL/FAMILY/SOCIAL HISTORY, ALLERGIES AND HOME MEDICATIONS, REVIEW OF SYSTEMS, PHYSICAL EXAM, CURRENT ORDERS/, RESULTS, PROGRESS NOTE, DISPOSITION, ATTESTATION STATEMENT, PROVIDER CARE INITIATION      Last Updated: 04-Jul-2019 09:21 by Arya López)    References:  1.  Data Referenced From "ED ADULT Triage Note" 7/4/2019 7:55 AM      7/6/19:  Patient had PPM and a pericardial window yesterday.    7/7/19:  Patient had a CXR yesterday and it showed some CHF and her IV fluids were stopped  7/9/19:  Patient's pericardial drain was removed yesterday without complication and she was transferred to med-surg.            REVIEW OF SYSTEMS:    As above  No chest pain or shortness of breath  No lightheadeness or syncope  No leg swelling  No palpitations  No claudication-like symptoms    Vital Signs Last 24 Hrs  T(C): 37 (09 Jul 2019 04:59), Max: 37 (09 Jul 2019 04:59)  T(F): 98.6 (09 Jul 2019 04:59), Max: 98.6 (09 Jul 2019 04:59)  HR: 72 (08 Jul 2019 18:09) (65 - 80)  BP: 131/61 (09 Jul 2019 04:59) (101/81 - 152/112)  BP(mean): 87 (08 Jul 2019 12:00) (76 - 87)  RR: 18 (08 Jul 2019 18:09) (16 - 18)  SpO2: 100% (08 Jul 2019 18:09) (84% - 100%)        I&O's Summary      CAPILLARY BLOOD GLUCOSE          PHYSICAL EXAM:   Patient in NAD  Neck: No JVD; Carotids:  2+ without bruits  Respiratory:  Clear to A&P  Cardiovascular: S1 and S2, regular rate + MICHELLE  Gastrointestinal:  Soft, non-tender; BS positive  Extremities: No peripheral edema  Vascular: 2+ peripheral pulses  Neurological: A/O x 3, no focal deficits      MEDICATIONS  (STANDING):  atorvastatin 40 milliGRAM(s) Oral at bedtime  docusate sodium 100 milliGRAM(s) Oral three times a day        LABS: All Labs Reviewed:  Blood Culture:   BNP Serum Pro-Brain Natriuretic Peptide: 8937 pg/mL (07-04 @ 08:30)    CBC             WBC Count: 8.19 K/uL (07-04 @ 08:30)              Hemoglobin: 11.5 g/dL (07-04 @ 08:30)              Hematocrit: 35.8 % (07-04 @ 08:30)              Mean Cell Volume: 90.4 fl (07-04 @ 08:30)              Platelet Count - Automated: 199 K/uL (07-04 @ 08:30)                        10.1   11.95 )-----------( 181      ( 07 Jul 2019 06:15 )             30.8                                          Cardiac markers             Troponin I, Serum: <0.015 ng/mL (07-04 @ 08:30)                             [place]ms        Sodium, Serum: 140 mmol/L (07-04 @ 08:30)          Potassium, Serum: 4.4 mmol/L (07-04 @ 08:30)          Blood Urea Nitrogen, Serum: 81 mg/dL (07-04 @ 08:30)          Creatinine 5.51          Magnesium, Serum: 2.4 mg/dL (07-04 @ 08:30)          Protein Total, Serum: 7.5 gm/dL (07-04 @ 08:30)                  Calcium, Total Serum: 9.1 mg/dL (07-04 @ 08:30)                  Bilirubin Total, Serum: 0.5 mg/dL (07-04 @ 08:30)          Alanine Aminotransferase (ALT/SGPT): 56 U/L (07-04 @ 08:30)          Aspartate Aminotransferase (AST/SGOT): 47 U/L (07-04 @ 08:30)    07-05    142  |  111<H>  |  73<H>  ----------------------------<  96  4.2   |  21<L>  |  4.39<H>    Ca    8.7      05 Jul 2019 06:16  Mg     2.4     07-04    TPro  6.8  /  Alb  2.7<L>  /  TBili  0.4  /  DBili  x   /  AST  38<H>  /  ALT  53  /  AlkPhos  127<H>  07-05 07-06    142  |  110<H>  |  68<H>  ----------------------------<  212<H>  4.5   |  24  |  3.07<H>    Ca    8.1<L>      06 Jul 2019 19:49    07-08    144  |  111<H>  |  56<H>  ----------------------------<  76  4.2   |  28  |  1.82<H>    Ca    8.5      08 Jul 2019 06:42    Serologies all negative                     INR: 1.11 ratio (07-04 @ 08:30)             Sedimentation Rate, Erythrocyte: 97 mm/hr (07.06.19 @ 19:49)  C-Reactive Protein, Serum (07.06.19 @ 19:49)    C-Reactive Protein, Serum: 14.49 mg/dL        RADIOLOGY:    EKG:  Sinus rhythm; 2:1 AV block; old ASWMI(unchanged)    Telemetry:   second degree heart block    ECHO:  < from: Transthoracic Echocardiogram (07.04.19 @ 11:38) >   The left ventricle is normal in size. Estimated left ventricular ejection   fraction is 65-70 %.   The left atrium is moderately dilated.   Significant fibrocalcific changes noted to the aortic valve leaflets with   restriction in leaflet excursion. Peak transaortic gradient is 26mmHg;   this finding is consistent with mild aortic stenosis.   Moderate (2+) aortic regurgitation is present.   Fibrocalcific changes noted to the mitral valve leaflets with restriction   in mitral leaflet excursion. Mean transmitral gradient is 15 mmHg; this   is   finding is consistent with severe mitral stenosis. Mild mitral   regurgitation noted. Impaired relaxation noted.   Moderate posterior mitral annular calcification noted.   The tricuspid valve leaflets are well seen and appear thin and pliable   with preserved leaflets excursion. Mild tricuspid regurgitation noted.   Moderate pulmonary hypertension is suggested.   Severe pulmonary hypertension.   The pulmonicvalve is not well seen. Trace to mild pulmonic regurgitation   noted.   Moderate to large circumferential pericardial effusion noted without echo   evidence for tamponade.    < end of copied text >

## 2019-07-09 NOTE — PROGRESS NOTE ADULT - SUBJECTIVE AND OBJECTIVE BOX
Patient is a 73y Female who reports no complaints overnight. Feels better, eager to leave.     MEDICATIONS  (STANDING):  atorvastatin 40 milliGRAM(s) Oral at bedtime  docusate sodium 100 milliGRAM(s) Oral three times a day  polyethylene glycol 3350 17 Gram(s) Oral daily    MEDICATIONS  (PRN):  acetaminophen   Tablet .. 650 milliGRAM(s) Oral every 6 hours PRN Temp greater or equal to 38C (100.4F), Mild Pain (1 - 3)  ALBUTerol    90 MICROgram(s) HFA Inhaler 2 Puff(s) Inhalation every 6 hours PRN Bronchospasm  fentaNYL    Injectable 50 MICROGram(s) IV Push every 5 minutes PRN Severe Pain (7 - 10)  oxyCODONE    5 mG/acetaminophen 325 mG 1 Tablet(s) Oral every 4 hours PRN Mild Pain (1 - 3)  oxyCODONE    5 mG/acetaminophen 325 mG 2 Tablet(s) Oral every 6 hours PRN Moderate Pain (4 - 6)  senna 2 Tablet(s) Oral at bedtime PRN Constipation        T(C): , Max: 37 (07-09-19 @ 04:59)  T(F): , Max: 98.6 (07-09-19 @ 04:59)  HR: 80 (07-09-19 @ 14:00)  BP: 130/60 (07-09-19 @ 14:00)  BP(mean): --  RR: 16 (07-09-19 @ 14:00)  SpO2: 99% (07-09-19 @ 14:00)  Wt(kg): --    07-08 @ 07:01  -  07-09 @ 07:00  --------------------------------------------------------  IN: 720 mL / OUT: 30 mL / NET: 690 mL          PHYSICAL EXAM:    Constitutional: NAD  HEENT: PERRLA, EOMI,  MMM  Neck: No LAD, No JVD  Respiratory: CTAB  Cardiovascular: S1 and S2,l sided ppm  Gastrointestinal: BS+, soft, NT/ND  Extremities: No peripheral edema  Neurological: A/O x 3, no focal deficits  Psychiatric: Normal mood, normal affect  : No Alfredo  Skin: No rashes  Access: Not applicable        LABS:                        11.7   6.90  )-----------( 197      ( 09 Jul 2019 08:22 )             36.7     09 Jul 2019 08:22    142    |  111    |  37     ----------------------------<  140    4.2     |  27     |  1.40   08 Jul 2019 06:42    144    |  111    |  56     ----------------------------<  76     4.2     |  28     |  1.82   06 Jul 2019 19:49    142    |  110    |  68     ----------------------------<  212    4.5     |  24     |  3.07   06 Jul 2019 08:40    144    |  112    |  67     ----------------------------<  220    4.6     |  20     |  3.30     Ca    8.6        09 Jul 2019 08:22  Ca    8.5        08 Jul 2019 06:42  Ca    8.1        06 Jul 2019 19:49  Ca    8.6        06 Jul 2019 08:40            Urine Studies:          RADIOLOGY & ADDITIONAL STUDIES:

## 2019-07-09 NOTE — DISCHARGE NOTE PROVIDER - CARE PROVIDERS DIRECT ADDRESSES
,DirectAddress_Unknown,[email or web address],DirectAddress_Unknown

## 2019-07-09 NOTE — PROGRESS NOTE ADULT - PROVIDER SPECIALTY LIST ADULT
CT Surgery
CT Surgery
Cardiology
Hospitalist
Nephrology
Thoracic Surgery
Thoracic Surgery
Electrophysiology
Nephrology

## 2019-07-09 NOTE — DISCHARGE NOTE PROVIDER - HOSPITAL COURSE
73F.  admitted 07/04/2019.  presented to ED c/o dyspnea.  progressive in nature.  onset 2-3 days prior to admission.  incidently reports hx of recent insect bite ~ 2 weeks ago.  in ED, found to be in 3rd degree HB.  prophylactic does of doxycycline given.    PMHx:  moderate b/l carotid artery disease;  HTN;  HLD;  mild intermittent asthma        Assessment:    1.3rd degree HB.    2.moderate to large pericardial effusion.    3.ABIOLA.    4.elevated ESR/CRP    5.hx HTN.    6.hx asthma.    7.abnormal TFT's, sick euthyroid syndrome        Plan:    -TTE LVEF 65-70%.  mod AI.  mod pHTN.  mod-large pericardial effusion-no tamponade.    -PPM placement 7/5    -s/p pericardial window and biopsy. pathology pending. will need to f/u with cardiology for results    -TSH, rheumatologic and infectious serologies. culture NGTD    -renal US, no obstruction. held NSAIDs and ARB.  IVFs (keep net positive to improve MAP). creat improving. d/w Dr. Gallardo and ok to d/c home today and to resume ARB. pt will need repeat BMP as outpatient in 1 week.    -c/w chronic medical issue management.    -drian removal 7/8 per CT surgery    -will need repeat TFT's in 4-6 weeks as OP, has to f/u with PCP for this    -DVT prophylaxis.    -discussed w/ RN    -pt also cleared for d/c per cardiology and CT surgery        T(C): 37 (07-09-19 @ 14:00), Max: 37 (07-09-19 @ 04:59)    HR: 80 (07-09-19 @ 14:00) (72 - 80)    BP: 130/60 (07-09-19 @ 14:00) (130/60 - 152/112)    RR: 16 (07-09-19 @ 14:00) (16 - 18)    SpO2: 99% (07-09-19 @ 14:00) (99% - 100%)        Gen - Pleasant, cooperative, in no acute distress    HEENT- PERRL, moist mucus membranes, OP clear    CV - RRR, No m/r/g, +pulses, no edema. drain intact w/ serosang fluid    Lungs - Good effort, Clear to auscultation bilaterally    Abdomen - Soft, nontender, nondistended, +BS, No rebound/rigidity/guarding    Ext - No cyanosis/clubbing.     Skin - No rashes, No jaundice    Psych- Alert & oriented x 3    Neuro- fluent speech, no facial droop, EOMI. moves all ext        Dispo: discharge to home in stable condition        Final diagnosis, treatment plan, and follow-up recommendations were discussed and explained to the patient. The patient was given an opportunity to ask questions concerning the diagnosis and treatment plan. The patient acknowledged understanding of the diagnosis, treatment, and follow-up recommendations. The patient was advised to seek urgent care upon discharge if worsening symptoms develop prior to scheduled follow-up. Time spent on discharge included time with the patient, and also coordinating discharge care as outlined below.        Total time spent: 50 min

## 2019-07-09 NOTE — DISCHARGE NOTE PROVIDER - PROVIDER TOKENS
PROVIDER:[TOKEN:[[id number]],FOLLOWUP:[1 week]],PROVIDER:[TOKEN:[[id number]]],PROVIDER:[TOKEN:[[id number]],FOLLOWUP:[Routine]]

## 2019-07-09 NOTE — PROGRESS NOTE ADULT - ASSESSMENT
73 year old woman with the above history admitted with symptomatic heart block but also with new onset acute renal insufficiency.  This may be related to ATN perhaps from hypotension related to her heart block and some degree of intravascular depletion along with her ARB therapy.  Will give IV hydration and hold hyzaar.  If creatinine improves by tomorrow would then proceed with PPM implantation for her heart block.  Check TTE    7/5/19:  Patient with moderate to large pericardial effusion.  Is to go for drainage today by Dr White.  Creatinine improving.  Mitral valve disease has worsened since her echo 18 months ago(which reveal a small to moderate pericardial effusion as well).  Continue IV hydration.  Blood work sent off for autoimmune work up.  Will revue TTE personally.  Also for PPM insertion.    7/6/19:  Patient appears stable post PPM insertion and pericardial window.  Awaiting results from pericardial biopsy/fluid analysis.  Also awaiting today's BMP results.    7/7/19:  Patient appears to be improving.  She is weak.  She does have high inflammatory markers.  ? underlying process as the cause of her pericardial effusion.  She is not hypothryoid.  Awaiting fluid anaysis.  Renal function improving.  Continue present management.    7/8/19:  Patient improving.  Creatinine almost fully normalized.  Hopefully take drain out today.  Increase ambulation.  Discharge soon.    7/9/19:  Patient is clinically stable.  Can discharge when ambulating without difficulty

## 2019-07-09 NOTE — DISCHARGE NOTE NURSING/CASE MANAGEMENT/SOCIAL WORK - NSDCDPATPORTLINK_GEN_ALL_CORE
You can access the [facility] Patient Portal, offered by [facility], by registering with the following website: [email or web address]

## 2019-07-09 NOTE — DISCHARGE NOTE PROVIDER - NSDCHHNEEDSERVICE_GEN_ALL_CORE
Medication teaching and assessment/Observation and assessment/Rehabilitation services/Teaching and training/Wound care and assessment

## 2019-07-09 NOTE — DISCHARGE NOTE PROVIDER - NSDCFUADDINST_GEN_ALL_CORE_FT
PCP- follow up in 1 week. Bring these papers with you to that appointment so your PCP can request records from your hospital stay.    LABS:   BMP in 1 week  TSH, FREE T4, FREE T3 in 4-6 weeks    pathology results from pericardial biopsy are still pending. please follow up with cardiology/PCP for results.

## 2019-07-09 NOTE — DISCHARGE NOTE PROVIDER - CARE PROVIDER_API CALL
Dejan Wilhelm)  Cardiovascular Disease; Internal Medicine  [address], [address]  [place] [address]  Phone: [phone]  Fax: [phone]  Follow Up Time: 1 week    Han Gallardo)  Internal Medicine; Nephrology  [address] [place] [address]  Phone: [phone]  Fax: [phone]  Follow Up Time:     Maria Luisa Whitaker)  Thoracic and Cardiac Surgery  [address] [place] [address]  Phone: [phone]  Fax: [phone]  Follow Up Time: Routine

## 2019-07-10 LAB
CULTURE RESULTS: NO GROWTH — SIGNIFICANT CHANGE UP
SPECIMEN SOURCE: SIGNIFICANT CHANGE UP

## 2019-07-13 LAB
B MICROTI IGG TITR SER: SIGNIFICANT CHANGE UP
B MICROTI IGM TITR SER: SIGNIFICANT CHANGE UP
BABESIA AB SER-ACNC: SIGNIFICANT CHANGE UP

## 2019-07-16 DIAGNOSIS — I44.2 ATRIOVENTRICULAR BLOCK, COMPLETE: ICD-10-CM

## 2019-07-16 DIAGNOSIS — E78.5 HYPERLIPIDEMIA, UNSPECIFIED: ICD-10-CM

## 2019-07-16 DIAGNOSIS — N17.0 ACUTE KIDNEY FAILURE WITH TUBULAR NECROSIS: ICD-10-CM

## 2019-07-16 DIAGNOSIS — I10 ESSENTIAL (PRIMARY) HYPERTENSION: ICD-10-CM

## 2019-07-16 DIAGNOSIS — I31.3 PERICARDIAL EFFUSION (NONINFLAMMATORY): ICD-10-CM

## 2019-07-16 DIAGNOSIS — R06.02 SHORTNESS OF BREATH: ICD-10-CM

## 2019-07-16 DIAGNOSIS — J45.20 MILD INTERMITTENT ASTHMA, UNCOMPLICATED: ICD-10-CM

## 2019-07-17 ENCOUNTER — OUTPATIENT (OUTPATIENT)
Dept: OUTPATIENT SERVICES | Facility: HOSPITAL | Age: 74
LOS: 1 days | End: 2019-07-17
Payer: MEDICARE

## 2019-07-17 ENCOUNTER — APPOINTMENT (OUTPATIENT)
Dept: THORACIC SURGERY | Facility: CLINIC | Age: 74
End: 2019-07-17
Payer: MEDICARE

## 2019-07-17 DIAGNOSIS — I31.3 PERICARDIAL EFFUSION (NONINFLAMMATORY): ICD-10-CM

## 2019-07-17 PROCEDURE — 71046 X-RAY EXAM CHEST 2 VIEWS: CPT

## 2019-07-17 PROCEDURE — 99024 POSTOP FOLLOW-UP VISIT: CPT

## 2019-07-17 PROCEDURE — 71046 X-RAY EXAM CHEST 2 VIEWS: CPT | Mod: 26

## 2019-07-19 ENCOUNTER — APPOINTMENT (OUTPATIENT)
Dept: ELECTROPHYSIOLOGY | Facility: CLINIC | Age: 74
End: 2019-07-19
Payer: MEDICARE

## 2019-07-19 VITALS
DIASTOLIC BLOOD PRESSURE: 76 MMHG | WEIGHT: 150 LBS | HEIGHT: 62 IN | BODY MASS INDEX: 27.6 KG/M2 | HEART RATE: 70 BPM | SYSTOLIC BLOOD PRESSURE: 153 MMHG

## 2019-07-19 PROCEDURE — 93280 PM DEVICE PROGR EVAL DUAL: CPT

## 2019-08-03 LAB
CULTURE RESULTS: SIGNIFICANT CHANGE UP
SPECIMEN SOURCE: SIGNIFICANT CHANGE UP

## 2019-08-24 LAB
CULTURE RESULTS: SIGNIFICANT CHANGE UP
SPECIMEN SOURCE: SIGNIFICANT CHANGE UP

## 2019-08-26 ENCOUNTER — RESULT REVIEW (OUTPATIENT)
Age: 74
End: 2019-08-26

## 2019-08-26 ENCOUNTER — INPATIENT (INPATIENT)
Facility: HOSPITAL | Age: 74
LOS: 6 days | Discharge: HOME | DRG: 315 | End: 2019-09-02
Attending: THORACIC SURGERY (CARDIOTHORACIC VASCULAR SURGERY) | Admitting: THORACIC SURGERY (CARDIOTHORACIC VASCULAR SURGERY)
Payer: MEDICARE

## 2019-08-26 ENCOUNTER — EMERGENCY (EMERGENCY)
Facility: HOSPITAL | Age: 74
LOS: 0 days | Discharge: ANOTHER HOSPITAL | End: 2019-08-26
Attending: EMERGENCY MEDICINE
Payer: MEDICARE

## 2019-08-26 VITALS — WEIGHT: 139.99 LBS

## 2019-08-26 VITALS
TEMPERATURE: 98 F | HEART RATE: 91 BPM | SYSTOLIC BLOOD PRESSURE: 137 MMHG | DIASTOLIC BLOOD PRESSURE: 87 MMHG | OXYGEN SATURATION: 97 % | RESPIRATION RATE: 24 BRPM

## 2019-08-26 VITALS
DIASTOLIC BLOOD PRESSURE: 87 MMHG | SYSTOLIC BLOOD PRESSURE: 137 MMHG | HEART RATE: 91 BPM | TEMPERATURE: 98 F | RESPIRATION RATE: 18 BRPM | OXYGEN SATURATION: 98 %

## 2019-08-26 DIAGNOSIS — E78.5 HYPERLIPIDEMIA, UNSPECIFIED: ICD-10-CM

## 2019-08-26 DIAGNOSIS — R07.9 CHEST PAIN, UNSPECIFIED: ICD-10-CM

## 2019-08-26 DIAGNOSIS — I30.9 ACUTE PERICARDITIS, UNSPECIFIED: ICD-10-CM

## 2019-08-26 DIAGNOSIS — J45.909 UNSPECIFIED ASTHMA, UNCOMPLICATED: ICD-10-CM

## 2019-08-26 DIAGNOSIS — I10 ESSENTIAL (PRIMARY) HYPERTENSION: ICD-10-CM

## 2019-08-26 LAB
ALBUMIN SERPL ELPH-MCNC: 4.3 G/DL — SIGNIFICANT CHANGE UP (ref 3.3–5)
ALP SERPL-CCNC: 122 U/L — HIGH (ref 40–120)
ALT FLD-CCNC: 30 U/L — SIGNIFICANT CHANGE UP (ref 12–78)
ANION GAP SERPL CALC-SCNC: 5 MMOL/L — SIGNIFICANT CHANGE UP (ref 5–17)
APTT BLD: 32.8 SEC — SIGNIFICANT CHANGE UP (ref 27.5–36.3)
AST SERPL-CCNC: 24 U/L — SIGNIFICANT CHANGE UP (ref 15–37)
BASOPHILS # BLD AUTO: 0.03 K/UL — SIGNIFICANT CHANGE UP (ref 0–0.2)
BASOPHILS NFR BLD AUTO: 0.2 % — SIGNIFICANT CHANGE UP (ref 0–2)
BILIRUB SERPL-MCNC: 0.6 MG/DL — SIGNIFICANT CHANGE UP (ref 0.2–1.2)
BUN SERPL-MCNC: 32 MG/DL — HIGH (ref 7–23)
CALCIUM SERPL-MCNC: 9.8 MG/DL — SIGNIFICANT CHANGE UP (ref 8.5–10.1)
CHLORIDE SERPL-SCNC: 104 MMOL/L — SIGNIFICANT CHANGE UP (ref 96–108)
CO2 SERPL-SCNC: 29 MMOL/L — SIGNIFICANT CHANGE UP (ref 22–31)
CREAT SERPL-MCNC: 1.43 MG/DL — HIGH (ref 0.5–1.3)
EOSINOPHIL # BLD AUTO: 0.01 K/UL — SIGNIFICANT CHANGE UP (ref 0–0.5)
EOSINOPHIL NFR BLD AUTO: 0.1 % — SIGNIFICANT CHANGE UP (ref 0–6)
GLUCOSE SERPL-MCNC: 170 MG/DL — HIGH (ref 70–99)
HCT VFR BLD CALC: 42.5 % — SIGNIFICANT CHANGE UP (ref 34.5–45)
HGB BLD-MCNC: 13.3 G/DL — SIGNIFICANT CHANGE UP (ref 11.5–15.5)
IMM GRANULOCYTES NFR BLD AUTO: 0.2 % — SIGNIFICANT CHANGE UP (ref 0–1.5)
INR BLD: 0.99 RATIO — SIGNIFICANT CHANGE UP (ref 0.88–1.16)
LIDOCAIN IGE QN: 277 U/L — SIGNIFICANT CHANGE UP (ref 73–393)
LYMPHOCYTES # BLD AUTO: 0.67 K/UL — LOW (ref 1–3.3)
LYMPHOCYTES # BLD AUTO: 5.4 % — LOW (ref 13–44)
MAGNESIUM SERPL-MCNC: 2 MG/DL — SIGNIFICANT CHANGE UP (ref 1.6–2.6)
MCHC RBC-ENTMCNC: 28.7 PG — SIGNIFICANT CHANGE UP (ref 27–34)
MCHC RBC-ENTMCNC: 31.3 GM/DL — LOW (ref 32–36)
MCV RBC AUTO: 91.6 FL — SIGNIFICANT CHANGE UP (ref 80–100)
MONOCYTES # BLD AUTO: 0.96 K/UL — HIGH (ref 0–0.9)
MONOCYTES NFR BLD AUTO: 7.7 % — SIGNIFICANT CHANGE UP (ref 2–14)
NEUTROPHILS # BLD AUTO: 10.72 K/UL — HIGH (ref 1.8–7.4)
NEUTROPHILS NFR BLD AUTO: 86.4 % — HIGH (ref 43–77)
NT-PROBNP SERPL-SCNC: 458 PG/ML — HIGH (ref 0–450)
PLATELET # BLD AUTO: 213 K/UL — SIGNIFICANT CHANGE UP (ref 150–400)
POTASSIUM SERPL-MCNC: 3.7 MMOL/L — SIGNIFICANT CHANGE UP (ref 3.5–5.3)
POTASSIUM SERPL-SCNC: 3.7 MMOL/L — SIGNIFICANT CHANGE UP (ref 3.5–5.3)
PROT SERPL-MCNC: 8.2 GM/DL — SIGNIFICANT CHANGE UP (ref 6–8.3)
PROTHROM AB SERPL-ACNC: 11 SEC — SIGNIFICANT CHANGE UP (ref 10–12.9)
RBC # BLD: 4.64 M/UL — SIGNIFICANT CHANGE UP (ref 3.8–5.2)
RBC # FLD: 13 % — SIGNIFICANT CHANGE UP (ref 10.3–14.5)
SODIUM SERPL-SCNC: 138 MMOL/L — SIGNIFICANT CHANGE UP (ref 135–145)
TROPONIN I SERPL-MCNC: <0.015 NG/ML — SIGNIFICANT CHANGE UP (ref 0.01–0.04)
WBC # BLD: 12.42 K/UL — HIGH (ref 3.8–10.5)
WBC # FLD AUTO: 12.42 K/UL — HIGH (ref 3.8–10.5)

## 2019-08-26 PROCEDURE — 71045 X-RAY EXAM CHEST 1 VIEW: CPT

## 2019-08-26 PROCEDURE — 99285 EMERGENCY DEPT VISIT HI MDM: CPT

## 2019-08-26 PROCEDURE — 36415 COLL VENOUS BLD VENIPUNCTURE: CPT

## 2019-08-26 PROCEDURE — 71250 CT THORAX DX C-: CPT | Mod: 26

## 2019-08-26 PROCEDURE — 71045 X-RAY EXAM CHEST 1 VIEW: CPT | Mod: 26

## 2019-08-26 PROCEDURE — 83690 ASSAY OF LIPASE: CPT

## 2019-08-26 PROCEDURE — 99285 EMERGENCY DEPT VISIT HI MDM: CPT | Mod: 25

## 2019-08-26 PROCEDURE — 93005 ELECTROCARDIOGRAM TRACING: CPT

## 2019-08-26 PROCEDURE — 93306 TTE W/DOPPLER COMPLETE: CPT | Mod: 26

## 2019-08-26 PROCEDURE — 96374 THER/PROPH/DIAG INJ IV PUSH: CPT | Mod: XU

## 2019-08-26 PROCEDURE — 86900 BLOOD TYPING SEROLOGIC ABO: CPT

## 2019-08-26 PROCEDURE — 93010 ELECTROCARDIOGRAM REPORT: CPT | Mod: 77,76

## 2019-08-26 PROCEDURE — 85025 COMPLETE CBC W/AUTO DIFF WBC: CPT

## 2019-08-26 PROCEDURE — 84484 ASSAY OF TROPONIN QUANT: CPT

## 2019-08-26 PROCEDURE — 86901 BLOOD TYPING SEROLOGIC RH(D): CPT

## 2019-08-26 PROCEDURE — 85610 PROTHROMBIN TIME: CPT

## 2019-08-26 PROCEDURE — 93010 ELECTROCARDIOGRAM REPORT: CPT

## 2019-08-26 PROCEDURE — 85730 THROMBOPLASTIN TIME PARTIAL: CPT

## 2019-08-26 PROCEDURE — 86850 RBC ANTIBODY SCREEN: CPT

## 2019-08-26 PROCEDURE — 83880 ASSAY OF NATRIURETIC PEPTIDE: CPT

## 2019-08-26 PROCEDURE — 93306 TTE W/DOPPLER COMPLETE: CPT

## 2019-08-26 PROCEDURE — 83735 ASSAY OF MAGNESIUM: CPT

## 2019-08-26 PROCEDURE — 80053 COMPREHEN METABOLIC PANEL: CPT

## 2019-08-26 PROCEDURE — 71250 CT THORAX DX C-: CPT

## 2019-08-26 RX ORDER — MORPHINE SULFATE 50 MG/1
4 CAPSULE, EXTENDED RELEASE ORAL ONCE
Refills: 0 | Status: DISCONTINUED | OUTPATIENT
Start: 2019-08-26 | End: 2019-08-26

## 2019-08-26 RX ORDER — LOSARTAN POTASSIUM 100 MG/1
1 TABLET, FILM COATED ORAL
Qty: 0 | Refills: 0 | DISCHARGE

## 2019-08-26 RX ADMIN — MORPHINE SULFATE 4 MILLIGRAM(S): 50 CAPSULE, EXTENDED RELEASE ORAL at 18:42

## 2019-08-26 RX ADMIN — MORPHINE SULFATE 4 MILLIGRAM(S): 50 CAPSULE, EXTENDED RELEASE ORAL at 20:22

## 2019-08-26 NOTE — ED ADULT TRIAGE NOTE - CHIEF COMPLAINT QUOTE
pt co chest pain sicne 1130am, with SOB and dizziness when bending down and bending back up. pt deneis weakness. pt states she has hx of peridcardial effusion and PPm as well as elevated creatinine.

## 2019-08-26 NOTE — ED ADULT NURSE NOTE - NSIMPLEMENTINTERV_GEN_ALL_ED
Implemented All Fall Risk Interventions:  [place] to call system. Call bell, personal items and telephone within reach. Instruct patient to call for assistance. Room bathroom lighting operational. Non-slip footwear when patient is off stretcher. Physically safe environment: no spills, clutter or unnecessary equipment. Stretcher in lowest position, wheels locked, appropriate side rails in place. Provide visual cue, wrist band, yellow gown, etc. Monitor gait and stability. Monitor for mental status changes and reorient to person, place, and time. Review medications for side effects contributing to fall risk. Reinforce activity limits and safety measures with patient and family.

## 2019-08-26 NOTE — H&P ADULT - HISTORY OF PRESENT ILLNESS
74 year old female with PMH HTN, HLD, L THR, bilat breast lumpectomy (declined further treatments per son), asthma, tick bite mid June 2019 (testing negative per son), 2nd degree heart block, s/p PPM insertion and pericardial window w/ biopsy (etiology unclear, negative for malignancy) on 7/5/19, ABIOLA during July 2019 admission.  Pt went to ED for evaluation d/t CP starting at approx 1130am described as feels like a heavy weight, is 8/10 (and unrelieved by morphine given at [facility] prior to transfer to [facility]), "heavy feeling" mostly to left back/subscapula region. Lying flat exacerbates the pain. + exercise intolerance (ex: gets winded when showering over past few days). + HA (7/10, "pressure", worse when lifting something). Denies photophobia, blurry or double vision, syncope, trauma, neck pain or stiffness, numbness/tingling, fever/chills, palpitations, diaphoresis, N/V, abd pain, other c/o.

## 2019-08-26 NOTE — ED PROVIDER NOTE - OBJECTIVE STATEMENT
75 y/o F pt with PMHx of lumpectomy, hip replacement, pericardial window placement presents to the ED BIBA from [facility] for cardiothoracic consult (from Dr Whitaker). Pt had a pacemaker placed July 4th. Pt states that this morning, she drove her granddaughter somewhere, but around 1130, she began to feel like "something fell on my chest," and the pressure and discomfort was radiating to her chest. Pain/pressure sensation was not relieved by Advil intake, and pt's son brought her to [place]. Per son, pt was given morphine around 1830 today, but is still experiencing pain and discomfort in her back. Pt states that when she lifts her head she feels slightly dizzy. She also reports that pain is similar to the pain she felt on July 4th. Fenies fever, chills, diaphoresis, syncope. 75 y/o F pt with PMHx of lumpectomy, hip replacement, pericardial window placement presents to the ED BIBA from [facility] for cardiothoracic consult (from Dr Whitaker) s/p detection of pericardial effusion, c/o chest discomfort which onset this morning. Discomfort is a "pressure" sensation, and radiates to pt's back. Pt had a pacemaker placed July 4th. Pt states that this morning, she drove her granddaughter somewhere, but around 1130, she began to feel like "something fell on my chest," and the pressure and discomfort was radiating to her chest. Pain/pressure sensation was not relieved by Advil intake, and pt's son brought her to [place]. Per son, pt was given morphine around 1830 today, but is still experiencing pain and discomfort in her back. Pt states that when she lifts her head she feels slightly dizzy. She also reports that pain is similar to the pain she felt on July 4th. Fenies fever, chills, diaphoresis, syncope. 75 y/o F pt with PMHx of lumpectomy, hip replacement, pericardial window placement for prior pericardial effusion presents to the ED BIBA from [facility] for cardiothoracic consult (from Dr Whitaker) s/p detection of recurrent pericardial effusion, c/o chest discomfort which onset this morning. Discomfort is described as a "pressure" sensation, and radiates to pt's back. Pt had a pacemaker placed July 4th. Pt states that this morning, she drove her granddaughter somewhere, but around 1130, she began to feel like "something fell on my chest," and the pressure and discomfort was radiating to her chest. Pain/pressure sensation was not relieved by Advil intake, and pt's son brought her to [place]. Per son, pt was given morphine around 1830 today, but is still experiencing pain and discomfort in her back. Pt states that when she lifts her head she feels slightly dizzy. She also reports that pain is similar to the pain she felt on July 4th. Fenies fever, chills, diaphoresis, syncope.

## 2019-08-26 NOTE — ED PROVIDER NOTE - PMH
High cholesterol    History of lumpectomy  left and right breast  History of total left hip replacement    Hypertension, unspecified type    Mild intermittent asthma, unspecified whether complicated    Right knee pain, unspecified chronicity

## 2019-08-26 NOTE — CONSULT NOTE ADULT - SUBJECTIVE AND OBJECTIVE BOX
Reason for consult/HPI:  74 year old female with PMH HTN, HLD, L THR, bilat breast lumpectomy (declined further treatments per son), asthma, tick bite mid June 2019 (testing negative per son), 2nd degree heart block, s/p PPM insertion and pericardial window w/ biopsy (etiology unclear, negative for malignancy) on 7/5/19, ABIOLA during July 2019 admission.  Pt went to ED for evaluation d/t CP starting at approx 1130am described as feels like a heavy weight, is 8/10 (and unrelieved by morphine given at [facility] prior to transfer to [facility]), "heavy feeling" mostly to left back/subscapula region. Lying flat exacerbates the pain. + exercise intolerance (ex: gets winded when showering over past few days). + HA (7/10, "pressure", worse when lifting something). Denies photophobia, blurry or double vision, syncope, trauma, neck pain or stiffness, numbness/tingling, fever/chills, palpitations, diaphoresis, N/V, abd pain, other c/o.  Review of Systems: negative x 10 systems except as noted above.    SOCIAL HISTORY:  Smoker: denies/never  ETOH use: denies  Ilicit Drug use: denies    Occupation: retired psych RN  Lives with: alone  Assist device use:    Code Status: full code  HCP:    Home Medications:  amLODIPine 5 mg oral tablet: 1 tab(s) orally once a day (26 Aug 2019 18:43)  triamterene-hydrochlorothiazide 37.5 mg- 25 mg oral capsule: 1 cap(s) orally once a day (26 Aug 2019 18:43)  Lipitor 40mg PO QHS    Allergies: No Known Drug Allergies  Poison Ivy (Unknown)                                                            LABS (from [facility]):                        13.3   12.42 )-----------( 213      ( 26 Aug 2019 17:05 )             42.5     08-26    138  |  104  |  32<H>  ----------------------------<  170<H>  3.7   |  29  |  1.43<H>    Ca    9.8      26 Aug 2019 17:05  Mg     2.0     08-26  TPro  8.2  /  Alb  4.3  /  TBili  0.6  /  DBili  x   /  AST  24  /  ALT  30  /  AlkPhos  122<H>  08-26    PT/INR - ( 26 Aug 2019 17:05 )   PT: 11.0 sec;   INR: 0.99 ratio       PTT - ( 26 Aug 2019 17:05 )  PTT:32.8 sec      < from: CT Chest No Cont (08.26.19 @ 19:36) >  IMPRESSION: Moderate to large sized pericardial effusion. Evaluation for pericardial tamponade is limited on a nondynamic study. Recommend further evaluation with echocardiography.  Trace layering bilateral pleural effusions.  < end of copied text >    < from: Xray Chest 1 View-PORTABLE IMMEDIATE (08.26.19 @ 17:08) >  Impression: Cardiomegaly. Previously seen pleural effusions are not currently identified  Pacemaker  < end of copied text >      8/26/19 TTE: completed, official read pending      T(C): 36.8 (08-26-19 @ 23:44), Max: 36.9 (08-26-19 @ 16:33)  HR: 83 (08-26-19 @ 23:44) (83 - 103), NSR  BP: 163/73 (08-26-19 @ 23:44) (100/80 - 163/73)  RR: 19 (08-26-19 @ 23:44) (17 - 25)  SpO2: 99% (08-26-19 @ 23:44) (95% - 100%) on room air      Physical Exam  General: mild discomfort d/t pain, WD, WN, NAD  Neuro: A+O x 3, non-focal, speech clear and intact                     Eyes: PERRL, EOMI   ENT: Normal exam of nasal/oral mucosa with absence of cyanosis.   Neck: supple, no JVD, trachea midline   Chest: CTA except left basilar rales, good air entry, equal bilaterally, no wheezes/rhonchi, normal excursion, no accessory muscle use noted  CV: regular rate, regular rhythm, +S1S2  GI: soft, NT, ND, +BS  Extremities: GIANG x 4, trace edema, distal motor/neuro/circ intact  SKIN: warm, dry, intact

## 2019-08-26 NOTE — H&P ADULT - NSHPPHYSICALEXAM_GEN_ALL_CORE
T(C): 36.8 (08-26-19 @ 23:44), Max: 36.9 (08-26-19 @ 16:33)  HR: 83 (08-26-19 @ 23:44) (83 - 103), NSR  BP: 163/73 (08-26-19 @ 23:44) (100/80 - 163/73)  RR: 19 (08-26-19 @ 23:44) (17 - 25)  SpO2: 99% (08-26-19 @ 23:44) (95% - 100%) on room air      Physical Exam  General: mild discomfort d/t pain, WD, WN, NAD  Neuro: A+O x 3, non-focal, speech clear and intact                     Eyes: PERRL, EOMI   ENT: Normal exam of nasal/oral mucosa with absence of cyanosis.   Neck: supple, no JVD, trachea midline   Chest: CTA except left basilar rales, good air entry, equal bilaterally, no wheezes/rhonchi, normal excursion, no accessory muscle use noted  CV: regular rate, regular rhythm, +S1S2  GI: soft, NT, ND, +BS  Extremities: GIANG x 4, trace edema, distal motor/neuro/circ intact  SKIN: warm, dry, intact

## 2019-08-26 NOTE — ED ADULT NURSE NOTE - OBJECTIVE STATEMENT
Patient alert and oriented x4, transfer from [place] for pericardial fluid per EMS. Transfer fro Cardiothoracic consult. Patient states her chest pain with sob started this am, worse with deep breathing. lung sounds clear through out, neuro's completely intact. denies pain to upper or lower extremities. denies n/v/d. also denies dizziness. color normal for ethncity.

## 2019-08-26 NOTE — ED PROVIDER NOTE - PROGRESS NOTE DETAILS
Scribe Alexa Bromberg for attending Dr. Renteria: Spoke with Dr Altamirano as per cath lab team recommendation she will consult on pt states is hesitant to do another pericardial window and to speak to cardiology about drain placement

## 2019-08-26 NOTE — CONSULT NOTE ADULT - PROBLEM SELECTOR RECOMMENDATION 9
interventional cardiology consult for pericardialcentesis and drain placement  NPO 8/27 for above/pending consult  once that is completed pt will likely undergo VATS and partial pericardectomy (possibly 8/28 or 8/29)  f/u TTE results  avoid lowering BP (and HR) too significantly

## 2019-08-26 NOTE — H&P ADULT - NSICDXPASTSURGICALHX_GEN_ALL_CORE_FT
PAST SURGICAL HISTORY:  Cardiac pacemaker     H/O total hip arthroplasty, left     S/P pericardial window creation

## 2019-08-26 NOTE — H&P ADULT - NSICDXPASTMEDICALHX_GEN_ALL_CORE_FT
PAST MEDICAL HISTORY:  High cholesterol     History of lumpectomy left and right breast    History of second degree heart block     History of total left hip replacement     Hypertension, unspecified type     Mild intermittent asthma, unspecified whether complicated     Pericardial effusion     Right knee pain, unspecified chronicity

## 2019-08-26 NOTE — ED ADULT NURSE NOTE - NSIMPLEMENTINTERV_GEN_ALL_ED
Implemented All Universal Safety Interventions:  [place] to call system. Call bell, personal items and telephone within reach. Instruct patient to call for assistance. Room bathroom lighting operational. Non-slip footwear when patient is off stretcher. Physically safe environment: no spills, clutter or unnecessary equipment. Stretcher in lowest position, wheels locked, appropriate side rails in place.

## 2019-08-26 NOTE — ED PROVIDER NOTE - NS_ ATTENDINGSCRIBEDETAILS _ED_A_ED_FT
I, Harsh Renteria MD,  performed the initial face to face bedside interview with this patient regarding history of present illness, review of symptoms and relevant past medical, social and family history.  I completed an independent physical examination.    The history, relevant review of systems, past medical and surgical history, medical decision making, and physical examination was documented by the scribe in my presence and I attest to the accuracy of the documentation.

## 2019-08-26 NOTE — CONSULT NOTE ADULT - PROBLEM SELECTOR RECOMMENDATION 5
acute on chronic?  7/2019 creatinine was 5, remains elevated at 1.4  will consult renal in am  adjust meds per CrCl  Hold nephrotoxic meds  Trend urine output  Trend BMP

## 2019-08-26 NOTE — ED ADULT TRIAGE NOTE - CHIEF COMPLAINT QUOTE
Patient BIBA, sent from [facility] for cardiothoracic consult from Dr Whitaker, patient in no apparent distress at this time, offering no complaints, CM NSR

## 2019-08-26 NOTE — ED ADULT NURSE NOTE - OBJECTIVE STATEMENT
c/o chest pressure started at 11:30 am today, pt reports lifting heavy objects while cleaning closet yesterday, denies fever, or pain on palpation, c/o headache and dizziness on change of positive, c/o shortness of breath HX: pericardial window 07/2019 for pericardial effusion, PPM, HTN, high cholesterol, ashtma

## 2019-08-26 NOTE — H&P ADULT - PROBLEM SELECTOR PLAN 1
admit to Dr Whitaker  interventional cardiology consulted for pericardialcentesis and drain placement  NPO 8/27 for above/pending consult  once that is completed pt will likely undergo VATS and partial pericardectomy (possibly 8/28 or 8/29)  f/u TTE results  avoid lowering BP (and HR) too significantly.

## 2019-08-26 NOTE — ED PROVIDER NOTE - CARE PLAN
Principal Discharge DX:	Pericardial effusion without cardiac tamponade  Secondary Diagnosis:	Hypertension, unspecified type  Secondary Diagnosis:	ABIOLA (acute kidney injury)  Secondary Diagnosis:	Headache

## 2019-08-26 NOTE — ED ADULT NURSE NOTE - CHIEF COMPLAINT QUOTE
Patient BIBA, sent from [facility] for cardiothoracic consult from Dr Whitaker, patient in no apparent distress at this time, offering no complaints, CM NSR

## 2019-08-26 NOTE — ED ADULT NURSE NOTE - CHPI ED NUR SYMPTOMS NEG
no nausea/no syncope/no vomiting/no chills/no diaphoresis/no back pain/no fever/no congestion/no dizziness

## 2019-08-26 NOTE — H&P ADULT - PROBLEM SELECTOR PLAN 5
acute on chronic?  7/2019 creatinine was 5, remains elevated at 1.4  will consult renal in am  adjust meds per CrCl  Hold nephrotoxic meds  Trend urine output  Trend BMP.

## 2019-08-26 NOTE — H&P ADULT - ASSESSMENT
74 year old female with PMH HTN, HLD, L THR, bilat breast lumpectomy (declined further treatments per son), asthma, tick bite mid June 2019 (testing negative per son), s/p PPM insertion and pericardial window w/ biopsy (etiology unclear, negative for malignancy) on 7/5/19, ABIOLA during July 2019 admission. Pt transferred from [facility] to [facility] for further evaluation of CP and management of large pericardial effusion.

## 2019-08-26 NOTE — H&P ADULT - NSHPLABSRESULTS_GEN_ALL_CORE
13.3   12.42 )-----------( 213      ( 26 Aug 2019 17:05 )             42.5   08-26    138  |  104  |  32<H>  ----------------------------<  170<H>  3.7   |  29  |  1.43<H>    Ca    9.8      26 Aug 2019 17:05  Mg     2.0     08-26  TPro  8.2  /  Alb  4.3  /  TBili  0.6  /  DBili  x   /  AST  24  /  ALT  30  /  AlkPhos  122<H>  08-26    Prothrombin Time and INR, Plasma (08.26.19 @ 17:05)    Prothrombin Time, Plasma: 11.0 sec    INR: 0.99 ratio    Activated Partial Thromboplastin Time (08.26.19 @ 17:05)    Activated Partial Thromboplastin Time: 32.8 sec    Serum Pro-Brain Natriuretic Peptide (08.26.19 @ 17:05)    Serum Pro-Brain Natriuretic Peptide: 458 pg/mL    Troponin I, Serum (08.26.19 @ 17:05)    Troponin I, Serum: <0.015 ng/mL    < from: CT Chest No Cont (08.26.19 @ 19:36) >  IMPRESSION: Moderate to large sized pericardial effusion. Evaluation for pericardial tamponade is limited on a nondynamic study. Recommend further evaluation with echocardiography.  Trace layering bilateral pleural effusions.  < end of copied text >    < from: Xray Chest 1 View-PORTABLE IMMEDIATE (08.26.19 @ 17:08) >  Dual-lead pacemaker is again seen entering from the left. The heart is enlarged. Previously seen pleural effusions are no longer identified.   There are degenerative changes of the bony structures.  Impression: Cardiomegaly. Previously seen pleural effusions are not currently identified  Pacemaker  < end of copied text >    < from: CT Head No Cont (08.27.19 @ 00:47) >  IMPRESSION: No CT evidence of acute intracranial hemorrhage, mass effect or acute territorial infarct.  Follow-up MRI may be obtained as clinically warranted  < end of copied text >    8/29/19 TTE (completed at [facility]): reported to be large pericardial effusion with tamponade physiology, official read pending

## 2019-08-26 NOTE — H&P ADULT - PROBLEM SELECTOR PLAN 3
continue antihypertensives cautiously in setting of pericardial effusion with tamponade physiology on echo.

## 2019-08-26 NOTE — H&P ADULT - NSHPSOCIALHISTORY_GEN_ALL_CORE
Lives with: alone  Occupation: retired psych RN    Tobacco use: denies/never  Alcohol use: denies  Illicit drug use: denies    Code status: full code  HCP:

## 2019-08-26 NOTE — ED STATDOCS - PROGRESS NOTE DETAILS
[place]dreon AS for Dr. Torrez: 73 y/o female with a PMHx of Pacemaker, Pericardial Effusion presents to the ED c/o chest pain x 11:30am today. Pt states that her pain feels like a "heavy object on her chest". Pain radiates to her back and pt has SOB. +HA. Pt states that the pain has been constant. Has never had this type of discomfort before. Pt states that she took some Advil PTA, with minimal relief. No hx of stents. No numbness or tingling. Recent pericardial window July 5th with current residual fluid. Pt also had a recent medication change in her Norvasc and Lipitor. Will send pt to main ED for further evaluation. Cardio: Dejan Wilhelm. Calderon AS for Dr. Torrez: 73 y/o female with a PMHx of Pacemaker, Pericardial Effusion presents to the ED c/o chest pain x 11:30am today. Pt states that her pain feels like a "heavy object on her chest". Pain radiates to her back and pt has SOB. +HA. Pt states that the pain has been constant. Has never had this type of discomfort before. Pt states that she took some Advil PTA, with minimal relief. No hx of stents. No numbness or tingling. Recent pericardial window July 5th with current residual fluid. Pt also had a recent medication change in her Norvasc and Lipitor. Will send pt to main ED for further evaluation. Pt with ST elevations in V1-V3. Cardio: Dejan Wilhelm. Calderon AS for Dr. Torrez: 73 y/o female with a PMHx of Pacemaker, Pericardial Effusion presents to the ED c/o chest pain x 11:30am today. Pt states that her pain feels like a "heavy object on her chest". Pain radiates to her back and pt has SOB. +HA. Pt states that the pain has been constant. Has never had this type of discomfort before. Pt states that she took some Advil PTA, with minimal relief. No hx of stents. No numbness or tingling. Recent pericardial window July 5th with current residual fluid. Pt also had a recent medication change in her Norvasc and Lipitor. Will send pt to main ED for further evaluation. Pt with ST elevations in V1-V3. ST depressions in V3. Cardio: Dejan Wilhelm. Calderon GUERRERO for Dr. Torrez: Spoke with Dr. Winter.

## 2019-08-26 NOTE — ED PROVIDER NOTE - OBJECTIVE STATEMENT
73 y/o female with a PMHx of HLD, HTN, asthma, h/o lumpectomy presents to the ED c/o chest pain. Pt woke up and felt fine, went to [facility] and drove to  her daughter. Around 1130 pt felt a lot of chest discomfort and then it radiated to her back. Pt was here July 4th where she got a pacemaker. Pt still has the chest pressure and states that when she picks up her head she gets very dizzy.

## 2019-08-26 NOTE — CONSULT NOTE ADULT - SUBJECTIVE AND OBJECTIVE BOX
Reason for consult/HPI:  74 year old female with PMH HTN, HLD, L THR, bilat breast lumpectomy (declined further treatments per son), asthma, tick bite mid June 2019 (testing negative per son), 2nd degree heart block, s/p PPM insertion and pericardial window w/ biopsy (etiology unclear, negative for malignancy) on 7/5/19, ABIOLA during July 2019 admission.  Pt went to ED for evaluation d/t CP starting at approx 1130am described as feels like a heavy weight, is 8/10 (and unrelieved by morphine given at [facility] prior to transfer to [facility]), "heavy feeling" mostly to left back/subscapula region. Lying flat exacerbates the pain. + exercise intolerance (ex: gets winded when showering over past few days). + HA (7/10, "pressure", worse when lifting something). Denies photophobia, blurry or double vision, syncope, trauma, neck pain or stiffness, numbness/tingling, fever/chills, palpitations, diaphoresis, N/V, abd pain, other c/o.  Review of Systems: negative x 10 systems except as noted above.    SOCIAL HISTORY:  Smoker: denies/never  ETOH use: denies  Ilicit Drug use: denies    Occupation: retired psych RN  Lives with: alone  Assist device use:    Code Status: full code  HCP:    Home Medications:  amLODIPine 5 mg oral tablet: 1 tab(s) orally once a day (26 Aug 2019 18:43)  triamterene-hydrochlorothiazide 37.5 mg- 25 mg oral capsule: 1 cap(s) orally once a day (26 Aug 2019 18:43)  Lipitor 40mg PO QHS    Allergies: No Known Drug Allergies  Poison Ivy (Unknown)                                                            LABS (from [facility]):                        13.3   12.42 )-----------( 213      ( 26 Aug 2019 17:05 )             42.5     08-26    138  |  104  |  32<H>  ----------------------------<  170<H>  3.7   |  29  |  1.43<H>    Ca    9.8      26 Aug 2019 17:05  Mg     2.0     08-26  TPro  8.2  /  Alb  4.3  /  TBili  0.6  /  DBili  x   /  AST  24  /  ALT  30  /  AlkPhos  122<H>  08-26    PT/INR - ( 26 Aug 2019 17:05 )   PT: 11.0 sec;   INR: 0.99 ratio       PTT - ( 26 Aug 2019 17:05 )  PTT:32.8 sec      < from: CT Chest No Cont (08.26.19 @ 19:36) >  IMPRESSION: Moderate to large sized pericardial effusion. Evaluation for pericardial tamponade is limited on a nondynamic study. Recommend further evaluation with echocardiography.  Trace layering bilateral pleural effusions.  < end of copied text >    < from: Xray Chest 1 View-PORTABLE IMMEDIATE (08.26.19 @ 17:08) >  Impression: Cardiomegaly. Previously seen pleural effusions are not currently identified  Pacemaker  < end of copied text >      8/26/19 TTE: completed, official read pending      T(C): 36.8 (08-26-19 @ 23:44), Max: 36.9 (08-26-19 @ 16:33)  HR: 83 (08-26-19 @ 23:44) (83 - 103), NSR  BP: 163/73 (08-26-19 @ 23:44) (100/80 - 163/73)  RR: 19 (08-26-19 @ 23:44) (17 - 25)  SpO2: 99% (08-26-19 @ 23:44) (95% - 100%) on room air      Physical Exam  General: mild discomfort d/t pain, WD, WN, NAD  Neuro: A+O x 3, non-focal, speech clear and intact                     Eyes: PERRL, EOMI   ENT: Normal exam of nasal/oral mucosa with absence of cyanosis.   Neck: supple, no JVD, trachea midline   Chest: CTA except left basilar rales, good air entry, equal bilaterally, no wheezes/rhonchi, normal excursion, no accessory muscle use noted  CV: regular rate, regular rhythm, +S1S2  GI: soft, NT, ND, +BS  Extremities: GIANG x 4, trace edema, distal motor/neuro/circ intact  SKIN: warm, dry, intact

## 2019-08-26 NOTE — ED PROVIDER NOTE - CLINICAL SUMMARY MEDICAL DECISION MAKING FREE TEXT BOX
Pt with pericardial effusion will require admission for further management. Pt with pericardial effusion, will require admission for further management.

## 2019-08-27 DIAGNOSIS — Z96.642 PRESENCE OF LEFT ARTIFICIAL HIP JOINT: Chronic | ICD-10-CM

## 2019-08-27 DIAGNOSIS — I31.3 PERICARDIAL EFFUSION (NONINFLAMMATORY): ICD-10-CM

## 2019-08-27 DIAGNOSIS — N17.9 ACUTE KIDNEY FAILURE, UNSPECIFIED: ICD-10-CM

## 2019-08-27 DIAGNOSIS — I10 ESSENTIAL (PRIMARY) HYPERTENSION: ICD-10-CM

## 2019-08-27 DIAGNOSIS — R51 HEADACHE: ICD-10-CM

## 2019-08-27 DIAGNOSIS — Z95.0 PRESENCE OF CARDIAC PACEMAKER: Chronic | ICD-10-CM

## 2019-08-27 DIAGNOSIS — Z98.890 OTHER SPECIFIED POSTPROCEDURAL STATES: Chronic | ICD-10-CM

## 2019-08-27 DIAGNOSIS — E78.00 PURE HYPERCHOLESTEROLEMIA, UNSPECIFIED: ICD-10-CM

## 2019-08-27 LAB
ANION GAP SERPL CALC-SCNC: 13 MMOL/L — SIGNIFICANT CHANGE UP (ref 5–17)
BLD GP AB SCN SERPL QL: SIGNIFICANT CHANGE UP
BUN SERPL-MCNC: 28 MG/DL — HIGH (ref 8–20)
CALCIUM SERPL-MCNC: 9.9 MG/DL — SIGNIFICANT CHANGE UP (ref 8.6–10.2)
CHLORIDE SERPL-SCNC: 100 MMOL/L — SIGNIFICANT CHANGE UP (ref 98–107)
CO2 SERPL-SCNC: 26 MMOL/L — SIGNIFICANT CHANGE UP (ref 22–29)
CREAT SERPL-MCNC: 1.05 MG/DL — SIGNIFICANT CHANGE UP (ref 0.5–1.3)
GLUCOSE SERPL-MCNC: 171 MG/DL — HIGH (ref 70–115)
HCT VFR BLD CALC: 37.8 % — SIGNIFICANT CHANGE UP (ref 34.5–45)
HGB BLD-MCNC: 12.1 G/DL — SIGNIFICANT CHANGE UP (ref 11.5–15.5)
MAGNESIUM SERPL-MCNC: 2 MG/DL — SIGNIFICANT CHANGE UP (ref 1.6–2.6)
MCHC RBC-ENTMCNC: 29.4 PG — SIGNIFICANT CHANGE UP (ref 27–34)
MCHC RBC-ENTMCNC: 32 GM/DL — SIGNIFICANT CHANGE UP (ref 32–36)
MCV RBC AUTO: 91.7 FL — SIGNIFICANT CHANGE UP (ref 80–100)
PLATELET # BLD AUTO: 192 K/UL — SIGNIFICANT CHANGE UP (ref 150–400)
POTASSIUM SERPL-MCNC: 3.8 MMOL/L — SIGNIFICANT CHANGE UP (ref 3.5–5.3)
POTASSIUM SERPL-SCNC: 3.8 MMOL/L — SIGNIFICANT CHANGE UP (ref 3.5–5.3)
RBC # BLD: 4.12 M/UL — SIGNIFICANT CHANGE UP (ref 3.8–5.2)
RBC # FLD: 13.1 % — SIGNIFICANT CHANGE UP (ref 10.3–14.5)
SODIUM SERPL-SCNC: 139 MMOL/L — SIGNIFICANT CHANGE UP (ref 135–145)
WBC # BLD: 12.7 K/UL — HIGH (ref 3.8–10.5)
WBC # FLD AUTO: 12.7 K/UL — HIGH (ref 3.8–10.5)

## 2019-08-27 PROCEDURE — 88312 SPECIAL STAINS GROUP 1: CPT | Mod: 26

## 2019-08-27 PROCEDURE — 88112 CYTOPATH CELL ENHANCE TECH: CPT | Mod: 26

## 2019-08-27 PROCEDURE — 99222 1ST HOSP IP/OBS MODERATE 55: CPT

## 2019-08-27 PROCEDURE — 88305 TISSUE EXAM BY PATHOLOGIST: CPT | Mod: 26

## 2019-08-27 PROCEDURE — 99223 1ST HOSP IP/OBS HIGH 75: CPT | Mod: 57

## 2019-08-27 PROCEDURE — 99223 1ST HOSP IP/OBS HIGH 75: CPT

## 2019-08-27 PROCEDURE — 70450 CT HEAD/BRAIN W/O DYE: CPT | Mod: 26

## 2019-08-27 RX ORDER — IBUPROFEN 200 MG
600 TABLET ORAL EVERY 8 HOURS
Refills: 0 | Status: DISCONTINUED | OUTPATIENT
Start: 2019-08-27 | End: 2019-09-02

## 2019-08-27 RX ORDER — POLYETHYLENE GLYCOL 3350 17 G/17G
17 POWDER, FOR SOLUTION ORAL DAILY
Refills: 0 | Status: DISCONTINUED | OUTPATIENT
Start: 2019-08-27 | End: 2019-09-02

## 2019-08-27 RX ORDER — TRIAMTERENE/HYDROCHLOROTHIAZID 75 MG-50MG
1 TABLET ORAL
Qty: 0 | Refills: 0 | DISCHARGE

## 2019-08-27 RX ORDER — ATORVASTATIN CALCIUM 80 MG/1
40 TABLET, FILM COATED ORAL AT BEDTIME
Refills: 0 | Status: DISCONTINUED | OUTPATIENT
Start: 2019-08-27 | End: 2019-09-02

## 2019-08-27 RX ORDER — TUBERCULIN PURIFIED PROTEIN DERIVATIVE 5 [IU]/.1ML
5 INJECTION, SOLUTION INTRADERMAL ONCE
Refills: 0 | Status: COMPLETED | OUTPATIENT
Start: 2019-08-27 | End: 2019-08-27

## 2019-08-27 RX ORDER — FENTANYL CITRATE 50 UG/ML
25 INJECTION INTRAVENOUS ONCE
Refills: 0 | Status: DISCONTINUED | OUTPATIENT
Start: 2019-08-27 | End: 2019-08-27

## 2019-08-27 RX ORDER — SODIUM CHLORIDE 9 MG/ML
1000 INJECTION, SOLUTION INTRAVENOUS
Refills: 0 | Status: DISCONTINUED | OUTPATIENT
Start: 2019-08-27 | End: 2019-08-27

## 2019-08-27 RX ORDER — ACETAMINOPHEN 500 MG
650 TABLET ORAL ONCE
Refills: 0 | Status: COMPLETED | OUTPATIENT
Start: 2019-08-27 | End: 2019-08-27

## 2019-08-27 RX ORDER — SODIUM CHLORIDE 9 MG/ML
3 INJECTION INTRAMUSCULAR; INTRAVENOUS; SUBCUTANEOUS EVERY 8 HOURS
Refills: 0 | Status: DISCONTINUED | OUTPATIENT
Start: 2019-08-27 | End: 2019-09-02

## 2019-08-27 RX ORDER — COLCHICINE 0.6 MG
0.6 TABLET ORAL
Refills: 0 | Status: DISCONTINUED | OUTPATIENT
Start: 2019-08-27 | End: 2019-08-29

## 2019-08-27 RX ORDER — ACETAMINOPHEN 500 MG
650 TABLET ORAL EVERY 6 HOURS
Refills: 0 | Status: DISCONTINUED | OUTPATIENT
Start: 2019-08-27 | End: 2019-08-27

## 2019-08-27 RX ORDER — AMLODIPINE BESYLATE 2.5 MG/1
1 TABLET ORAL
Qty: 0 | Refills: 0 | DISCHARGE

## 2019-08-27 RX ORDER — MORPHINE SULFATE 50 MG/1
4 CAPSULE, EXTENDED RELEASE ORAL ONCE
Refills: 0 | Status: DISCONTINUED | OUTPATIENT
Start: 2019-08-27 | End: 2019-08-27

## 2019-08-27 RX ADMIN — Medication 0.6 MILLIGRAM(S): at 17:46

## 2019-08-27 RX ADMIN — TUBERCULIN PURIFIED PROTEIN DERIVATIVE 5 UNIT(S): 5 INJECTION, SOLUTION INTRADERMAL at 09:11

## 2019-08-27 RX ADMIN — Medication 600 MILLIGRAM(S): at 21:19

## 2019-08-27 RX ADMIN — ATORVASTATIN CALCIUM 40 MILLIGRAM(S): 80 TABLET, FILM COATED ORAL at 01:55

## 2019-08-27 RX ADMIN — ATORVASTATIN CALCIUM 40 MILLIGRAM(S): 80 TABLET, FILM COATED ORAL at 21:20

## 2019-08-27 RX ADMIN — Medication 650 MILLIGRAM(S): at 01:55

## 2019-08-27 RX ADMIN — SODIUM CHLORIDE 40 MILLILITER(S): 9 INJECTION, SOLUTION INTRAVENOUS at 09:14

## 2019-08-27 RX ADMIN — POLYETHYLENE GLYCOL 3350 17 GRAM(S): 17 POWDER, FOR SOLUTION ORAL at 20:22

## 2019-08-27 RX ADMIN — SODIUM CHLORIDE 3 MILLILITER(S): 9 INJECTION INTRAMUSCULAR; INTRAVENOUS; SUBCUTANEOUS at 05:01

## 2019-08-27 RX ADMIN — SODIUM CHLORIDE 3 MILLILITER(S): 9 INJECTION INTRAMUSCULAR; INTRAVENOUS; SUBCUTANEOUS at 21:14

## 2019-08-27 RX ADMIN — SODIUM CHLORIDE 3 MILLILITER(S): 9 INJECTION INTRAMUSCULAR; INTRAVENOUS; SUBCUTANEOUS at 13:48

## 2019-08-27 NOTE — CONSULT NOTE ADULT - SUBJECTIVE AND OBJECTIVE BOX
[facility]  INFECTIOUS DISEASES AND INTERNAL MEDICINE at [place]  =======================================================  José Manuel Ortiz MD  Diplomates American Board of Internal Medicine and Infectious Diseases  =======================================================    [id number]  GHADA DIOR     CC: pericardial effusion     HPI:  73 y/o woman with PMH HTN, Left DEBBIE, bilat breast lumpectomy, asthma, 2nd degree heart block, s/p PPM insertion and pericardial window with biopsy negative for malignancy on 7/5/19, ABIOLA during July 2019 admission was admitted with chest pain and heaviness on 8/26. CT and echo showed a large pericardial effusion, so she was transferred from [facility] for management.   She has a tick removed from her leg so the concern was lyme disease, so ID was called for recommendation.   No fever. No rash, no arthritis or other manifestations of Lyme disease.   Never had TB in the past and doesn't know her TB status.     PAST MEDICAL & SURGICAL HISTORY:  History of second degree heart block  Pericardial effusion  Right knee pain, unspecified chronicity  High cholesterol  Mild intermittent asthma, unspecified whether complicated  History of lumpectomy: left and right breast  History of total left hip replacement  Hypertension, unspecified type  H/O total hip arthroplasty, left  S/P pericardial window creation  Cardiac pacemaker    Social Hx: No smoking, ETOH or drugs.     FAMILY HISTORY: brother with CAD    Allergies  No Known Drug Allergies  Poison Ivy (Unknown)    Antibiotics:  None     REVIEW OF SYSTEMS:  CONSTITUTIONAL:  No Fever or chills  HEENT:  No diplopia or blurred vision.  No sore throat or runny nose.  CARDIOVASCULAR:  No chest pain or SOB.  RESPIRATORY:  No cough, shortness of breath, PND or orthopnea.  GASTROINTESTINAL:  No nausea, vomiting or diarrhea.  GENITOURINARY:  No dysuria, frequency or urgency. No Blood in urine  MUSCULOSKELETAL:  no joint aches, no muscle pain  SKIN:  No change in skin, hair or nails.  NEUROLOGIC:  No paresthesias, fasciculations, seizures or weakness.  PSYCHIATRIC:  No disorder of thought or mood.  ENDOCRINE:  No heat or cold intolerance, polyuria or polydipsia.  HEMATOLOGICAL:  No easy bruising or bleeding.     Physical Exam:  Vital Signs Last 24 Hrs  T(C): 36.8 (27 Aug 2019 09:00), Max: 36.9 (26 Aug 2019 16:33)  T(F): 98.3 (27 Aug 2019 09:00), Max: 98.4 (26 Aug 2019 16:33)  HR: 72 (27 Aug 2019 14:00) (67 - 103)  BP: 126/60 (27 Aug 2019 14:00) (100/80 - 164/74)  BP(mean): 87 (27 Aug 2019 14:00) (87 - 107)  RR: 14 (27 Aug 2019 14:00) (14 - 25)  SpO2: 97% (27 Aug 2019 14:00) (95% - 100%)  Height (cm): 157.5 (08-27 @ 04:00)  Weight (kg): 71.6 (08-27 @ 04:00), 63.5 (08-26 @ 16:29)  BMI (kg/m2): 28.9 (08-27 @ 04:00)  BSA (m2): 1.73 (08-27 @ 04:00)  GEN: NAD  HEENT: normocephalic and atraumatic. EOMI. PERRL.    NECK: Supple.  No lymphadenopathy   LUNGS: Clear to auscultation.  HEART: Regular rate and rhythm   ABDOMEN: Soft, nontender, and nondistended.  Positive bowel sounds.    : No CVA tenderness  EXTREMITIES: Without any cyanosis, clubbing, rash, lesions or edema.  NEUROLOGIC: grossly intact.  PSYCHIATRIC: Appropriate affect .  SKIN: No ulceration or induration present.    Labs:  08-27    139  |  100  |  28.0<H>  ----------------------------<  171<H>  3.8   |  26.0  |  1.05    Ca    9.9      27 Aug 2019 07:55  Mg     2.0     08-27    TPro  8.2  /  Alb  4.3  /  TBili  0.6  /  DBili  x   /  AST  24  /  ALT  30  /  AlkPhos  122<H>  08-26                        12.1   12.70 )-----------( 192      ( 27 Aug 2019 07:55 )             37.8   PT/INR - ( 26 Aug 2019 17:05 )   PT: 11.0 sec;   INR: 0.99 ratio    PTT - ( 26 Aug 2019 17:05 )  PTT:32.8 sec    LIVER FUNCTIONS - ( 26 Aug 2019 17:05 )  Alb: 4.3 g/dL / Pro: 8.2 gm/dL / ALK PHOS: 122 U/L / ALT: 30 U/L / AST: 24 U/L / GGT: x           CARDIAC MARKERS ( 26 Aug 2019 17:05 )  <0.015 ng/mL / x     / x     / x     / x        RECENT CULTURES:  AFB smear of Pericardial fluid was negative   Culture neg as well.     ESR=95   CRP=14    All imaging and other data have been reviewed.    Assessment and Plan:   73 y/o woman with PMH HTN, Left DEBBIE, bilat breast lumpectomy, asthma, 2nd degree heart block, s/p PPM insertion and pericardial window with biopsy negative for malignancy on 7/5/19, ABIOLA during July 2019 admission was admitted with chest pain and heaviness on 8/26. CT and echo showed a large pericardial effusion.  She has a tick removed from her leg so the concern was lyme disease, so ID was called for recommendation. Cardiac manifestation of lyme usually is AV node bock, cardiomyopathy and myopericarditis can happen rarely but this patinet has a good EF with negative lyme serology. She had a neg serology about one month after the tick bite that makes it highly unlikely lyme causing effusion.     Pericardial effusion  Tick bite    - July 5th had pericardial flluid culture neg, AFB neg   - Pericardial biopsy negative   - Hepatitis panel neg  - Tick borne disease all neg (Ehrlichia, anaplasma, lyme, babesia)  - Autoimmune work up so far neg (FARIDA, dsDNA, scleroderma neg and RF minimally high)  - Viral etiology and autoimmune is more likely.   - Will send Lyme IgG/IgM panel (more than 5 IgG or more than 2 IgM is a positive test)  - Will send Quantiferon, even though in [facility] rate of LTBI is very high.   - No antibiotics recommended at this time.     Will follow.    Case discussed with MARCELINO GRANT

## 2019-08-27 NOTE — CONSULT NOTE ADULT - PROBLEM SELECTOR PROBLEM 1
Pericardial effusion without cardiac tamponade
Pericardial effusion without cardiac tamponade
Pericardial effusion with cardiac tamponade

## 2019-08-27 NOTE — CONSULT NOTE ADULT - PROBLEM SELECTOR RECOMMENDATION 2
consider head CT as d/w Dr Kenny
consider head CT as d/w Dr Kenny
Low Na diet, VS every 6 hours, permissive HTN while effusion present  Resume BB and CCB after procedure

## 2019-08-27 NOTE — CONSULT NOTE ADULT - ASSESSMENT
74 year old female with PMH HTN, HLD, L THR, bilat breast lumpectomy (declined further treatments per son), asthma, tick bite mid June 2019 (testing negative per son), s/p PPM insertion and pericardial window w/ biopsy (etiology unclear, negative for malignancy) on 7/5/19, ABIOLA during July 2019 admission. Pt transferred from [facility] to [facility] for further evaluation of CP and management of large pericardial effusion.
74 year old female with PMH HTN, HLD, L THR, bilat breast lumpectomy (declined further treatments per son), asthma, tick bite mid June 2019 (testing negative per son), s/p PPM insertion and pericardial window w/ biopsy (etiology unclear, negative for malignancy) on 7/5/19, ABIOLA during July 2019 admission. Pt transferred from [facility] to [facility] for further evaluation of CP and management of large pericardial effusion.
75 yo F with progressive chest heaviness developed around noon today, pain is described as heaviness, 8/10 in severity, mostly left sided and radiating to back "under my left shoulder blade."  Admits to associated SOB and HA as well.  Symptoms become worse during exertion or when lying flat and subside with rest.  Checked in to [facility] for evaluation and CT chest and TTE confirmed "Moderate to large sized pericardial effusion."  Patient transferred to [facility] for further evaluation and management.

## 2019-08-27 NOTE — PROGRESS NOTE ADULT - PROBLEM SELECTOR PLAN 1
Patient is hemodynamically stable at this time  Not exhibiting tamponade physiology   Last ECHO reveal  EF>75%.  Mildly increased LV wall thickness. Normal left ventricular internal cavity size. Large pericardial effusion, no echo/doppler evidence of tamponade physiology. moderate mitral stenosis.  No surgical intervention at this time given she is stable clinically.   Patient started on diet, d/c maintenance fluid   Patient being worked up for babesiosis/TB/tick born illnesses per ID because of recent tick bite  July 5th had pericardial flluid culture neg, AFB neg   Pericardial biopsy negative   Hepatitis panel neg  Tick borne disease all neg (Ehrlichia, anaplasma, lyme, babesia)  Autoimmune work up so far neg (FARIDA, dsDNA, scleroderma neg and RF minimally high)Viral etiology and autoimmune is more likely.   Will send Lyme IgG/IgM panel (more than 5 IgG or more than 2 IgM is a positive test)  Will send Quantiferon, even though in [facility] rate of LTBI is very high.   No antibiotics recommended at this time.

## 2019-08-27 NOTE — CONSULT NOTE ADULT - PROBLEM SELECTOR RECOMMENDATION 9
Keep patient NPO, check CBC, BMP, Coags, Trops  ECG in am and follow up official ECHO reading  Will schedule for pericardiocentesis and drain placement by Dr. Mueller  ??Etiology of recurrent effusion might be autoimmune Keep patient NPO, check CBC, BMP, Coags, Trops  ECG in am and follow up official ECHO reading

## 2019-08-27 NOTE — PROGRESS NOTE ADULT - SUBJECTIVE AND OBJECTIVE BOX
Brief Hospital Course:     Significant recent/past 24 hr events:  Patient recalls feeling well until June when she had tick bite.   She had negative Ehrlichia, babesia and lyme titer on prior admission. It maybe necessary to repeat these now. An empiric course of doxycycline.  PPD was checked as well.   Will also work up for TB, Quantaferon/PPD placed   Start treatment with colchicine 0.6 mg bid and ibuprofen 600 mg q8h.  Most recent ECHO does not show signs of Tamponade, no intervention at this time.     Subjective:    Review of Systems    ROS negative x 10 systems except as noted above      Patient is a 74y old  Female who presents with a chief complaint of pericardial effusion with tampoande physiology on echo (27 Aug 2019 14:44)    HPI:  74 year old female with PMH HTN, HLD, L THR, bilat breast lumpectomy (declined further treatments per son), asthma, tick bite mid June 2019 (testing negative per son), 2nd degree heart block, s/p PPM insertion and pericardial window w/ biopsy (etiology unclear, negative for malignancy) on 7/5/19, ABIOLA during July 2019 admission.  Pt went to ED for evaluation d/t CP starting at approx 1130am described as feels like a heavy weight, is 8/10 (and unrelieved by morphine given at [facility] prior to transfer to [facility]), "heavy feeling" mostly to left back/subscapula region. Lying flat exacerbates the pain. + exercise intolerance (ex: gets winded when showering over past few days). + HA (7/10, "pressure", worse when lifting something). Denies photophobia, blurry or double vision, syncope, trauma, neck pain or stiffness, numbness/tingling, fever/chills, palpitations, diaphoresis, N/V, abd pain, other c/o. (26 Aug 2019 23:17). Patient found to have moderate to large circumferential pericardial effusion without radiographic evidence of tamponade. Clinically patient appears comfortable.     PAST MEDICAL & SURGICAL HISTORY:  History of second degree heart block  Pericardial effusion  Right knee pain, unspecified chronicity  High cholesterol  Mild intermittent asthma, unspecified whether complicated  History of lumpectomy: left and right breast  History of total left hip replacement  Hypertension, unspecified type  H/O total hip arthroplasty, left  S/P pericardial window creation  Cardiac pacemaker    FAMILY HISTORY:      Vitals   ICU Vital Signs Last 24 Hrs  T(C): 36.8 (27 Aug 2019 16:00), Max: 37.2 (27 Aug 2019 14:00)  T(F): 98.2 (27 Aug 2019 16:00), Max: 99 (27 Aug 2019 14:00)  HR: 69 (27 Aug 2019 16:00) (67 - 103)  BP: 135/64 (27 Aug 2019 16:00) (100/80 - 164/74)  BP(mean): 91 (27 Aug 2019 16:00) (87 - 107)  ABP: --  ABP(mean): --  RR: 17 (27 Aug 2019 16:00) (14 - 25)  SpO2: 96% (27 Aug 2019 16:00) (95% - 100%)      VENT SETTINGS         I&O's Detail    27 Aug 2019 07:01  -  27 Aug 2019 16:41  --------------------------------------------------------  IN:    dextrose 5% + sodium chloride 0.45%.: 300 mL  Total IN: 300 mL    OUT:  Total OUT: 0 mL    Total NET: 300 mL          LABS                        12.1   12.70 )-----------( 192      ( 27 Aug 2019 07:55 )             37.8     08-27    139  |  100  |  28.0<H>  ----------------------------<  171<H>  3.8   |  26.0  |  1.05    Ca    9.9      27 Aug 2019 07:55  Mg     2.0     08-27    TPro  8.2  /  Alb  4.3  /  TBili  0.6  /  DBili  x   /  AST  24  /  ALT  30  /  AlkPhos  122<H>  08-26    PT/INR - ( 26 Aug 2019 17:05 )   PT: 11.0 sec;   INR: 0.99 ratio         PTT - ( 26 Aug 2019 17:05 )  PTT:32.8 sec    Radiology     ECHO w/color doppler     PHYSICIAN INTERPRETATION:  Left Ventricle: The left ventricular internal cavity size is normal. Left   ventricular wallthickness is mildly increased.  Global LV systolic function was hyperdynamic. Left ventricular ejection   fraction, by visual estimation, is >75%.  Right Ventricle: The right ventricular size is normal. RV systolic   function is normal. TV S' 0.1 m/s.  Left Atrium: Severely enlarged left atrium.  Right Atrium: Right atrial size not well visualized.  Pericardium: A large pericardial effusion is present. The pericardial   effusion is globally located around the entire heart. There is inversion   ofthe right atrial wall. No echo/doppler evidence of tamponade   physiology.  Mitral Valve: There is severe mitral annular calcification. Mitral valve   mean gradient is 7.5 mmHg consistent with moderate mitral stenosis. Trace   mitral valve regurgitation is seen.  Tricuspid Valve: The tricuspid valve is not well seen. Trivial tricuspid   regurgitation is visualized.  Aortic Valve: The aortic valve is trileaflet. Peak transaortic gradient   equals 15.7 mmHg, mean transaortic gradient equals 8.6 mmHg, the   calculated aortic valve area equals 1.34 cm² by the continuity equation   consistent with moderate aortic stenosis. Trivial aortic valve   regurgitation is seen.  Pulmonic Valve: The pulmonic valve was not well visualized. Trace   pulmonic valve regurgitation.  Aorta: The aortic root is normal in size and structure.  Pulmonary Artery: The pulmonary artery is not well seen.  Venous: The pulmonary veins were not well visualized. The inferior vena   cava was normal sized, with respiratory size variation greater than 50%.  Additional Comments: A pacer wire is visualized in the right ventricle   and right atrium.       Summary:   1. Left ventricular ejection fraction, by visual estimation, is >75%.   2. Technically adequate study.   3. Hyperdynamic global left ventricular systolic function.   4. Mildly increased LV wall thickness.   5. Normal left ventricular internal cavity size.   6. Large pericardial effusion.   7. No echo/doppler evidence of tamponade physiology.   8. Severe mitralannular calcification.   9. Mitral valve mean gradient is 7.5 mmHg consistent with moderate   mitral stenosis.  10. Peak transaortic gradient equals 15.7 mmHg, mean transaortic gradient   equals 8.6 mmHg, the calculated aortic valve area equals 1.34 cm² by the   continuity equation consistent with moderate aortic stenosis.    MD Max Electronically signed on 8/27/2019 at 11:16:44 AM           MEDICATIONS  (STANDING):  atorvastatin 40 milliGRAM(s) Oral at bedtime  colchicine 0.6 milliGRAM(s) Oral two times a day  dextrose 5% + sodium chloride 0.45%. 1000 milliLiter(s) (40 mL/Hr) IV Continuous <Continuous>  ibuprofen  Tablet. 600 milliGRAM(s) Oral every 8 hours  sodium chloride 0.9% lock flush 3 milliLiter(s) IV Push every 8 hours    MEDICATIONS  (PRN):    Allergies:  No Known Drug Allergies  Poison Ivy (Unknown)      Physical Exam:   Constitutional: NAD, well-groomed, well-developed  HEENT: PERRLA, EOMI, no drainage or redness  Neck: supple,  No JVD  Respiratory: Breath Sounds equal & clear bilaterally to auscultation, no rales/rhonchi/wheezing, no accessory muscle use noted  Cardiovascular: Regular rate, regular rhythm, normal S1, S2; no murmurs or rub  Gastrointestinal: Soft, non-tender, non distended, + bowel sounds  Extremities: GIANG x 4, no peripheral edema, no cyanosis, no clubbing   Neurological: A+O x 3; speech clear and intact; no sensory, motor  deficits, normal reflexes  Skin: warm, dry, well perfused

## 2019-08-27 NOTE — CONSULT NOTE ADULT - SUBJECTIVE AND OBJECTIVE BOX
[place] [facility]                                                        Office: [address] [address]                                                       Telephone: [phone]. Fax:[phone]                                                              CARDIOLOGY CONSULTATION NOTE                                                                                  obtained: No    Chief complaint:  pericardial effusion with tampoande physiology on echo     HPI: Patient is a 73 yo F recently spent a week in [place] with no acute complaints, developed progressive chest heaviness today that developed around noon.  Pain is described as heaviness, 8/10 in severity, mostly left sided and radiating to back "under my left shoulder blade."  Admits to associated SOB and HA as well.  Symptoms become worse during exertion or when lying flat and subside with rest.  Patient got concerned and so went to [facility] for evaluation.  While there, she had a CT scan of chest that confirmed "Moderate to large sized pericardial effusion."  Patient transferred to [facility] for further evaluation and management.  Patient denies palpitations, fever, chills, N/V, syncope, recent infection, neck pain or stiffness, numbness/tingling, diaphoresis, abd pain, or LE paresthesias.      REVIEW OF SYMPTOMS: Cardiovascular:  See HPI. + chest pain, + dyspnea, No syncope, No palpitations, No dizziness, No Orthopnea, No Paroxsymal nocturnal dyspnea;    Respiratory: + Dyspnea, No cough, fever or chills reported     Genitourinary: No dysuria, no hematuria;   Gastrointestinal: No nausea, no vomiting. No diarrhea. No abdominal pain. No dark color stool, no melena ; Neurological: No headache, no dizziness, no slurred speech;  Psychiatric: No agitation, no anxiety.  ALL OTHER REVIEW OF SYSTEMS ARE NEGATIVE.    ALLERGIES: No Known Drug Allergies  Poison Ivy (Unknown)    CURRENT MEDICATIONS:  atorvastatin  sodium chloride 0.9% lock flush    HOME MEDICATIONS:    PAST MEDICAL HISTORY  History of second degree heart block  Pericardial effusion  Right knee pain, unspecified chronicity  High cholesterol  Mild intermittent asthma, unspecified whether complicated  Hypertension, unspecified type  ABIOLA during July 2019 admission    PAST SURGICAL HISTORY  History of lumpectomy  H/O total hip arthroplasty, left  S/P pericardial window w/ biopsy (etiology unclear, negative for malignancy) on 7/5/19,   2nd degree heart block, s/p PPM     FAMILY HISTORY:      SOCIAL HISTORY:  Denies smoking/alcohol/drugs    CIGARETTES:       ALCOHOL:    DRUGS:    Vital Signs Last 24 Hrs  T(C): 36.8 (26 Aug 2019 23:44), Max: 36.9 (26 Aug 2019 16:33)  T(F): 98.2 (26 Aug 2019 23:44), Max: 98.4 (26 Aug 2019 16:33)  HR: 83 (26 Aug 2019 23:44) (83 - 103)  BP: 163/73 (26 Aug 2019 23:44) (100/80 - 163/73)  BP(mean): --  RR: 19 (26 Aug 2019 23:44) (17 - 25)  SpO2: 99% (26 Aug 2019 23:44) (95% - 100%)      PHYSICAL EXAM:  Constitutional: Comfortable . No acute distress.   HEENT: Atraumatic and normcephalic , neck is supple . no JVD. No carotid bruit. PEERL   CNS: A&Ox3. No focal deficits. EOMI. Cranial nerves II-IX are intact.   Lymph Nodes: Cervical : Not palpable.  Respiratory: CTAB  Cardiovascular: S1S2 RRR. No murmur/rubs or gallop.  Gastrointestinal: Soft non-tender and non distended . +Bowel sounds. negative Hewitt's sign.  Extremities: No edema.   Psychiatric: Calm . no agitation.  Skin: No skin rash/ulcers visualized to face, hands or feet.    Intake and output:     LABS:                        13.3   12.42 )-----------( 213      ( 26 Aug 2019 17:05 )             42.5     08-26    138  |  104  |  32<H>  ----------------------------<  170<H>  3.7   |  29  |  1.43<H>    Ca    9.8      26 Aug 2019 17:05  Mg     2.0     08-26    TPro  8.2  /  Alb  4.3  /  TBili  0.6  /  DBili  x   /  AST  24  /  ALT  30  /  AlkPhos  122<H>  08-26    CARDIAC MARKERS ( 26 Aug 2019 17:05 )  <0.015 ng/mL / x     / x     / x     / x        ;p-BNP=Serum Pro-Brain Natriuretic Peptide: 458 pg/mL (08-26 @ 17:05)    PT/INR - ( 26 Aug 2019 17:05 )   PT: 11.0 sec;   INR: 0.99 ratio         PTT - ( 26 Aug 2019 17:05 )  PTT:32.8 sec      INTERPRETATION OF TELEMETRY: Reviewed by me.   ECG: Reviewed by me.     RADIOLOGY & ADDITIONAL STUDIES:    X-ray:  reviewed by me.   CT scan: IMPRESSION: Moderate to large sized pericardial effusion. Evaluation for   pericardial tamponade is limited on a nondynamic study. Recommend further   evaluation with echocardiography.    Trace layering bilateral pleural effusions  MRI:   ECHO FINDINGS: Date:                : LVEF=          ; RV function:       ; Valvular abnormalities: No significant valvular abnormality.  Mitral valve:           ;  Aortic valve:              ;Tricuspid valve:         ; Pulmonary pressures:        mm Hg. Pericardium:      STRESS  FINDINGS: Date:            ;      CATHETERIZATION FINDINGS:  Date:              :  LAD:                       ;  LCx:                        ; RCA:                ; [place] [facility]                                                        Office: [address] [address]                                                       Telephone: [phone]. Fax:[phone]                                                              CARDIOLOGY CONSULTATION NOTE                                                                                  obtained: No    Chief complaint:  pericardial effusion with tampoande physiology on echo     HPI: Patient is a 75 yo F recently spent a week in [place] with no acute complaints, developed progressive chest heaviness today that developed around noon.  Pain is described as heaviness, 8/10 in severity, mostly left sided and radiating to back "under my left shoulder blade."  Admits to associated SOB and HA as well.  Symptoms become worse during exertion or when lying flat and subside with rest.  Patient got concerned and so went to [facility] for evaluation.  While there, she had a CT scan of chest that confirmed "Moderate to large sized pericardial effusion."  Patient transferred to [facility] for further evaluation and management.  Patient denies palpitations, fever, chills, N/V, syncope, recent infection, neck pain or stiffness, numbness/tingling, diaphoresis, abd pain, or LE paresthesias.      REVIEW OF SYMPTOMS: Cardiovascular:  See HPI. + chest pain, + dyspnea, No syncope, No palpitations, No dizziness, No Orthopnea, No Paroxsymal nocturnal dyspnea;    Respiratory: + Dyspnea, No cough, fever or chills reported     Genitourinary: No dysuria, no hematuria;   Gastrointestinal: No nausea, no vomiting, No diarrhea, No abdominal pain, No dark color stool, no melena;   Neurological: No headache, no dizziness, no slurred speech;    Psychiatric: No agitation, no anxiety.  ALL OTHER REVIEW OF SYSTEMS ARE NEGATIVE.    ALLERGIES: No Known Drug Allergies  Poison Ivy (Unknown)    CURRENT MEDICATIONS:  atorvastatin  sodium chloride 0.9% lock flush    HOME MEDICATIONS:  atorvastatin 40 mg oral tablet: 1 tab(s) orally once a day (at bedtime)  amLODIPine 5 mg oral tablet: 1 tab(s) orally once a day  triamterene-hydrochlorothiazide 37.5 mg- 25 mg oral capsule: 1 cap(s) orally once a day    PAST MEDICAL HISTORY  History of second degree heart block  Pericardial effusion  Right knee pain, unspecified chronicity  High cholesterol  Mild intermittent asthma, unspecified whether complicated  Hypertension, unspecified type  ABIOLA during 2019 admission    PAST SURGICAL HISTORY  History of lumpectomy  H/O total hip arthroplasty, left  S/P pericardial window w/ biopsy (etiology unclear, negative for malignancy) on 19,   2nd degree heart block, s/p PPM     FAMILY HISTORY: brother  of MI age 60    SOCIAL HISTORY:  Denies smoking/alcohol/drugs    Vital Signs Last 24 Hrs  T(C): 36.8 (26 Aug 2019 23:44), Max: 36.9 (26 Aug 2019 16:33)  T(F): 98.2 (26 Aug 2019 23:44), Max: 98.4 (26 Aug 2019 16:33)  HR: 83 (26 Aug 2019 23:44) (83 - 103)  BP: 163/73 (26 Aug 2019 23:44) (100/80 - 163/73)  BP(mean): --  RR: 19 (26 Aug 2019 23:44) (17 - 25)  SpO2: 99% (26 Aug 2019 23:44) (95% - 100%)    PHYSICAL EXAM:  Constitutional: Comfortable but with mild distress due to back pain   HEENT: Atraumatic, PERRLA, EOMI, neck is supple, no JVD   CNS: A & O x3, Motor 5/5 b/l, Sensory inatct   Respiratory: CTA b/l no wheezing or rhonchi  Cardiovascular: S1S2 RRR, no murmur/rubs, no edema   Gastrointestinal: Soft non-tender and non distended, +Bowel sounds  Extremities: No edema, 2+ peripheral pulses  Psychiatric: Calm no agitation  Skin: No skin rash/ulcers visualized to face, hands or feet.    Intake and output:     LABS:                        13.3   12.42 )-----------( 213      ( 26 Aug 2019 17:05 )             42.5         138  |  104  |  32<H>  ----------------------------<  170<H>  3.7   |  29  |  1.43<H>    Ca    9.8      26 Aug 2019 17:05  Mg     2.0         TPro  8.2  /  Alb  4.3  /  TBili  0.6  /  DBili  x   /  AST  24  /  ALT  30  /  AlkPhos  122<H>      CARDIAC MARKERS ( 26 Aug 2019 17:05 )  <0.015 ng/mL / x     / x     / x     / x        ;p-BNP=Serum Pro-Brain Natriuretic Peptide: 458 pg/mL ( @ 17:05)    PT: 11.0 sec;   INR: 0.99 ratio   PTT:32.8 sec    INTERPRETATION OF TELEMETRY: Reviewed by me.   ECG:      RADIOLOGY & ADDITIONAL STUDIES:     CT scan: IMPRESSION: Moderate to large sized pericardial effusion. Evaluation for pericardial tamponade is limited on a non dynamic study. Recommend further evaluation with echocardiography.    Trace layering bilateral pleural effusions     ECHO FINDINGS: Date: 2019  Summary     The left ventricle is normal in size. Estimated left ventricular ejection   fraction is 65-70 %.   The left atrium is moderately dilated.   Significant fibrocalcific changes noted to the aortic valve leaflets with   restriction in leaflet excursion. Peak transaortic gradient is 26mmHg;   this finding is consistent with mild aortic stenosis.   Moderate (2+) aortic regurgitation is present.   Fibrocalcific changes noted to the mitral valve leaflets with restriction   in mitral leaflet excursion. Mean transmitral gradient is 15 mmHg; this   is   finding is consistent with severe mitral stenosis. Mild mitral   regurgitation noted. Impaired relaxation noted.   Moderate posterior mitral annular calcification noted.   The tricuspid valve leaflets are well seen and appear thin and pliable   with preserved leaflets excursion. Mild tricuspid regurgitation noted.   Moderate pulmonary hypertension is suggested.   Severe pulmonary hypertension.   The pulmonic valve is not well seen. Trace to mild pulmonic regurgitation   noted.   Moderate to large circumferential pericardial effusion noted without echo   evidence for tamponade.

## 2019-08-27 NOTE — CONSULT NOTE ADULT - ATTENDING COMMENTS
I saw and examined Mrs. Granados.   She felt well until June, she had a tick bite. Presented with SOB to outside hospital.   She had pericardial effusion and heart block. PPM was inserted and pericardial window was utilized to drain the fluid.   She tells me that serology for lyme was negative. She now has a pacemaker and has reaccumulated pericardial fluid. TTE is pending but she is not in tamponade clinically.   e need to find the etiology for pericardial effusion and heart block which can be related to tick borne disease. Organisms such as ehrlichia and babesia can present similarly.   I asked CTU staff to ask Id for their input to exclude tick borne disease. An empiric course of doxycycline maybe helpful as well if ID agrees .  I will review her echocardiogram.   Check PPD as well.  Start treatment with colchicine 0.6 mg bid and ibuprofen 600 mg q8h. PPI prophylaxis as well as close monitoring of renal function.   I will follow with you. I saw and examined Mrs. Granados.   She felt well until June, she had a tick bite. Presented with SOB to outside hospital.   She had pericardial effusion and heart block. PPM was inserted and pericardial window was utilized to drain the fluid.   She tells me that serology for lyme was negative. She now has a pacemaker and has reaccumulated pericardial fluid. TTE is pending but she is not in tamponade clinically.   e need to find the etiology for pericardial effusion and heart block which can be related to tick borne disease. Organisms such as ehrlichia and babesia can present similarly.   I asked CTU staff to ask Id for their input to exclude tick borne disease. She had negative Ehrlichia, babesia and lyme titer on prior admission. It maybe necessary to repeat these now. An empiric course of doxycycline maybe helpful as well if ID agrees .  I will review her echocardiogram.   Check PPD as well.  Start treatment with colchicine 0.6 mg bid and ibuprofen 600 mg q8h. PPI prophylaxis as well as close monitoring of renal function.   I will follow with you.

## 2019-08-28 LAB
ANION GAP SERPL CALC-SCNC: 11 MMOL/L — SIGNIFICANT CHANGE UP (ref 5–17)
APPEARANCE UR: CLEAR — SIGNIFICANT CHANGE UP
BACTERIA # UR AUTO: ABNORMAL
BILIRUB UR-MCNC: NEGATIVE — SIGNIFICANT CHANGE UP
BUN SERPL-MCNC: 27 MG/DL — HIGH (ref 8–20)
CALCIUM SERPL-MCNC: 9.4 MG/DL — SIGNIFICANT CHANGE UP (ref 8.6–10.2)
CHLORIDE SERPL-SCNC: 102 MMOL/L — SIGNIFICANT CHANGE UP (ref 98–107)
CK SERPL-CCNC: 44 U/L — SIGNIFICANT CHANGE UP (ref 25–170)
CO2 SERPL-SCNC: 26 MMOL/L — SIGNIFICANT CHANGE UP (ref 22–29)
COLOR SPEC: YELLOW — SIGNIFICANT CHANGE UP
CREAT SERPL-MCNC: 1.09 MG/DL — SIGNIFICANT CHANGE UP (ref 0.5–1.3)
DIFF PNL FLD: NEGATIVE — SIGNIFICANT CHANGE UP
EPI CELLS # UR: SIGNIFICANT CHANGE UP
GLUCOSE SERPL-MCNC: 118 MG/DL — HIGH (ref 70–115)
GLUCOSE UR QL: NEGATIVE MG/DL — SIGNIFICANT CHANGE UP
HBA1C BLD-MCNC: 5.6 % — SIGNIFICANT CHANGE UP (ref 4–5.6)
HCT VFR BLD CALC: 34.9 % — SIGNIFICANT CHANGE UP (ref 34.5–45)
HGB BLD-MCNC: 11.1 G/DL — LOW (ref 11.5–15.5)
KETONES UR-MCNC: NEGATIVE — SIGNIFICANT CHANGE UP
LEUKOCYTE ESTERASE UR-ACNC: ABNORMAL
MAGNESIUM SERPL-MCNC: 2.3 MG/DL — SIGNIFICANT CHANGE UP (ref 1.6–2.6)
MCHC RBC-ENTMCNC: 29.3 PG — SIGNIFICANT CHANGE UP (ref 27–34)
MCHC RBC-ENTMCNC: 31.8 GM/DL — LOW (ref 32–36)
MCV RBC AUTO: 92.1 FL — SIGNIFICANT CHANGE UP (ref 80–100)
NITRITE UR-MCNC: NEGATIVE — SIGNIFICANT CHANGE UP
PH UR: 5 — SIGNIFICANT CHANGE UP (ref 5–8)
PLATELET # BLD AUTO: 172 K/UL — SIGNIFICANT CHANGE UP (ref 150–400)
POTASSIUM SERPL-MCNC: 3.5 MMOL/L — SIGNIFICANT CHANGE UP (ref 3.5–5.3)
POTASSIUM SERPL-SCNC: 3.5 MMOL/L — SIGNIFICANT CHANGE UP (ref 3.5–5.3)
PREALB SERPL-MCNC: 14 MG/DL — LOW (ref 18–38)
PROT UR-MCNC: 15 MG/DL
RBC # BLD: 3.79 M/UL — LOW (ref 3.8–5.2)
RBC # FLD: 13.2 % — SIGNIFICANT CHANGE UP (ref 10.3–14.5)
RBC CASTS # UR COMP ASSIST: SIGNIFICANT CHANGE UP /HPF (ref 0–4)
SODIUM SERPL-SCNC: 139 MMOL/L — SIGNIFICANT CHANGE UP (ref 135–145)
SP GR SPEC: 1.01 — SIGNIFICANT CHANGE UP (ref 1.01–1.02)
T3 SERPL-MCNC: 80 NG/DL — SIGNIFICANT CHANGE UP (ref 80–200)
T4 AB SER-ACNC: 8 UG/DL — SIGNIFICANT CHANGE UP (ref 4.5–12)
TROPONIN T SERPL-MCNC: <0.01 NG/ML — SIGNIFICANT CHANGE UP (ref 0–0.06)
TSH SERPL-MCNC: 1.13 UIU/ML — SIGNIFICANT CHANGE UP (ref 0.27–4.2)
UROBILINOGEN FLD QL: NEGATIVE MG/DL — SIGNIFICANT CHANGE UP
WBC # BLD: 8.49 K/UL — SIGNIFICANT CHANGE UP (ref 3.8–10.5)
WBC # FLD AUTO: 8.49 K/UL — SIGNIFICANT CHANGE UP (ref 3.8–10.5)
WBC UR QL: SIGNIFICANT CHANGE UP

## 2019-08-28 PROCEDURE — 99233 SBSQ HOSP IP/OBS HIGH 50: CPT

## 2019-08-28 PROCEDURE — 99024 POSTOP FOLLOW-UP VISIT: CPT

## 2019-08-28 PROCEDURE — 71045 X-RAY EXAM CHEST 1 VIEW: CPT | Mod: 26

## 2019-08-28 RX ORDER — PANTOPRAZOLE SODIUM 20 MG/1
40 TABLET, DELAYED RELEASE ORAL
Refills: 0 | Status: DISCONTINUED | OUTPATIENT
Start: 2019-08-28 | End: 2019-09-02

## 2019-08-28 RX ADMIN — SODIUM CHLORIDE 3 MILLILITER(S): 9 INJECTION INTRAMUSCULAR; INTRAVENOUS; SUBCUTANEOUS at 14:05

## 2019-08-28 RX ADMIN — Medication 600 MILLIGRAM(S): at 06:00

## 2019-08-28 RX ADMIN — Medication 0.6 MILLIGRAM(S): at 17:21

## 2019-08-28 RX ADMIN — SODIUM CHLORIDE 3 MILLILITER(S): 9 INJECTION INTRAMUSCULAR; INTRAVENOUS; SUBCUTANEOUS at 20:56

## 2019-08-28 RX ADMIN — PANTOPRAZOLE SODIUM 40 MILLIGRAM(S): 20 TABLET, DELAYED RELEASE ORAL at 14:08

## 2019-08-28 RX ADMIN — Medication 600 MILLIGRAM(S): at 14:08

## 2019-08-28 RX ADMIN — ATORVASTATIN CALCIUM 40 MILLIGRAM(S): 80 TABLET, FILM COATED ORAL at 21:01

## 2019-08-28 RX ADMIN — Medication 0.6 MILLIGRAM(S): at 06:00

## 2019-08-28 RX ADMIN — Medication 600 MILLIGRAM(S): at 02:46

## 2019-08-28 RX ADMIN — Medication 600 MILLIGRAM(S): at 06:02

## 2019-08-28 RX ADMIN — SODIUM CHLORIDE 3 MILLILITER(S): 9 INJECTION INTRAMUSCULAR; INTRAVENOUS; SUBCUTANEOUS at 06:02

## 2019-08-28 RX ADMIN — Medication 600 MILLIGRAM(S): at 21:00

## 2019-08-28 RX ADMIN — Medication 600 MILLIGRAM(S): at 21:30

## 2019-08-28 NOTE — PROGRESS NOTE ADULT - ASSESSMENT
74 year old female with PMH HTN, HLD, L THR, bilat breast lumpectomy (declined further treatments per son), asthma, tick bite mid June 2019 (testing negative per son), 2nd degree heart block, s/p PPM insertion and pericardial window w/ biopsy (etiology unclear, negative for malignancy) on 7/5/19, ABIOLA during July 2019 admission.  Pt went to ED for evaluation d/t CP starting at approx 1130am described as feels like a heavy weight, is 8/10 (and unrelieved by morphine given at [facility] prior to transfer to [facility]), "heavy feeling" mostly to left back/subscapula region. Lying flat exacerbates the pain. + exercise intolerance (ex: gets winded when showering over past few days). + HA (7/10, "pressure", worse when lifting something). Denies photophobia, blurry or double vision, syncope, trauma, neck pain or stiffness, numbness/tingling, fever/chills, palpitations, diaphoresis, N/V, abd pain, other c/o. (26 Aug 2019 23:17)    Repeat echo at [facility] reveals pericardial effusion, no tamponade physiology.  Plan for OR on Thursday, August 29 with Dr. Whitaker.

## 2019-08-28 NOTE — PROGRESS NOTE ADULT - SUBJECTIVE AND OBJECTIVE BOX
CHIEF COMPLAINT:Patient is a 74y old  Female who presents with a chief complaint of pericardial effusion with tampoande physiology on echo (28 Aug 2019 00:17)    INTERVAL HISTORY:  pt seen and examined.   she is feeling better but still SOB with exertion.  No tamponade clinically  on motrin and colchicine  ID input appreciated. labs reviewed as well.   No events on tele.   Cr is normalized now.     Allergies:  No Known Drug Allergies  Poison Ivy (Unknown)  	  MEDICATIONS:  ibuprofen  Tablet. 600 milliGRAM(s) Oral every 8 hours  pantoprazole    Tablet 40 milliGRAM(s) Oral before breakfast  polyethylene glycol 3350 17 Gram(s) Oral daily  atorvastatin 40 milliGRAM(s) Oral at bedtime  colchicine 0.6 milliGRAM(s) Oral two times a day  sodium chloride 0.9% lock flush 3 milliLiter(s) IV Push every 8 hours    PHYSICAL EXAM:  T(C): 36.6 (08-28-19 @ 08:00), Max: 37.2 (08-27-19 @ 14:00)  HR: 82 (08-28-19 @ 08:00) (67 - 83)  BP: 140/66 (08-28-19 @ 08:00) (112/57 - 146/65)  RR: 20 (08-28-19 @ 08:00) (14 - 20)  SpO2: 98% (08-28-19 @ 08:00) (94% - 100%)  I&O's Summary    27 Aug 2019 07:01  -  28 Aug 2019 07:00  --------------------------------------------------------  IN: 350 mL / OUT: 1300 mL / NET: -950 mL    28 Aug 2019 07:01  -  28 Aug 2019 11:19  --------------------------------------------------------  IN: 600 mL / OUT: 100 mL / NET: 500 mL    Appearance: Normal	  HEENT:   NC/AT  Eye: Pink Conjunctiva  Lungs: CTA B/L  CVS: RRR, Normal S1 and S2, No Edema, no JVD  Pulses: Normal distal pulses.  Neuro: A&O x3    LABS:	 	                      11.1   8.49  )-----------( 172      ( 28 Aug 2019 05:40 )             34.9     08-28    139  |  102  |  27.0<H>  ----------------------------<  118<H>  3.5   |  26.0  |  1.09    Ca    9.4      28 Aug 2019 05:40  Mg     2.3     08-28    TPro  8.2  /  Alb  4.3  /  TBili  0.6  /  DBili  x   /  AST  24  /  ALT  30  /  AlkPhos  122<H>  08-26    proBNP:   Lipid Profile:   HgA1c: Hemoglobin A1C, Whole Blood: 5.6 % (08-28 @ 06:47)    TSH: Thyroid Stimulating Hormone, Serum: 1.13 uIU/mL (08-28 @ 05:40)      ASSESSMENT/PLAN: 	    I spoke to Dr. Whitaker and tried to call pt's son but his mail box was full  In absence of significant symptoms or tamponade, pericardiocentesis is used as diagnostic tool. Since pt had pericardial biopsy as well as evaluation of pericardial fluid, there is no need for repeating diagnostic testing at this time.   Serology and other labs are pending.  she is not clinically in tamponade and her symptoms are improved as well.  Check HIV serology  Plan to repeat TTE on [place]

## 2019-08-28 NOTE — PROGRESS NOTE ADULT - PROBLEM SELECTOR PLAN 1
Patient is hemodynamically stable at this time  Not exhibiting tamponade physiology   Last ECHO reveal  EF>75%.  Mildly increased LV wall thickness. Normal left ventricular internal cavity size. Large pericardial effusion, no echo/doppler evidence of tamponade physiology. moderate mitral stenosis.  No surgical intervention at this time given she is stable clinically.   Patient started on diet, d/c maintenance fluid   Patient being worked up for babesiosis/TB/tick born illnesses per ID because of recent tick bite  July 5th had pericardial flluid culture neg, AFB neg   Pericardial biopsy negative   Hepatitis panel neg  Tick borne disease all neg (Ehrlichia, anaplasma, lyme, babesia) from prior studies  Autoimmune work up so far neg (FARIDA, dsDNA, scleroderma neg and RF minimally high)Viral etiology and autoimmune is more likely.   Lyme IgG/IgM panel resent (more than 5 IgG or more than 2 IgM is a positive test)  Quantiferon resent, even though in [facility] rate of LTBI is very high.   No antibiotics recommended at this time as per ID

## 2019-08-28 NOTE — PROGRESS NOTE ADULT - SUBJECTIVE AND OBJECTIVE BOX
Subjective:  73yo F walking in room, has mild c/o left flank/back pain that is improved with Motrin.        HPI:  74 year old female with PMH HTN, HLD, L THR, bilat breast lumpectomy (declined further treatments per son), asthma, tick bite mid June 2019 (testing negative per son), 2nd degree heart block, s/p PPM insertion and pericardial window w/ biopsy (etiology unclear, negative for malignancy) on 7/5/19, ABIOLA during July 2019 admission.  Pt went to ED for evaluation d/t CP starting at approx 1130am described as feels like a heavy weight, is 8/10 (and unrelieved by morphine given at [facility] prior to transfer to [facility]), "heavy feeling" mostly to left back/subscapula region. Lying flat exacerbates the pain. + exercise intolerance (ex: gets winded when showering over past few days). + HA (7/10, "pressure", worse when lifting something). Denies photophobia, blurry or double vision, syncope, trauma, neck pain or stiffness, numbness/tingling, fever/chills, palpitations, diaphoresis, N/V, abd pain, other c/o. (26 Aug 2019 23:17)          PAST MEDICAL & SURGICAL HISTORY:  History of second degree heart block  Pericardial effusion  Right knee pain, unspecified chronicity  High cholesterol  Mild intermittent asthma, unspecified whether complicated  History of lumpectomy: left and right breast  History of total left hip replacement  Hypertension, unspecified type  H/O total hip arthroplasty, left  S/P pericardial window creation  Cardiac pacemaker          MEDICATIONS  (STANDING):  atorvastatin 40 milliGRAM(s) Oral at bedtime  colchicine 0.6 milliGRAM(s) Oral two times a day  ibuprofen  Tablet. 600 milliGRAM(s) Oral every 8 hours  polyethylene glycol 3350 17 Gram(s) Oral daily  sodium chloride 0.9% lock flush 3 milliLiter(s) IV Push every 8 hours    MEDICATIONS  (PRN):          Allergies    No Known Drug Allergies  Poison Ivy (Unknown)    Intolerances          WEIGHTS:  Daily Height in cm: 157.48 (27 Aug 2019 04:00)    Daily   Admit Wt: Drug Dosing Weight  Height (cm): 157.5 (27 Aug 2019 04:00)  Weight (kg): 71.6 (27 Aug 2019 04:00)  BMI (kg/m2): 28.9 (27 Aug 2019 04:00)  BSA (m2): 1.73 (27 Aug 2019 04:00)  I&O's Summary    27 Aug 2019 07:01  -  28 Aug 2019 00:17  --------------------------------------------------------  IN: 350 mL / OUT: 800 mL / NET: -450 mL        VITAL SIGNS:  ICU Vital Signs Last 24 Hrs  T(C): 36.7 (27 Aug 2019 20:00), Max: 37.2 (27 Aug 2019 14:00)  T(F): 98.1 (27 Aug 2019 20:00), Max: 99 (27 Aug 2019 14:00)  HR: 80 (27 Aug 2019 20:00) (67 - 83)  BP: 145/67 (27 Aug 2019 20:00) (124/61 - 164/74)  BP(mean): 96 (27 Aug 2019 20:00) (87 - 107)  ABP: --  ABP(mean): --  RR: 17 (27 Aug 2019 16:00) (14 - 20)  SpO2: 94% (27 Aug 2019 20:00) (94% - 99%)        All laboratory results, radiology and medications reviewed.    LABS:  08-27    139  |  100  |  28.0<H>  ----------------------------<  171<H>  3.8   |  26.0  |  1.05    Ca    9.9      27 Aug 2019 07:55  Mg     2.0     08-27    TPro  8.2  /  Alb  4.3  /  TBili  0.6  /  DBili  x   /  AST  24  /  ALT  30  /  AlkPhos  122<H>  08-26                                 12.1   12.70 )-----------( 192      ( 27 Aug 2019 07:55 )             37.8          PT/INR - ( 26 Aug 2019 17:05 )   PT: 11.0 sec;   INR: 0.99 ratio         PTT - ( 26 Aug 2019 17:05 )  PTT:32.8 sec      CARDIAC MARKERS ( 26 Aug 2019 17:05 )  <0.015 ng/mL / x     / x     / x     / x          CAPILLARY BLOOD GLUCOSE      < from: TTE Echo Complete w/Doppler (08.27.19 @ 08:37) >  Summary:   1. Left ventricular ejection fraction, by visual estimation, is >75%.   2. Technically adequate study.   3. Hyperdynamic global left ventricular systolic function.   4. Mildly increased LV wall thickness.   5. Normal left ventricular internal cavity size.   6. Large pericardial effusion.   7. No echo/doppler evidence of tamponade physiology.   8. Severe mitralannular calcification.   9. Mitral valve mean gradient is 7.5 mmHg consistent with moderate   mitral stenosis.  10. Peak transaortic gradient equals 15.7 mmHg, mean transaortic gradient   equals 8.6 mmHg, the calculated aortic valve area equals 1.34 cm² by the   continuity equation consistent with moderate aortic stenosis.    MD Max Electronically signed on 8/27/2019 at 11:16:44 AM    < end of copied text >             PHYSICAL EXAM:  General:  well nourished, no acute distress  Neurology:  alert and oriented X 4, nonfocal, no gross deficits  Respiratory:  clear to auscultation bilaterally  CV:  regular rate and rhythm, normal S1 S2  Abdomen:  soft, nontender, nondistended, positive bowel sounds  Extremities:  warm, well perfused, no edema +DP pulses

## 2019-08-29 LAB
ANION GAP SERPL CALC-SCNC: 12 MMOL/L — SIGNIFICANT CHANGE UP (ref 5–17)
B BURGDOR C6 AB SER-ACNC: NEGATIVE — SIGNIFICANT CHANGE UP
B BURGDOR IGG+IGM SER-ACNC: 0.03 INDEX — SIGNIFICANT CHANGE UP (ref 0.01–0.89)
BUN SERPL-MCNC: 32 MG/DL — HIGH (ref 8–20)
CALCIUM SERPL-MCNC: 9.2 MG/DL — SIGNIFICANT CHANGE UP (ref 8.6–10.2)
CHLORIDE SERPL-SCNC: 103 MMOL/L — SIGNIFICANT CHANGE UP (ref 98–107)
CO2 SERPL-SCNC: 25 MMOL/L — SIGNIFICANT CHANGE UP (ref 22–29)
CREAT SERPL-MCNC: 1.21 MG/DL — SIGNIFICANT CHANGE UP (ref 0.5–1.3)
GLUCOSE SERPL-MCNC: 118 MG/DL — HIGH (ref 70–115)
HCT VFR BLD CALC: 36.3 % — SIGNIFICANT CHANGE UP (ref 34.5–45)
HGB BLD-MCNC: 11.4 G/DL — LOW (ref 11.5–15.5)
MAGNESIUM SERPL-MCNC: 2.3 MG/DL — SIGNIFICANT CHANGE UP (ref 1.6–2.6)
MCHC RBC-ENTMCNC: 28.9 PG — SIGNIFICANT CHANGE UP (ref 27–34)
MCHC RBC-ENTMCNC: 31.4 GM/DL — LOW (ref 32–36)
MCV RBC AUTO: 91.9 FL — SIGNIFICANT CHANGE UP (ref 80–100)
PHOSPHATE SERPL-MCNC: 2.5 MG/DL — SIGNIFICANT CHANGE UP (ref 2.4–4.7)
PLATELET # BLD AUTO: 177 K/UL — SIGNIFICANT CHANGE UP (ref 150–400)
POTASSIUM SERPL-MCNC: 3.9 MMOL/L — SIGNIFICANT CHANGE UP (ref 3.5–5.3)
POTASSIUM SERPL-SCNC: 3.9 MMOL/L — SIGNIFICANT CHANGE UP (ref 3.5–5.3)
RBC # BLD: 3.95 M/UL — SIGNIFICANT CHANGE UP (ref 3.8–5.2)
RBC # FLD: 13.1 % — SIGNIFICANT CHANGE UP (ref 10.3–14.5)
SODIUM SERPL-SCNC: 140 MMOL/L — SIGNIFICANT CHANGE UP (ref 135–145)
WBC # BLD: 7.59 K/UL — SIGNIFICANT CHANGE UP (ref 3.8–10.5)
WBC # FLD AUTO: 7.59 K/UL — SIGNIFICANT CHANGE UP (ref 3.8–10.5)

## 2019-08-29 PROCEDURE — 99232 SBSQ HOSP IP/OBS MODERATE 35: CPT

## 2019-08-29 PROCEDURE — 99024 POSTOP FOLLOW-UP VISIT: CPT

## 2019-08-29 PROCEDURE — 71045 X-RAY EXAM CHEST 1 VIEW: CPT | Mod: 26

## 2019-08-29 RX ORDER — COLCHICINE 0.6 MG
0.6 TABLET ORAL DAILY
Refills: 0 | Status: DISCONTINUED | OUTPATIENT
Start: 2019-08-29 | End: 2019-09-01

## 2019-08-29 RX ORDER — MAGNESIUM SULFATE 500 MG/ML
2 VIAL (ML) INJECTION ONCE
Refills: 0 | Status: COMPLETED | OUTPATIENT
Start: 2019-08-29 | End: 2019-08-29

## 2019-08-29 RX ORDER — ONDANSETRON 8 MG/1
4 TABLET, FILM COATED ORAL ONCE
Refills: 0 | Status: COMPLETED | OUTPATIENT
Start: 2019-08-29 | End: 2019-08-29

## 2019-08-29 RX ADMIN — PANTOPRAZOLE SODIUM 40 MILLIGRAM(S): 20 TABLET, DELAYED RELEASE ORAL at 05:33

## 2019-08-29 RX ADMIN — Medication 600 MILLIGRAM(S): at 05:33

## 2019-08-29 RX ADMIN — SODIUM CHLORIDE 3 MILLILITER(S): 9 INJECTION INTRAMUSCULAR; INTRAVENOUS; SUBCUTANEOUS at 12:51

## 2019-08-29 RX ADMIN — POLYETHYLENE GLYCOL 3350 17 GRAM(S): 17 POWDER, FOR SOLUTION ORAL at 22:11

## 2019-08-29 RX ADMIN — Medication 50 GRAM(S): at 12:51

## 2019-08-29 RX ADMIN — SODIUM CHLORIDE 3 MILLILITER(S): 9 INJECTION INTRAMUSCULAR; INTRAVENOUS; SUBCUTANEOUS at 05:32

## 2019-08-29 RX ADMIN — Medication 600 MILLIGRAM(S): at 22:12

## 2019-08-29 RX ADMIN — Medication 0.6 MILLIGRAM(S): at 05:34

## 2019-08-29 RX ADMIN — SODIUM CHLORIDE 3 MILLILITER(S): 9 INJECTION INTRAMUSCULAR; INTRAVENOUS; SUBCUTANEOUS at 22:10

## 2019-08-29 RX ADMIN — ATORVASTATIN CALCIUM 40 MILLIGRAM(S): 80 TABLET, FILM COATED ORAL at 22:11

## 2019-08-29 RX ADMIN — Medication 600 MILLIGRAM(S): at 12:55

## 2019-08-29 RX ADMIN — ONDANSETRON 4 MILLIGRAM(S): 8 TABLET, FILM COATED ORAL at 19:19

## 2019-08-29 RX ADMIN — Medication 600 MILLIGRAM(S): at 22:11

## 2019-08-29 RX ADMIN — TUBERCULIN PURIFIED PROTEIN DERIVATIVE 5 UNIT(S): 5 INJECTION, SOLUTION INTRADERMAL at 08:20

## 2019-08-29 RX ADMIN — Medication 600 MILLIGRAM(S): at 14:32

## 2019-08-29 NOTE — PROGRESS NOTE ADULT - SUBJECTIVE AND OBJECTIVE BOX
Subjective:  Denies CP, SOB, palpitations, N/V, other c/o.    T(C): 36.9 (08-29-19 @ 09:30), Max: 36.9 (08-29-19 @ 09:30)  HR: 79 (08-29-19 @ 09:30) (70 - 88)  BP: 122/65 (08-29-19 @ 09:30) (122/65 - 144/64)  ABP: --  ABP(mean): --  RR: 18 (08-29-19 @ 09:30) (18 - 18)  SpO2: 98% (08-29-19 @ 09:30) (96% - 98%)  Wt(kg): --  CVP(mm Hg): --  CO: --  CI: --  PA: --       I&O's Detail    28 Aug 2019 07:01  -  29 Aug 2019 07:00  --------------------------------------------------------  IN:    Oral Fluid: 1440 mL  Total IN: 1440 mL    OUT:    Voided: 700 mL  Total OUT: 700 mL    Total NET: 740 mL      29 Aug 2019 07:01  -  29 Aug 2019 12:13  --------------------------------------------------------  IN:  Total IN: 0 mL    OUT:    Voided: 300 mL  Total OUT: 300 mL    Total NET: -300 mL          LABS: All Lab data reviewed and analyzed                        11.4   7.59  )-----------( 177      ( 29 Aug 2019 06:21 )             36.3     08-29    140  |  103  |  32.0<H>  ----------------------------<  118<H>  3.9   |  25.0  |  1.21    Ca    9.2      29 Aug 2019 06:21  Phos  2.5     08-29  Mg     2.3     08-29              CAPILLARY BLOOD GLUCOSE               RADIOLOGY: - Reviewed and analyzed   CXR: pending    HOSPITAL MEDICATIONS: All medications reviewed and analyzed  MEDICATIONS  (STANDING):  atorvastatin 40 milliGRAM(s) Oral at bedtime  colchicine 0.6 milliGRAM(s) Oral daily  ibuprofen  Tablet. 600 milliGRAM(s) Oral every 8 hours  magnesium sulfate  IVPB 2 Gram(s) IV Intermittent once  pantoprazole    Tablet 40 milliGRAM(s) Oral before breakfast  polyethylene glycol 3350 17 Gram(s) Oral daily  sodium chloride 0.9% lock flush 3 milliLiter(s) IV Push every 8 hours    MEDICATIONS  (PRN):              Case including assessment/plan of care discussed with   CT surgery attending.  Care time:   35   minutes of noncontinuos critical care time spent evaluating/treating, reviewing imaging, labs, discussing case with multidisciplinary team, discussing plans/goals of care with patient/family to prevent further life threatening depreciation of the patient's condition. Non-inclusive is procedure time.       74yFemale with PMH         PAST MEDICAL & SURGICAL HISTORY:  History of second degree heart block  Pericardial effusion  Right knee pain, unspecified chronicity  High cholesterol  Mild intermittent asthma, unspecified whether complicated  History of lumpectomy: left and right breast  History of total left hip replacement  Hypertension, unspecified type  H/O total hip arthroplasty, left  S/P pericardial window creation  Cardiac pacemaker Subjective:  Denies CP, SOB, palpitations, N/V, other c/o.    T(C): 36.9 (08-29-19 @ 09:30), Max: 36.9 (08-29-19 @ 09:30)  HR: 79 (08-29-19 @ 09:30) (70 - 88)  BP: 122/65 (08-29-19 @ 09:30) (122/65 - 144/64)  ABP: --  ABP(mean): --  RR: 18 (08-29-19 @ 09:30) (18 - 18)  SpO2: 98% (08-29-19 @ 09:30) (96% - 98%)  Wt(kg): --  CVP(mm Hg): --  CO: --  CI: --  PA: --       I&O's Detail    28 Aug 2019 07:01  -  29 Aug 2019 07:00  --------------------------------------------------------  IN:    Oral Fluid: 1440 mL  Total IN: 1440 mL    OUT:    Voided: 700 mL  Total OUT: 700 mL    Total NET: 740 mL      29 Aug 2019 07:01  -  29 Aug 2019 12:13  --------------------------------------------------------  IN:  Total IN: 0 mL    OUT:    Voided: 300 mL  Total OUT: 300 mL    Total NET: -300 mL          LABS: All Lab data reviewed and analyzed                        11.4   7.59  )-----------( 177      ( 29 Aug 2019 06:21 )             36.3     08-29    140  |  103  |  32.0<H>  ----------------------------<  118<H>  3.9   |  25.0  |  1.21    Ca    9.2      29 Aug 2019 06:21  Phos  2.5     08-29  Mg     2.3     08-29              CAPILLARY BLOOD GLUCOSE               RADIOLOGY: - Reviewed and analyzed   CXR: pending    HOSPITAL MEDICATIONS: All medications reviewed and analyzed  MEDICATIONS  (STANDING):  atorvastatin 40 milliGRAM(s) Oral at bedtime  colchicine 0.6 milliGRAM(s) Oral daily  ibuprofen  Tablet. 600 milliGRAM(s) Oral every 8 hours  magnesium sulfate  IVPB 2 Gram(s) IV Intermittent once  pantoprazole    Tablet 40 milliGRAM(s) Oral before breakfast  polyethylene glycol 3350 17 Gram(s) Oral daily  sodium chloride 0.9% lock flush 3 milliLiter(s) IV Push every 8 hours    MEDICATIONS  (PRN):    Neuro: A+O x 3, non-focal, speech clear and intact  HEENT: PERRL, EOMI, oral mucosa pink and moist  Neck: supple, no JVD  CV: regular rate, regular rhythm, +S1S2, no murmurs or rub  Pulm/chest: lung sounds CTA and equal bilaterally, no accessory muscle use noted  Abd: soft, NT, ND, +BS  Ext: GIANG x 4, no C/C/E  Skin: warm, well perfused           Case including assessment/plan of care discussed with   CT surgery attending.  Care time:   35   minutes of noncontinuos critical care time spent evaluating/treating, reviewing imaging, labs, discussing case with multidisciplinary team, discussing plans/goals of care with patient/family to prevent further life threatening depreciation of the patient's condition. Non-inclusive is procedure time.       74yFemale with PMH         PAST MEDICAL & SURGICAL HISTORY:  History of second degree heart block  Pericardial effusion  Right knee pain, unspecified chronicity  High cholesterol  Mild intermittent asthma, unspecified whether complicated  History of lumpectomy: left and right breast  History of total left hip replacement  Hypertension, unspecified type  H/O total hip arthroplasty, left  S/P pericardial window creation  Cardiac pacemaker

## 2019-08-29 NOTE — PROGRESS NOTE ADULT - SUBJECTIVE AND OBJECTIVE BOX
[facility]  INFECTIOUS DISEASES AND INTERNAL MEDICINE at [place]  =======================================================  José Manuel Ortiz MD  Diplomates American Board of Internal Medicine and Infectious Diseases  =======================================================    [id number]  GHADA DIOR     Follow up: pericardial effusion R/O Lyme    Feels better, has KAUFFMAN. No o2 at rest. No chest pain or SOB.  No fever.     PAST MEDICAL & SURGICAL HISTORY:  History of second degree heart block  Pericardial effusion  Right knee pain, unspecified chronicity  High cholesterol  Mild intermittent asthma, unspecified whether complicated  History of lumpectomy: left and right breast  History of total left hip replacement  Hypertension, unspecified type  H/O total hip arthroplasty, left  S/P pericardial window creation  Cardiac pacemaker    Social Hx: No smoking, ETOH or drugs.     FAMILY HISTORY: brother with CAD    Allergies  No Known Drug Allergies  Poison Ivy (Unknown)    Antibiotics:  None     REVIEW OF SYSTEMS:  CONSTITUTIONAL:  No Fever or chills  HEENT:  No diplopia or blurred vision.  No sore throat or runny nose.  CARDIOVASCULAR:  No chest pain or SOB.  RESPIRATORY:  No cough, shortness of breath, PND or orthopnea.  GASTROINTESTINAL:  No nausea, vomiting or diarrhea.  GENITOURINARY:  No dysuria, frequency or urgency. No Blood in urine  MUSCULOSKELETAL:  no joint aches, no muscle pain  SKIN:  No change in skin, hair or nails.  NEUROLOGIC:  No paresthesias, fasciculations, seizures or weakness.  PSYCHIATRIC:  No disorder of thought or mood.  ENDOCRINE:  No heat or cold intolerance, polyuria or polydipsia.  HEMATOLOGICAL:  No easy bruising or bleeding.     Physical Exam:  Vital Signs Last 24 Hrs  T(C): 36.9 (29 Aug 2019 09:30), Max: 36.9 (29 Aug 2019 09:30)  T(F): 98.4 (29 Aug 2019 09:30), Max: 98.4 (29 Aug 2019 09:30)  HR: 79 (29 Aug 2019 09:30) (70 - 88)  BP: 122/65 (29 Aug 2019 09:30) (122/65 - 144/64)  BP(mean): 95 (28 Aug 2019 16:00) (95 - 95)  RR: 18 (29 Aug 2019 09:30) (18 - 18)  SpO2: 98% (29 Aug 2019 09:30) (96% - 98%)  GEN: NAD  HEENT: normocephalic and atraumatic. EOMI. PERRL.    NECK: Supple.  No lymphadenopathy   LUNGS: Clear to auscultation.  HEART: Regular rate and rhythm   ABDOMEN: Soft, nontender, and nondistended.  Positive bowel sounds.    : No CVA tenderness  EXTREMITIES: Without any cyanosis, clubbing, rash, lesions or edema.  NEUROLOGIC: grossly intact.  PSYCHIATRIC: Appropriate affect .  SKIN: No ulceration or induration present.    Labs:      140  |  103  |  32.0<H>  ----------------------------<  118<H>  3.9   |  25.0  |  1.21    Ca    9.2      29 Aug 2019 06:21  Phos  2.5       Mg     2.3                             11.4   7.59  )-----------( 177      ( 29 Aug 2019 06:21 )             36.3     Urinalysis Basic - ( 28 Aug 2019 06:23 )    Color: Yellow / Appearance: Clear / S.015 / pH: x  Gluc: x / Ketone: Negative  / Bili: Negative / Urobili: Negative mg/dL   Blood: x / Protein: 15 mg/dL / Nitrite: Negative   Leuk Esterase: Trace / RBC: 0-2 /HPF / WBC 3-5   Sq Epi: x / Non Sq Epi: Occasional / Bacteria: Occasional    CARDIAC MARKERS ( 28 Aug 2019 05:40 )  x     / <0.01 ng/mL / 44 U/L / x     / x        RECENT CULTURES:  Pericardial smear neg for AFB      ESR=95   CRP=14    All imaging and other data have been reviewed.    Assessment and Plan:   73 y/o woman with PMH HTN, Left DEBBIE, bilat breast lumpectomy, asthma, 2nd degree heart block, s/p PPM insertion and pericardial window with biopsy negative for malignancy on 19, ABIOLA during 2019 admission was admitted with chest pain and heaviness on . CT and echo showed a large pericardial effusion.  She has a tick removed from her leg so the concern was lyme disease, so ID was called for recommendation. Cardiac manifestation of lyme usually is AV node bock, cardiomyopathy and myopericarditis can happen rarely but this patinet has a good EF with negative lyme serology. She had a neg serology about one month after the tick bite that makes it highly unlikely lyme causing effusion.     Pericardial effusion  Tick bite    -  had pericardial fluid culture neg, AFB neg   - Pericardial biopsy negative   - Hepatitis panel neg  - Tick borne disease all neg (Ehrlichia, anaplasma, lyme, babesia)  - Autoimmune work up so far neg (FARIDA, dsDNA, scleroderma neg and RF minimally high)  - Viral etiology and autoimmune is more likely.   - Repeat Lyme IgG/IgM panel completely negative   - HIV, Quantiferon are pending  - No antibiotics recommended at this time.     Will sign off please call with any question or any positive test result.

## 2019-08-29 NOTE — PROGRESS NOTE ADULT - SUBJECTIVE AND OBJECTIVE BOX
CHIEF COMPLAINT:Patient is a 74y old  Female who presents with a chief complaint of pericardial effusion with tampoande physiology on echo (28 Aug 2019 11:18)    INTERVAL HISTORY:  pt seen and examined.  Tele and labs reviewed.  Doing better, has left shoulder pain. SOB improved.  ambulating. no cp.     Allergies  No Known Drug Allergies  Poison Ivy (Unknown)  	  MEDICATIONS:  ibuprofen  Tablet. 600 milliGRAM(s) Oral every 8 hours  pantoprazole    Tablet 40 milliGRAM(s) Oral before breakfast  polyethylene glycol 3350 17 Gram(s) Oral daily  atorvastatin 40 milliGRAM(s) Oral at bedtime  colchicine 0.6 milliGRAM(s) Oral two times a day  sodium chloride 0.9% lock flush 3 milliLiter(s) IV Push every 8 hours    PHYSICAL EXAM:  T(C): 36.4 (08-29-19 @ 05:24), Max: 36.9 (08-28-19 @ 12:00)  HR: 70 (08-29-19 @ 05:24) (70 - 88)  BP: 144/64 (08-29-19 @ 05:24) (136/65 - 147/67)  RR: 18 (08-29-19 @ 05:24) (18 - 20)  SpO2: 96% (08-29-19 @ 05:24) (96% - 98%)  I&O's Summary    28 Aug 2019 07:01  -  29 Aug 2019 07:00  --------------------------------------------------------  IN: 1440 mL / OUT: 700 mL / NET: 740 mL    29 Aug 2019 07:01  -  29 Aug 2019 08:14  --------------------------------------------------------  IN: 0 mL / OUT: 200 mL / NET: -200 mL    Appearance: Normal	  HEENT:   NC/AT  Eye: Pink Conjunctiva  Lungs: CTA B/L  CVS: RRR, Normal S1 and S2, No Edema, no JVD, no rub.  Pulses: Normal distal pulses.  Neuro: A&O x3    TELEMETRY: no events    LABS:	 	                        11.4   7.59  )-----------( 177      ( 29 Aug 2019 06:21 )             36.3     08-29    140  |  103  |  32.0<H>  ----------------------------<  118<H>  3.9   |  25.0  |  1.21    Ca    9.2      29 Aug 2019 06:21  Phos  2.5     08-29  Mg     2.3     08-29    ASSESSMENT/PLAN: 	    1. PPD negative.  2. Labs are pending. Added HIV to labs  3. Cont with motrin. Decrease Colchicine to 0.6 mg daily based on Crcl.   4. Repeat limited Echo in AM  5. Symptoms improving

## 2019-08-29 NOTE — PROGRESS NOTE ADULT - ASSESSMENT
74 year old female with PMH HTN, HLD, L THR, bilat breast lumpectomy (declined further treatments per son), asthma, tick bite mid June 2019 (testing negative per son), 2nd degree heart block, s/p PPM insertion and pericardial window w/ biopsy (etiology unclear, negative for malignancy) on 7/5/19, ABIOLA during July 2019 admission.  Pt went to ED for evaluation d/t CP starting at approx 1130am described as feels like a heavy weight, is 8/10 (and unrelieved by morphine given at [facility] prior to transfer to [facility]), "heavy feeling" mostly to left back/subscapula region. Lying flat exacerbates the pain. + exercise intolerance (ex: gets winded when showering over past few days). + HA (7/10, "pressure", worse when lifting something). Denies photophobia, blurry or double vision, syncope, trauma, neck pain or stiffness, numbness/tingling, fever/chills, palpitations, diaphoresis, N/V, abd pain, other c/o. (26 Aug 2019 23:17)    Repeat echo at [facility] reveals pericardial effusion, no tamponade physiology.  Repeat TTE in AM 8/30 no surgical intervention at this time.

## 2019-08-29 NOTE — PROGRESS NOTE ADULT - PROBLEM SELECTOR PLAN 1
Patient is hemodynamically stable at this time  Not exhibiting tamponade physiology   Last ECHO reveal  EF>75%.  Mildly increased LV wall thickness. Normal left ventricular internal cavity size. Large pericardial effusion, no echo/doppler evidence of tamponade physiology. moderate mitral stenosis.  No surgical intervention at this time given she is stable clinically.   Patient started on diet  Patient being worked up for babesiosis/TB/tick born illnesses per ID because of recent tick bite  July 5th had pericardial flluid culture neg, AFB neg   Pericardial biopsy negative   Hepatitis panel neg  Tick borne disease all neg (Ehrlichia, anaplasma, lyme, babesia) from prior studies  Autoimmune work up so far neg (FARIDA, dsDNA, scleroderma neg and RF minimally high)Viral etiology and autoimmune is more likely.   Lyme IgG/IgM panel resent (more than 5 IgG or more than 2 IgM is a positive test)  Quantiferon resent, even though in [facility] rate of LTBI is very high.   No antibiotics recommended at this time as per ID

## 2019-08-30 LAB
B PERT IGG+IGM PNL SER: ABNORMAL
BLD GP AB SCN SERPL QL: SIGNIFICANT CHANGE UP
COLOR FLD: ABNORMAL
FLUID INTAKE SUBSTANCE CLASS: SIGNIFICANT CHANGE UP
FLUID SEGMENTED GRANULOCYTES: 74 % — SIGNIFICANT CHANGE UP
HIV 1 & 2 AB SERPL IA.RAPID: SIGNIFICANT CHANGE UP
LYMPHOCYTES # FLD: 14 % — SIGNIFICANT CHANGE UP
MONOS+MACROS # FLD: 12 % — SIGNIFICANT CHANGE UP
RCV VOL RI: HIGH /UL (ref 0–5)
SPECIMEN SOURCE FLD: SIGNIFICANT CHANGE UP
TOTAL NUCLEATED CELL COUNT, BODY FLUID: 2504 /UL — HIGH (ref 0–5)
TUBE TYPE: SIGNIFICANT CHANGE UP

## 2019-08-30 PROCEDURE — 99024 POSTOP FOLLOW-UP VISIT: CPT

## 2019-08-30 PROCEDURE — 33010: CPT

## 2019-08-30 PROCEDURE — 99232 SBSQ HOSP IP/OBS MODERATE 35: CPT

## 2019-08-30 PROCEDURE — 99152 MOD SED SAME PHYS/QHP 5/>YRS: CPT

## 2019-08-30 PROCEDURE — 71045 X-RAY EXAM CHEST 1 VIEW: CPT | Mod: 26

## 2019-08-30 RX ORDER — AMLODIPINE BESYLATE 2.5 MG/1
5 TABLET ORAL DAILY
Refills: 0 | Status: DISCONTINUED | OUTPATIENT
Start: 2019-08-30 | End: 2019-09-02

## 2019-08-30 RX ORDER — OXYCODONE AND ACETAMINOPHEN 5; 325 MG/1; MG/1
1 TABLET ORAL EVERY 6 HOURS
Refills: 0 | Status: DISCONTINUED | OUTPATIENT
Start: 2019-08-30 | End: 2019-09-02

## 2019-08-30 RX ADMIN — Medication 0.6 MILLIGRAM(S): at 21:58

## 2019-08-30 RX ADMIN — OXYCODONE AND ACETAMINOPHEN 1 TABLET(S): 5; 325 TABLET ORAL at 18:16

## 2019-08-30 RX ADMIN — ATORVASTATIN CALCIUM 40 MILLIGRAM(S): 80 TABLET, FILM COATED ORAL at 21:58

## 2019-08-30 RX ADMIN — Medication 600 MILLIGRAM(S): at 21:58

## 2019-08-30 RX ADMIN — SODIUM CHLORIDE 3 MILLILITER(S): 9 INJECTION INTRAMUSCULAR; INTRAVENOUS; SUBCUTANEOUS at 12:30

## 2019-08-30 RX ADMIN — PANTOPRAZOLE SODIUM 40 MILLIGRAM(S): 20 TABLET, DELAYED RELEASE ORAL at 05:41

## 2019-08-30 RX ADMIN — SODIUM CHLORIDE 3 MILLILITER(S): 9 INJECTION INTRAMUSCULAR; INTRAVENOUS; SUBCUTANEOUS at 21:39

## 2019-08-30 RX ADMIN — SODIUM CHLORIDE 3 MILLILITER(S): 9 INJECTION INTRAMUSCULAR; INTRAVENOUS; SUBCUTANEOUS at 05:41

## 2019-08-30 RX ADMIN — Medication 600 MILLIGRAM(S): at 05:41

## 2019-08-30 NOTE — PROGRESS NOTE ADULT - ASSESSMENT
Patient is a 74y old  Female who presents with a chief complaint of pericardial effusion with tamponade physiology on echo   Pt presents for pericardiocentesis pt reports feeling better   s/p 500cc drainage to drainage bag   post vitals and orders   Maintain bedrest while drain in place   Possible d/c of drain if decline in drainage in am,  am labs CBC, BMP Patient is a 74y old  Female who presents with a chief complaint of pericardial effusion with tamponade physiology on echo   Pt presents for pericardiocentesis pt reports feeling better   s/p 500cc drainage to drainage bag   post vitals and orders   Maintain bedrest while drain in place   Possible d/c of drain if decline in drainage in am,  Reviewed case with Dr Mueller   NP and Cardiology attending  to access in am   am labs CBC, BMP

## 2019-08-30 NOTE — PROGRESS NOTE ADULT - ASSESSMENT
Patient is a 74y old  Female who presents with a chief complaint of pericardial effusion with tamponade physiology on echo   Pt presents for pericardiocentesis  Currently feel well appears comfortable VSS   PRE-PROCEDURE ASSESSMENT  pericardiocnetesis  -Patient seen and examined  -Labs reviewed  -Pre-procedure teaching completed with patient   -Informed consent witnessed  -Questions answered  - All pericardial labs ordered

## 2019-08-30 NOTE — PROGRESS NOTE ADULT - SUBJECTIVE AND OBJECTIVE BOX
CHIEF COMPLAINT:Patient is a 74y old  Female who presents with a chief complaint of pericardial effusion with tampoande physiology on echo (30 Aug 2019 17:17)      INTERVAL HISTORY:  pt seen and examined earlier today. Spoke with pt's son as well as Dr Whitaker.   Pt with similar amount of effusion, RV diastolic collapse more pronounced.   She still has back/shoulder pain. SOB is improved.    Allergies  No Known Drug Allergies  Poison Ivy (Unknown)      MEDICATIONS:  ibuprofen  Tablet. 600 milliGRAM(s) Oral every 8 hours  oxyCODONE    5 mG/acetaminophen 325 mG 1 Tablet(s) Oral every 6 hours PRN  pantoprazole    Tablet 40 milliGRAM(s) Oral before breakfast  polyethylene glycol 3350 17 Gram(s) Oral daily  atorvastatin 40 milliGRAM(s) Oral at bedtime  colchicine 0.6 milliGRAM(s) Oral daily  sodium chloride 0.9% lock flush 3 milliLiter(s) IV Push every 8 hours    PHYSICAL EXAM:  T(C): 36.7 (08-30-19 @ 17:13), Max: 36.7 (08-30-19 @ 10:33)  HR: 80 (08-30-19 @ 17:58) (68 - 81)  BP: 177/74 (08-30-19 @ 17:58) (125/64 - 178/103)  RR: 20 (08-30-19 @ 17:58) (16 - 20)  SpO2: 97% (08-30-19 @ 17:58) (94% - 99%)  Wt(kg): --    I&O's Summary    29 Aug 2019 07:01  -  30 Aug 2019 07:00  --------------------------------------------------------  IN: 770 mL / OUT: 1100 mL / NET: -330 mL    30 Aug 2019 07:01  -  30 Aug 2019 19:28  --------------------------------------------------------  IN: 240 mL / OUT: 800 mL / NET: -560 m    Appearance: Normal, in nad	  HEENT:   NC/AT  Eye: Pink Conjunctiva  Lungs: CTA B/L  CVS: RRR, Normal S1 and S2, No Edema  Pulses: Normal distal pulses.  Neuro: A&O x3    TELEMETRY: no events    LABS:	 	                          11.4   7.59  )-----------( 177      ( 29 Aug 2019 06:21 )             36.3     08-29    140  |  103  |  32.0<H>  ----------------------------<  118<H>  3.9   |  25.0  |  1.21      ASSESSMENT/PLAN: 	  pt underwent pericardiocentesis  cont with NSAIDs and colchicine  Monitor drain output  FU fluid studies  Add norvasc 5  mg daily for bp control   Venodynes for DVT prophylaxis, pt is ambulating as well

## 2019-08-30 NOTE — PROGRESS NOTE ADULT - SUBJECTIVE AND OBJECTIVE BOX
Subjective:  "I feel Ok "  s/p Pericardial centesis tolerated well   500 CC bloody drainage   All labs sent     Medications:  atorvastatin 40 milliGRAM(s) Oral at bedtime  colchicine 0.6 milliGRAM(s) Oral daily  ibuprofen  Tablet. 600 milliGRAM(s) Oral every 8 hours  pantoprazole    Tablet 40 milliGRAM(s) Oral before breakfast  polyethylene glycol 3350 17 Gram(s) Oral daily  sodium chloride 0.9% lock flush 3 milliLiter(s) IV Push every 8 hours      PHYSICAL EXAM:  Vital Signs Last 24 Hrs  T(C): 36.7 (30 Aug 2019 10:33), Max: 36.7 (30 Aug 2019 10:33)  T(F): 98.1 (30 Aug 2019 10:33), Max: 98.1 (30 Aug 2019 10:33)  HR: 74 (30 Aug 2019 10:33) (74 - 75)  BP: 138/82 (30 Aug 2019 10:33) (125/64 - 138/82)  RR: 18 (30 Aug 2019 10:33) (16 - 18)  SpO2: 94% (30 Aug 2019 10:33) (94% - 99%)    Daily Weight in k.1 (30 Aug 2019 05:00)  I&O's Detail    29 Aug 2019 07:  -  30 Aug 2019 07:00  --------------------------------------------------------  IN:    Oral Fluid: 720 mL    Solution: 50 mL  Total IN: 770 mL    OUT:    Voided: 1100 mL  Total OUT: 1100 mL    Total NET: -330 mL      30 Aug 2019 07:01  -  30 Aug 2019 17:17  --------------------------------------------------------  IN:    Oral Fluid: 240 mL  Total IN: 240 mL    OUT:    Voided: 800 mL  Total OUT: 800 mL    Total NET: -560 mL          General: A/ox 3, No acute Distress  Neck: Supple, NO JVD  Cardiac: S1 S2, No M/R/G  Pulmonary: CTAB, Breathing unlabored, No Rhonchi/Rales/Wheezing  Abdomen: Soft, Non -tender, +BS   Pericardial drain in place with blood drainage   Dsg clean and dry   Extremities: No Rashes, No edema  Neuro: A/o x 3, No focal deficits  Psch: normal mood , normal affect    LABS:                          11.4   7.59  )-----------( 177      ( 29 Aug 2019 06:21 )             36.3         140  |  103  |  32.0<H>  ----------------------------<  118<H>  3.9   |  25.0  |  1.21    Ca    9.2      29 Aug 2019 06:21  Phos  2.5       Mg     2.3

## 2019-08-30 NOTE — PROGRESS NOTE ADULT - SUBJECTIVE AND OBJECTIVE BOX
Subjective:  Pt in chair NAD no issues overnight     T(C): 36.7 (08-30-19 @ 10:33), Max: 36.7 (08-30-19 @ 10:33)  HR: 74 (08-30-19 @ 10:33) (74 - 76)  BP: 138/82 (08-30-19 @ 10:33) (122/73 - 138/82)  RR: 18 (08-30-19 @ 10:33) (16 - 18)  SpO2: 94% (08-30-19 @ 10:33) (94% - 100%) RA   Tele: SR      08-29    140  |  103  |  32.0<H>  ----------------------------<  118<H>  3.9   |  25.0  |  1.21    Ca    9.2      29 Aug 2019 06:21  Phos  2.5     08-29  Mg     2.3     08-29                                 11.4   7.59  )-----------( 177      ( 29 Aug 2019 06:21 )             36.3                 CAPILLARY BLOOD GLUCOSE               CXR:  < from: Xray Chest 1 View- PORTABLE-Routine (08.30.19 @ 05:43) >  Single frontal view of the chest demonstrates the lungs to be clear. The   cardiomediastinal silhouette is enlarged. No acute osseous abnormalities.   Overlying EKG leads and wires are noted. Left-sided dual-chamber   pacemaker.    IMPRESSION: No acute cardiopulmonary disease process. Cardiomegaly.    < end of copied text >          Assessment  Neuro:  alert awake NAD   Pulm:  decreased at bases b/l   CV: RRR S1 S2   Abd:  soft NT ND + BS   Extremities:  trace edema b/l         Assessment:  74yFemale    with PAST MEDICAL & SURGICAL HISTORY:  History of second degree heart block  Pericardial effusion  Right knee pain, unspecified chronicity  High cholesterol  Mild intermittent asthma, unspecified whether complicated  History of lumpectomy: left and right breast  History of total left hip replacement  Hypertension, unspecified type  H/O total hip arthroplasty, left  S/P pericardial window creation  Cardiac pacemaker

## 2019-08-30 NOTE — PROGRESS NOTE ADULT - ASSESSMENT
74 year old female with PMH HTN, HLD, L THR, bilat breast lumpectomy (declined further treatments per son), asthma, tick bite mid June 2019 (testing negative per son), 2nd degree heart block, s/p PPM insertion and pericardial window w/ biopsy (etiology unclear, negative for malignancy) on 7/5/19, ABIOLA during July 2019 admission.  Pt went to ED for evaluation d/t CP starting at approx 1130am described as feels like a heavy weight, is 8/10 (and unrelieved by morphine given at [facility] prior to transfer to [facility]), "heavy feeling" mostly to left back/subscapula region. Lying flat exacerbates the pain. + exercise intolerance (ex: gets winded when showering over past few days). + HA (7/10, "pressure", worse when lifting something). Denies photophobia, blurry or double vision, syncope, trauma, neck pain or stiffness, numbness/tingling, fever/chills, palpitations, diaphoresis, N/V, abd pain, other c/o. (26 Aug 2019 23:17)    Repeat echo at [facility] reveals pericardial effusion, no tamponade physiology.  Repeat TTE in AM 8/30 no surgical intervention at this time.

## 2019-08-30 NOTE — PROGRESS NOTE ADULT - PROBLEM SELECTOR PLAN 1
Patient is hemodynamically stable at this time  Not exhibiting tamponade physiology   Last ECHO reveal  EF>75%.  repeat ECHO complete awaiting results   Mildly increased LV wall thickness. Normal left ventricular internal cavity size. Large pericardial effusion, no echo/doppler evidence of tamponade physiology. moderate mitral stenosis.  No surgical intervention at this time given she is stable clinically.   Patient started on diet  Patient being worked up for babesiosis/TB/tick born illnesses per ID because of recent tick bite  July 5th had pericardial fluid culture neg, AFB neg   Pericardial biopsy negative   Hepatitis panel neg  Tick borne disease all neg (Ehrlichia, anaplasma, lyme, babesia) from prior studies  Autoimmune work up so far neg (FARIDA, dsDNA, scleroderma neg and RF minimally high)Viral etiology and autoimmune is more likely.   Lyme IgG/IgM panel resent (more than 5 IgG or more than 2 IgM is a positive test)  Quantiferon resent, even though in [facility] rate of LTBI is very high.   No antibiotics recommended at this time as per ID  If ECHO is unchanged, Pt can be discharged home and follow up as outpatient Patient is hemodynamically stable at this time  Not exhibiting tamponade physiology   Last ECHO reveal  EF>75%.  repeat ECHO complete  Large amount of fluid with RV strain   Pt made NPO for pericardial drainage with Dr Mueller   Mildly increased LV wall thickness. Normal left ventricular internal cavity size. Large pericardial effusion, no echo/doppler evidence of tamponade physiology. moderate mitral stenosis.  No surgical intervention at this time given she is stable clinically.   Patient started on diet  Patient being worked up for babesiosis/TB/tick born illnesses per ID because of recent tick bite  July 5th had pericardial fluid culture neg, AFB neg   Pericardial biopsy negative   Hepatitis panel neg  Tick borne disease all neg (Ehrlichia, anaplasma, lyme, babesia) from prior studies  Autoimmune work up so far neg (FARIDA, dsDNA, scleroderma neg and RF minimally high)Viral etiology and autoimmune is more likely.   Lyme IgG/IgM panel resent (more than 5 IgG or more than 2 IgM is a positive test)  Quantiferon resent, even though in [facility] rate of LTBI is very high.   No antibiotics recommended at this time as per ID  DW CT team in am rounds

## 2019-08-30 NOTE — PROGRESS NOTE ADULT - SUBJECTIVE AND OBJECTIVE BOX
Subjective:  pt presents to cath lab for pericardiocentesis     Medications:  atorvastatin 40 milliGRAM(s) Oral at bedtime  colchicine 0.6 milliGRAM(s) Oral daily  ibuprofen  Tablet. 600 milliGRAM(s) Oral every 8 hours  pantoprazole    Tablet 40 milliGRAM(s) Oral before breakfast  polyethylene glycol 3350 17 Gram(s) Oral daily  sodium chloride 0.9% lock flush 3 milliLiter(s) IV Push every 8 hours      PHYSICAL EXAM:  Vital Signs Last 24 Hrs  T(C): 36.7 (30 Aug 2019 10:33), Max: 36.7 (30 Aug 2019 10:33)  T(F): 98.1 (30 Aug 2019 10:33), Max: 98.1 (30 Aug 2019 10:33)  HR: 74 (30 Aug 2019 10:33) (74 - 75)  BP: 138/82 (30 Aug 2019 10:33) (125/64 - 138/82)  RR: 18 (30 Aug 2019 10:33) (16 - 18)  SpO2: 94% (30 Aug 2019 10:33) (94% - 99%)    Daily Weight in k.1 (30 Aug 2019 05:00)  I&O's Detail    29 Aug 2019 07:  -  30 Aug 2019 07:00  --------------------------------------------------------  IN:    Oral Fluid: 720 mL    Solution: 50 mL  Total IN: 770 mL    OUT:    Voided: 1100 mL  Total OUT: 1100 mL    Total NET: -330 mL      30 Aug 2019 07:01  -  30 Aug 2019 15:40  --------------------------------------------------------  IN:    Oral Fluid: 240 mL  Total IN: 240 mL    OUT:    Voided: 800 mL  Total OUT: 800 mL    Total NET: -560 mL          General: A/ox 3, No acute Distress  Neck: Supple, NO JVD  Cardiac: S1 S2, No M/R/G  Pulmonary: CTAB, Breathing unlabored, No Rhonchi/Rales/Wheezing  Abdomen: Soft, Non -tender, +BS   Extremities: No Rashes, No edema  Neuro: A/o x 3, No focal deficits  Psch: normal mood , normal affect    LABS:                          11.4   7.59  )-----------( 177      ( 29 Aug 2019 06:21 )             36.3     -    140  |  103  |  32.0<H>  ----------------------------<  118<H>  3.9   |  25.0  |  1.21    Ca    9.2      29 Aug 2019 06:21  Phos  2.5       Mg     2.3      Subjective:  pt presents to cath lab for pericardiocentesis     Mallampati 3  ASA 3   BRA 4.2%  GFR 43  Medications:  atorvastatin 40 milliGRAM(s) Oral at bedtime  colchicine 0.6 milliGRAM(s) Oral daily  ibuprofen  Tablet. 600 milliGRAM(s) Oral every 8 hours  pantoprazole    Tablet 40 milliGRAM(s) Oral before breakfast  polyethylene glycol 3350 17 Gram(s) Oral daily  sodium chloride 0.9% lock flush 3 milliLiter(s) IV Push every 8 hours      PHYSICAL EXAM:  Vital Signs Last 24 Hrs  T(C): 36.7 (30 Aug 2019 10:33), Max: 36.7 (30 Aug 2019 10:33)  T(F): 98.1 (30 Aug 2019 10:33), Max: 98.1 (30 Aug 2019 10:33)  HR: 74 (30 Aug 2019 10:33) (74 - 75)  BP: 138/82 (30 Aug 2019 10:33) (125/64 - 138/82)  RR: 18 (30 Aug 2019 10:33) (16 - 18)  SpO2: 94% (30 Aug 2019 10:33) (94% - 99%)  Daily Weight in k.1 (30 Aug 2019 05:00)  I&O's Detail    Total OUT: 800 mL    Total NET: -560 mL          General: A/ox 3, No acute Distress  Neck: Supple, NO JVD  Cardiac: S1 S2, No M/R/G  Pulmonary: CTAB, Breathing unlabored, No Rhonchi/Rales/Wheezing  Abdomen: Soft, Non -tender, +BS   Extremities: No Rashes, No edema  Neuro: A/o x 3, No focal deficits  Psch: normal mood , normal affect                              11.4   7.59  )-----------( 177      ( 29 Aug 2019 06:21 )             36.3     08-    140  |  103  |  32.0<H>  ----------------------------<  118<H>  3.9   |  25.0  |  1.21    Ca    9.2      29 Aug 2019 06:21  Phos  2.5     08-  Mg     2.3         PHYSICIAN INTERPRETATION:  Left Ventricle:  Global LV systolic function was hyperdynamic. Left ventricular ejection   fraction, by visual estimation, is >75%.  Pericardium: A large pericardial effusion is present. The pericardial   effusion is globally located around the entire heart. There is excessive   respiratory variation in the tricuspid valve spectral Doppler velocities.  Mitral Valve: There is severe mitral annular calcification. Peak   transmitral mean gradient equals 7.0 mmHg, calculated mitral valve area   by pressure half time equals 1.58 cm² consistent with mild mitral   stenosis. Mitral valve mean gradient is 7.0 mmHg consistent with moderate   mitral stenosis. Trace mitral valve regurgitation is seen.  Aortic Valve: Mild aortic valve regurgitation is seen.  In comparison to the previous echocardiogram(s): Prior examinations are   available and were reviewed for comparison purposes. The pericardial   effusion is unchanged. A prior echocardiogram from 2019 is available   for comparison purposes.       Summary:   1. Left ventricular ejection fraction, by visual estimation, is >75%.   2. Technically good study.   3. Hyperdynamic global left ventricular systolic function.   4. Large pericardial effusion. This is unchanged from prior echo. There   is RA/RV early diastolic collapse, no significant MV/TV respirophasic   changes, the IVC is collapsible. High suspicion for tamponade physiology.   5. Peak transmitral mean gradient equals 7.0 mmHg, calculated mitral   valve area by pressure half time equals 1.58 cm² consistent with mild   mitral stenosis.   6. Severe mitral annular calcification.   7. Mild aortic regurgitation.   8. A prior echocardiogram from 2019 is available for comparison   purposes.   9. Mitral valve mean gradient is 7.0 mmHg consistent with moderate   mitral stenosis.

## 2019-08-31 LAB
GRAM STN FLD: SIGNIFICANT CHANGE UP
HCT VFR BLD CALC: 34.4 % — LOW (ref 34.5–45)
HGB BLD-MCNC: 10.8 G/DL — LOW (ref 11.5–15.5)
MCHC RBC-ENTMCNC: 28.6 PG — SIGNIFICANT CHANGE UP (ref 27–34)
MCHC RBC-ENTMCNC: 31.4 GM/DL — LOW (ref 32–36)
MCV RBC AUTO: 91.2 FL — SIGNIFICANT CHANGE UP (ref 80–100)
PLATELET # BLD AUTO: 232 K/UL — SIGNIFICANT CHANGE UP (ref 150–400)
RBC # BLD: 3.77 M/UL — LOW (ref 3.8–5.2)
RBC # FLD: 12.9 % — SIGNIFICANT CHANGE UP (ref 10.3–14.5)
SPECIMEN SOURCE: SIGNIFICANT CHANGE UP
WBC # BLD: 4.28 K/UL — SIGNIFICANT CHANGE UP (ref 3.8–10.5)
WBC # FLD AUTO: 4.28 K/UL — SIGNIFICANT CHANGE UP (ref 3.8–10.5)

## 2019-08-31 PROCEDURE — 99232 SBSQ HOSP IP/OBS MODERATE 35: CPT

## 2019-08-31 PROCEDURE — 99024 POSTOP FOLLOW-UP VISIT: CPT

## 2019-08-31 RX ADMIN — Medication 0.6 MILLIGRAM(S): at 09:10

## 2019-08-31 RX ADMIN — Medication 600 MILLIGRAM(S): at 06:12

## 2019-08-31 RX ADMIN — ATORVASTATIN CALCIUM 40 MILLIGRAM(S): 80 TABLET, FILM COATED ORAL at 21:58

## 2019-08-31 RX ADMIN — Medication 600 MILLIGRAM(S): at 13:53

## 2019-08-31 RX ADMIN — PANTOPRAZOLE SODIUM 40 MILLIGRAM(S): 20 TABLET, DELAYED RELEASE ORAL at 06:12

## 2019-08-31 RX ADMIN — AMLODIPINE BESYLATE 5 MILLIGRAM(S): 2.5 TABLET ORAL at 06:12

## 2019-08-31 RX ADMIN — Medication 600 MILLIGRAM(S): at 14:45

## 2019-08-31 RX ADMIN — SODIUM CHLORIDE 3 MILLILITER(S): 9 INJECTION INTRAMUSCULAR; INTRAVENOUS; SUBCUTANEOUS at 13:52

## 2019-08-31 RX ADMIN — SODIUM CHLORIDE 3 MILLILITER(S): 9 INJECTION INTRAMUSCULAR; INTRAVENOUS; SUBCUTANEOUS at 06:11

## 2019-08-31 RX ADMIN — POLYETHYLENE GLYCOL 3350 17 GRAM(S): 17 POWDER, FOR SOLUTION ORAL at 09:09

## 2019-08-31 RX ADMIN — SODIUM CHLORIDE 3 MILLILITER(S): 9 INJECTION INTRAMUSCULAR; INTRAVENOUS; SUBCUTANEOUS at 21:54

## 2019-08-31 NOTE — PROGRESS NOTE ADULT - ASSESSMENT
75 yo F with progressive chest heaviness developed around noon today, pain is described as heaviness, 8/10 in severity, mostly left sided and radiating to back "under my left shoulder blade."  Admits to associated SOB and HA as well.  Symptoms become worse during exertion or when lying flat and subside with rest.  Checked in to [facility] for evaluation and CT chest and TTE confirmed "Moderate to large sized pericardial effusion  s/p pericardial drain  post echo with ivc still collapsable  for repeat cath today  Drained 100cc of serous drainage

## 2019-08-31 NOTE — PROGRESS NOTE ADULT - PROBLEM SELECTOR PLAN 1
Patient is hemodynamically stable at this time  Not exhibiting tamponade physiology   Last ECHO reveal  EF>75%.  repeat ECHO complete  Large amount of fluid with RV strain   s/p  pericardial drainage with Dr Mueller 8/30   Mildly increased LV wall thickness. Normal left ventricular internal cavity size. Large pericardial effusion, no echo/doppler evidence of tamponade physiology. moderate mitral stenosis.  Patient being worked up for babesiosis/TB/tick born illnesses per ID because of recent tick bite  July 5th had pericardial fluid culture neg, AFB neg   Pericardial biopsy negative   Hepatitis panel neg  Tick borne disease all neg (Ehrlichia, anaplasma, lyme, babesia) from prior studies  Autoimmune work up so far neg (FARIDA, dsDNA, scleroderma neg and RF minimally high)Viral etiology and autoimmune is more likely.   Lyme IgG/IgM panel resent (more than 5 IgG or more than 2 IgM is a positive test)  Quantiferon resent, even though in [facility] rate of LTBI is very high.   No antibiotics recommended at this time as per ID  DW CT team in am rounds

## 2019-08-31 NOTE — PROGRESS NOTE ADULT - PROBLEM SELECTOR PLAN 1
Drain in place  still draining will evaluated later  Repeat echo pending  c/w colchicine   w/u negative thus far

## 2019-08-31 NOTE — PROGRESS NOTE ADULT - ASSESSMENT
74 year old female with PMH HTN, HLD, L THR, bilat breast lumpectomy (declined further treatments per son), asthma, tick bite mid June 2019 (testing negative per son), 2nd degree heart block, s/p PPM insertion and pericardial window w/ biopsy (etiology unclear, negative for malignancy) on 7/5/19, ABIOLA during July 2019 admission.  Pt went to ED for evaluation d/t CP starting at approx 1130am described as feels like a heavy weight, is 8/10 (and unrelieved by morphine given at [facility] prior to transfer to [facility]), "heavy feeling" mostly to left back/subscapula region. Lying flat exacerbates the pain. + exercise intolerance (ex: gets winded when showering over past few days). + HA (7/10, "pressure", worse when lifting something). Denies photophobia, blurry or double vision, syncope, trauma, neck pain or stiffness, numbness/tingling, fever/chills, palpitations, diaphoresis, N/V, abd pain, other c/o. (26 Aug 2019 23:17)    Repeat echo at [facility] reveals pericardial effusion, no tamponade physiology.  Repeat TTE in AM 8/30 no surgical intervention at this time.

## 2019-08-31 NOTE — PROGRESS NOTE ADULT - SUBJECTIVE AND OBJECTIVE BOX
Subjective:  Pt in chair NAD.  Maintaining tube for now for high output     VITAL SIGNS  T(C): 36.8 (19 @ 11:11), Max: 36.8 (19 @ 11:11)  HR: 88 (19 @ 11:11) (64 - 88)  BP: 117/76 (19 @ 11:11) (117/76 - 178/103)  RR: 18 (19 @ 11:11) (16 - 20)  SpO2: 98% (19 @ 09:08) (94% - 99%) RA       Daily     Daily Weight in k.4 (31 Aug 2019 04:58)  Admit Wt: Drug Dosing Weight  Height (cm): 157.5 (27 Aug 2019 04:00)  Weight (kg): 71.6 (27 Aug 2019 04:00)    Telemetry:  SR   LVEF: nml    Pericardial Drain 100cc x 24 hours     MEDICATIONS  amLODIPine   Tablet 5 milliGRAM(s) Oral daily  atorvastatin 40 milliGRAM(s) Oral at bedtime  colchicine 0.6 milliGRAM(s) Oral daily  ibuprofen  Tablet. 600 milliGRAM(s) Oral every 8 hours  oxyCODONE    5 mG/acetaminophen 325 mG 1 Tablet(s) Oral every 6 hours PRN  pantoprazole    Tablet 40 milliGRAM(s) Oral before breakfast  polyethylene glycol 3350 17 Gram(s) Oral daily  sodium chloride 0.9% lock flush 3 milliLiter(s) IV Push every 8 hours    MEDICATIONS  (PRN):  oxyCODONE    5 mG/acetaminophen 325 mG 1 Tablet(s) Oral every 6 hours PRN Moderate Pain (4 - 6)        PHYSICAL EXAM  General: Alert Awake NAD   Respiratory:  decreased at bases b/l   CV: RRR S1 S2   Abdomen:  Soft NT ND + BS   Extremities:  Trace edema b/l       I&O's Detail    30 Aug 2019 07:01  -  31 Aug 2019 07:00  --------------------------------------------------------  IN:    Oral Fluid: 480 mL  Total IN: 480 mL    OUT:    Drain: 100 mL    Voided: 1600 mL  Total OUT: 1700 mL    Total NET: -1220 mL          LABS                                     10.8   4.28  )-----------( 232      ( 31 Aug 2019 06:06 )             34.4                     CAPILLARY BLOOD GLUCOSE               Today's CXR:   < from: Xray Chest 1 View- PORTABLE-Routine (19 @ 05:43) >  Single frontal view of the chest demonstrates the lungs to be clear. The   cardiomediastinal silhouette is enlarged. No acute osseous abnormalities.   Overlying EKG leads and wires are noted. Left-sided dual-chamber   pacemaker.    IMPRESSION: No acute cardiopulmonary disease process. Cardiomegaly.    < end of copied text >      PAST MEDICAL & SURGICAL HISTORY:  History of second degree heart block  Pericardial effusion  Right knee pain, unspecified chronicity  High cholesterol  Mild intermittent asthma, unspecified whether complicated  History of lumpectomy: left and right breast  History of total left hip replacement  Hypertension, unspecified type  H/O total hip arthroplasty, left  S/P pericardial window creation  Cardiac pacemaker

## 2019-08-31 NOTE — PROGRESS NOTE ADULT - SUBJECTIVE AND OBJECTIVE BOX
[facility]  [address], [place] [address]    REASON FOR VISIT:  Follow up on Pericardial effusion with tamponade physciology  UPDATE:  TELEMETRY MONITORIN-30-19 @ 07:01  -  19 @ 07:00  --------------------------------------------------------  IN: 480 mL / OUT: 1700 mL / NET: -1220 mL        MEDICATIONS  (STANDING):  amLODIPine   Tablet 5 milliGRAM(s) Oral daily  atorvastatin 40 milliGRAM(s) Oral at bedtime  colchicine 0.6 milliGRAM(s) Oral daily  ibuprofen  Tablet. 600 milliGRAM(s) Oral every 8 hours  pantoprazole    Tablet 40 milliGRAM(s) Oral before breakfast  polyethylene glycol 3350 17 Gram(s) Oral daily  sodium chloride 0.9% lock flush 3 milliLiter(s) IV Push every 8 hours    ROS:  No fever chills  Cardiac  No cp sob or palp  Resp  no cough no mucus production  Gi  no abd pain no melana  Ext No calf tenderness, no edema    Vital Signs Last 24 Hrs  T(C): 36.6 (31 Aug 2019 06:10), Max: 36.7 (30 Aug 2019 10:33)  T(F): 97.9 (31 Aug 2019 06:10), Max: 98.1 (30 Aug 2019 10:33)  HR: 73 (31 Aug 2019 09:08) (64 - 81)  BP: 151/67 (31 Aug 2019 09:08) (138/82 - 178/103)  BP(mean): --  RR: 16 (31 Aug 2019 09:08) (16 - 20)  SpO2: 98% (31 Aug 2019 09:08) (94% - 99%)  T(C): 36.6 (19 @ 06:10), Max: 36.7 (19 @ 10:33)  HR: 73 (19 @ 09:08) (64 - 81)  BP: 151/67 (19 @ 09:08) (138/82 - 178/103)  RR: 16 (08-31-19 @ 09:08) (16 - 20)  SpO2: 98% (-19 @ 09:08) (94% - 99%)    HEENT Head atraumatic eomi, oral mucosa moist  CV S1&S2      No r/g/ m   RESP    GI  Soft active bowel sounds non tender  EXT  No clubbing/Cyanosis /Edema  NEURO  Alert oriented  No gross motor or sensory deficits [facility]  [address], [place] [address]    REASON FOR VISIT:  Follow up on Pericardial effusion with tamponade physciology  UPDATE:  Feeling better, had a little cp during the night and relieved with oxycodone  Pericardial drain 100cc past shift  TELEMETRY MONITORING:  Paced     08-30-19 @ 07:01  -  08-31-19 @ 07:00  --------------------------------------------------------  IN: 480 mL / OUT: 1700 mL / NET: -1220 mL    MEDICATIONS  (STANDING):  amLODIPine   Tablet 5 milliGRAM(s) Oral daily  atorvastatin 40 milliGRAM(s) Oral at bedtime  colchicine 0.6 milliGRAM(s) Oral daily  ibuprofen  Tablet. 600 milliGRAM(s) Oral every 8 hours  pantoprazole    Tablet 40 milliGRAM(s) Oral before breakfast  polyethylene glycol 3350 17 Gram(s) Oral daily  sodium chloride 0.9% lock flush 3 milliLiter(s) IV Push every 8 hours    ROS:  No fever chills  Cardiac  No cp sob or palp  Resp  no cough no mucus production  Gi  no abd pain no melana  Ext No calf tenderness, no edema    Vital Signs Last 24 Hrs  T(C): 36.6 (31 Aug 2019 06:10), Max: 36.7 (30 Aug 2019 10:33)  T(F): 97.9 (31 Aug 2019 06:10), Max: 98.1 (30 Aug 2019 10:33)  HR: 73 (31 Aug 2019 09:08) (64 - 81)  BP: 151/67 (31 Aug 2019 09:08) (138/82 - 178/103)  BP(mean): --  RR: 16 (31 Aug 2019 09:08) (16 - 20)  SpO2: 98% (31 Aug 2019 09:08) (94% - 99%)  T(C): 36.6 (08-31-19 @ 06:10), Max: 36.7 (08-30-19 @ 10:33)  HR: 73 (08-31-19 @ 09:08) (64 - 81)  BP: 151/67 (08-31-19 @ 09:08) (138/82 - 178/103)  RR: 16 (08-31-19 @ 09:08) (16 - 20)  SpO2: 98% (08-31-19 @ 09:08) (94% - 99%)    HEENT Head atraumatic eomi, oral mucosa moist  NECK: no jvd no bruitts  CV S1&S2 regular  No rub present  RESP  CTA  GI  Soft active bowel sounds non tender  EXT  No clubbing/Cyanosis /Edema  NEURO  Alert oriented  No gross motor or sensory deficits    < from: TTE Echo Limited or F/U (08.30.19 @ 09:03) >  Summary:   1. Left ventricular ejection fraction, by visual estimation, is >75%.   2. Technically good study.   3. Hyperdynamic global left ventricular systolic function.   4. Large pericardial effusion. This is unchanged from prior echo. There   is RA/RV early diastolic collapse, no significant MV/TV respirophasic   changes, the IVC is collapsible. High suspicion for tamponade physiology.   5. Peak transmitral mean gradient equals 7.0 mmHg, calculated mitral   valve area by pressure half time equals 1.58 cm² consistent with mild   mitral stenosis.   6. Severe mitral annular calcification.   7. Mild aortic regurgitation.   8. A prior echocardiogram from 8/27/2019 is available for comparison   purposes.   9. Mitral valve mean gradient is 7.0 mmHg consistent with moderate   mitral stenosis.

## 2019-09-01 DIAGNOSIS — I10 ESSENTIAL (PRIMARY) HYPERTENSION: ICD-10-CM

## 2019-09-01 LAB
ALBUMIN FLD-MCNC: 3 G/DL — SIGNIFICANT CHANGE UP
ANION GAP SERPL CALC-SCNC: 10 MMOL/L — SIGNIFICANT CHANGE UP (ref 5–17)
BUN SERPL-MCNC: 27 MG/DL — HIGH (ref 8–20)
CALCIUM SERPL-MCNC: 9.2 MG/DL — SIGNIFICANT CHANGE UP (ref 8.6–10.2)
CHLORIDE SERPL-SCNC: 107 MMOL/L — SIGNIFICANT CHANGE UP (ref 98–107)
CO2 SERPL-SCNC: 27 MMOL/L — SIGNIFICANT CHANGE UP (ref 22–29)
CREAT SERPL-MCNC: 1.14 MG/DL — SIGNIFICANT CHANGE UP (ref 0.5–1.3)
GLUCOSE FLD-MCNC: 70 MG/DL — SIGNIFICANT CHANGE UP
GLUCOSE SERPL-MCNC: 113 MG/DL — SIGNIFICANT CHANGE UP (ref 70–115)
HCT VFR BLD CALC: 36.5 % — SIGNIFICANT CHANGE UP (ref 34.5–45)
HGB BLD-MCNC: 11.3 G/DL — LOW (ref 11.5–15.5)
LDH SERPL L TO P-CCNC: 2530 U/L — SIGNIFICANT CHANGE UP
MCHC RBC-ENTMCNC: 28.2 PG — SIGNIFICANT CHANGE UP (ref 27–34)
MCHC RBC-ENTMCNC: 31 GM/DL — LOW (ref 32–36)
MCV RBC AUTO: 91 FL — SIGNIFICANT CHANGE UP (ref 80–100)
NIGHT BLUE STAIN TISS: SIGNIFICANT CHANGE UP
PLATELET # BLD AUTO: 246 K/UL — SIGNIFICANT CHANGE UP (ref 150–400)
POTASSIUM SERPL-MCNC: 4.1 MMOL/L — SIGNIFICANT CHANGE UP (ref 3.5–5.3)
POTASSIUM SERPL-SCNC: 4.1 MMOL/L — SIGNIFICANT CHANGE UP (ref 3.5–5.3)
PROT FLD-MCNC: 5 G/DL — SIGNIFICANT CHANGE UP
RBC # BLD: 4.01 M/UL — SIGNIFICANT CHANGE UP (ref 3.8–5.2)
RBC # FLD: 12.8 % — SIGNIFICANT CHANGE UP (ref 10.3–14.5)
SODIUM SERPL-SCNC: 144 MMOL/L — SIGNIFICANT CHANGE UP (ref 135–145)
SPECIMEN SOURCE: SIGNIFICANT CHANGE UP
WBC # BLD: 4.41 K/UL — SIGNIFICANT CHANGE UP (ref 3.8–10.5)
WBC # FLD AUTO: 4.41 K/UL — SIGNIFICANT CHANGE UP (ref 3.8–10.5)

## 2019-09-01 PROCEDURE — 71045 X-RAY EXAM CHEST 1 VIEW: CPT | Mod: 26

## 2019-09-01 PROCEDURE — 99232 SBSQ HOSP IP/OBS MODERATE 35: CPT

## 2019-09-01 PROCEDURE — 99024 POSTOP FOLLOW-UP VISIT: CPT

## 2019-09-01 PROCEDURE — 93010 ELECTROCARDIOGRAM REPORT: CPT

## 2019-09-01 RX ORDER — COLCHICINE 0.6 MG
0.6 TABLET ORAL
Refills: 0 | Status: DISCONTINUED | OUTPATIENT
Start: 2019-09-01 | End: 2019-09-02

## 2019-09-01 RX ADMIN — SODIUM CHLORIDE 3 MILLILITER(S): 9 INJECTION INTRAMUSCULAR; INTRAVENOUS; SUBCUTANEOUS at 21:20

## 2019-09-01 RX ADMIN — SODIUM CHLORIDE 3 MILLILITER(S): 9 INJECTION INTRAMUSCULAR; INTRAVENOUS; SUBCUTANEOUS at 05:29

## 2019-09-01 RX ADMIN — OXYCODONE AND ACETAMINOPHEN 1 TABLET(S): 5; 325 TABLET ORAL at 09:02

## 2019-09-01 RX ADMIN — ATORVASTATIN CALCIUM 40 MILLIGRAM(S): 80 TABLET, FILM COATED ORAL at 21:22

## 2019-09-01 RX ADMIN — Medication 600 MILLIGRAM(S): at 21:51

## 2019-09-01 RX ADMIN — Medication 600 MILLIGRAM(S): at 15:15

## 2019-09-01 RX ADMIN — AMLODIPINE BESYLATE 5 MILLIGRAM(S): 2.5 TABLET ORAL at 05:34

## 2019-09-01 RX ADMIN — PANTOPRAZOLE SODIUM 40 MILLIGRAM(S): 20 TABLET, DELAYED RELEASE ORAL at 05:34

## 2019-09-01 RX ADMIN — Medication 0.6 MILLIGRAM(S): at 09:00

## 2019-09-01 RX ADMIN — Medication 600 MILLIGRAM(S): at 21:21

## 2019-09-01 RX ADMIN — Medication 600 MILLIGRAM(S): at 14:38

## 2019-09-01 RX ADMIN — OXYCODONE AND ACETAMINOPHEN 1 TABLET(S): 5; 325 TABLET ORAL at 09:45

## 2019-09-01 RX ADMIN — SODIUM CHLORIDE 3 MILLILITER(S): 9 INJECTION INTRAMUSCULAR; INTRAVENOUS; SUBCUTANEOUS at 14:20

## 2019-09-01 RX ADMIN — Medication 0.6 MILLIGRAM(S): at 21:22

## 2019-09-01 NOTE — PROGRESS NOTE ADULT - PROBLEM SELECTOR PLAN 1
s/p Pericardialcentesis for 500ml with Dr. Mueller 8/30.  Pericardial drain d/c'd by cardiology today.  Repeat TTE in AM per Dr. Mueller.  Colchicine increased to BID per cardiology.    Discussed with Dr. Alfaro.

## 2019-09-01 NOTE — PROGRESS NOTE ADULT - PROBLEM SELECTOR PLAN 1
colchicine 0.6 bid  will pull pericardial drain today  post echo will be ordered colchicine 0.6 bid  will pull pericardial drain today  post echo will be ordered  to date cytology pending gram stain negative culture neg afb fungus  tic studies negative

## 2019-09-01 NOTE — PROGRESS NOTE ADULT - ATTENDING COMMENTS
Patient seen and examined. For pericardiocentesis in the near future.  Repeat ECHO today shows early diastolic dysfunction of the RA/RV and unchanged amount of pericardial fluid.   I told the Patient if aspiration/drain placement does not help, she will need surgical intervention.    Patient states that her shortness of breath has not improved. She does not have chest pain.
Drain removed.   Plan for TTE tomorrow.   if stable, dc planning.
Patient stable. Will plan to remove drain tomorrow. INcrease colchicine to bid dosing.
Patient seen and examined. Appreciate Cardiology and ID  recommendations. Patient feels somewhat better today. Her vitals are stable. However, she is unable to lay flat and simple chores like taking a shower do make her short of breath.     I talked to the son, who is a physician at [employer].  I explained to him that there is no acute issues at the present time, no evidence of tamponade. If there was tamponade, Patient would need some sort of intervention. On talking to Dr. Grove, we will hold off any intervention as of now and follow her clinically.

## 2019-09-01 NOTE — PROGRESS NOTE ADULT - ASSESSMENT
73 yo F with progressive chest heaviness developed around noon today, pain is described as heaviness, 8/10 in severity, mostly left sided and radiating to back "under my left shoulder blade."  Admits to associated SOB and HA as well.  Symptoms become worse during exertion or when lying flat and subside with rest.  Checked in to [facility] for evaluation and CT chest and TTE confirmed "Moderate to large sized pericardial effusion  s/p pericardial drain  post echo with ivc still collapsable  for repeat cath today  Drained 5 cc over past hours

## 2019-09-01 NOTE — PROGRESS NOTE ADULT - SUBJECTIVE AND OBJECTIVE BOX
[facility]  [address], [place] [address]    REASON FOR VISIT:  Follow up on pericardial effusion  UPDATE:  Feels much better  chest pain improved did not required motrin; Drain 5 cc in past 12 hours  TELEMETRY MONITORING:  V paced    09-01    144  |  107  |  27.0<H>  ----------------------------<  113  4.1   |  27.0  |  1.14    Ca    9.2      01 Sep 2019 05:41      08-31-19 @ 07:01  -  09-01-19 @ 07:00  --------------------------------------------------------  IN: 1570 mL / OUT: 1270 mL / NET: 300 mL    MEDICATIONS  (STANDING):  amLODIPine   Tablet 5 milliGRAM(s) Oral daily  atorvastatin 40 milliGRAM(s) Oral at bedtime  colchicine 0.6 milliGRAM(s) Oral daily  ibuprofen  Tablet. 600 milliGRAM(s) Oral every 8 hours  pantoprazole    Tablet 40 milliGRAM(s) Oral before breakfast  polyethylene glycol 3350 17 Gram(s) Oral daily  sodium chloride 0.9% lock flush 3 milliLiter(s) IV Push every 8 hours    ROS:  No fever chills  Cardiac  No  sob or palp  cp improved  Resp  no cough no mucus production  Gi  no abd pain no melana  Ext No calf tenderness, no edema    Vital Signs Last 24 Hrs  T(C): 36.6 (01 Sep 2019 05:20), Max: 36.8 (31 Aug 2019 11:11)  T(F): 97.9 (01 Sep 2019 05:20), Max: 98.3 (31 Aug 2019 11:11)  HR: 62 (01 Sep 2019 07:16) (62 - 88)  BP: 156/78 (01 Sep 2019 05:20) (117/76 - 156/78)  BP(mean): --  RR: 16 (01 Sep 2019 07:16) (16 - 18)  SpO2: 97% (01 Sep 2019 07:16) (97% - 100%)  T(C): 36.6 (09-01-19 @ 05:20), Max: 36.8 (08-31-19 @ 11:11)  HR: 62 (09-01-19 @ 07:16) (62 - 88)  BP: 156/78 (09-01-19 @ 05:20) (117/76 - 156/78)  RR: 16 (09-01-19 @ 07:16) (16 - 18)  SpO2: 97% (09-01-19 @ 07:16) (97% - 100%)    HEENT Head atraumatic eomi, oral mucosa moist  CV S1&S2 regular  Pericardial drain in place  RESP  CTA  GI  Soft active bowel sounds non tender  EXT  No clubbing/Cyanosis /Edema  NEURO  Alert oriented  No gross motor or sensory deficits    < from: Transthoracic Echocardiogram (08.26.19 @ 18:41) >   Severe calcification of the mitral annulus and posterior mitral leaflet   with restriction in mitral leaflet excursion. Mean transmitral gradient   is   6 mmHg; Moderate mitral stenosis.   Mild (1+) mitral regurgitation is present.   Theaortic valve is not well visualized, appears moderately calcified.   Valve opening seems to be restricted.   Mild aortic stenosis is present.   Mild (1+) aortic regurgitation is present.   Normal appearing tricuspid valve structure and function.   Trace tricuspid valve regurgitation is present.   Pulmonic valve not well seen.   Trace pulmonic valvular regurgitation is present.   The left atrium is mildly dilated.   Estimated left ventricular ejection fraction is 60-65 %.   The left ventricle has normal wall motion and contractility as seen in   limited views.   Mild concentric left ventricular hypertrophy is present.   The right atrium is normal in size.   There is evidence of tamponade with diastolic collapse of the right   atrium.   Normal appearing right ventricle size.   There is evidence of tamponade with diastolic collapse of the right   ventricle.    A large pericardial effusion is present with echocardiographic evidence   of tamponade  tamponade.    < from: TTE Echo Limited or F/U (08.30.19 @ 09:03) >  Summary:   1. Left ventricular ejection fraction, by visual estimation, is >75%.   2. Technically good study.   3. Hyperdynamic global left ventricular systolic function.   4. Large pericardial effusion. This is unchanged from prior echo. There   is RA/RV early diastolic collapse, no significant MV/TV respirophasic   changes, the IVC is collapsible. High suspicion for tamponade physiology.   5. Peak transmitral mean gradient equals 7.0 mmHg, calculated mitral   valve area by pressure half time equals 1.58 cm² consistent with mild   mitral stenosis.   6. Severe mitral annular calcification.   7. Mild aortic regurgitation.   8. A prior echocardiogram from 8/27/2019 is available for comparison   purposes.   9. Mitral valve mean gradient is 7.0 mmHg consistent with moderate   mitral stenosis.

## 2019-09-01 NOTE — PROGRESS NOTE ADULT - ASSESSMENT
74 year old female with PMH HTN, HLD, L THR, bilat breast lumpectomy (declined further treatments per son), asthma, tick bite mid June 2019 (testing negative per son), 2nd degree heart block, s/p PPM insertion and pericardial window w/ biopsy (etiology unclear, negative for malignancy) on 7/5/19, ABIOLA during July 2019 admission.  Pt went to ED for evaluation d/t CP starting at approx 1130am described as feels like a heavy weight, is 8/10 (and unrelieved by morphine given at [facility] prior to transfer to [facility]), "heavy feeling" mostly to left back/subscapula region. Lying flat exacerbates the pain. + exercise intolerance (ex: gets winded when showering over past few days). + HA (7/10, "pressure", worse when lifting something). Denies photophobia, blurry or double vision, syncope, trauma, neck pain or stiffness, numbness/tingling, fever/chills, palpitations, diaphoresis, N/V, abd pain, other c/o. (26 Aug 2019 23:17)    Repeat echo at [facility] reveals pericardial effusion, no tamponade physiology.  Repeat TTE in AM 8/30> pericardialcentesis with Dr. Mueller.

## 2019-09-01 NOTE — PROGRESS NOTE ADULT - SUBJECTIVE AND OBJECTIVE BOX
Subjective: Pt sitting up in chair.  Denies SOB or CP.  NAD noted.      Tele:  Paced                        T(F): 98.2 (19 @ 10:00), Max: 98.2 (19 @ 10:00)  HR: 75 (19 @ 15:00) (62 - 75)  BP: 145/75 (19 @ 10:00) (140/72 - 156/78)  RR: 16 (19 @ 15:00) (16 - 18)  SpO2: 100% (19 @ 15:00) (97% - 100%) on room air at rest.      Daily     Daily Weight in k (01 Sep 2019 05:35)      No Known Drug Allergies        144  |  107  |  27.0<H>  ----------------------------<  113  4.1   |  27.0  |  1.14    Ca    9.2      01 Sep 2019 05:41                                 11.3   4.41  )-----------( 246      ( 01 Sep 2019 05:41 )             36.5         CXR: < from: Xray Chest 1 View- PORTABLE-Routine (19 @ 05:27) >  EXAM:  XR CHEST PORTABLE ROUTINE 1V                          PROCEDURE DATE:  2019          INTERPRETATION:  Clinical information: Pericardial effusion.    Portable AP radiograph of the chest was performed.    Comparison: 2019 at 5:19 AM    The heart is enlarged. There is a small left pleural effusion. There is a   left anterior chest wall permanent pacemaker. Right lung is clear.    Impression:    Small left pleural effusion.    Cardiomegaly.    TANYA CARBAJAL M.D., ATTENDING RADIOLOGIST  This document has been electronically signed. Sep  1 2019 10:39AM    < end of copied text >      I&O's Detail    31 Aug 2019 07:  -  01 Sep 2019 07:00  --------------------------------------------------------  IN:    Oral Fluid: 1570 mL  Total IN: 1570 mL    OUT:    Drain: 20 mL    Voided: 1250 mL  Total OUT: 1270 mL    Total NET: 300 mL      Pericardial drain: 20 ml over the last 24 hours.      Active Medications:  amLODIPine   Tablet 5 milliGRAM(s) Oral daily  atorvastatin 40 milliGRAM(s) Oral at bedtime  colchicine 0.6 milliGRAM(s) Oral two times a day  ibuprofen  Tablet. 600 milliGRAM(s) Oral every 8 hours  oxyCODONE    5 mG/acetaminophen 325 mG 1 Tablet(s) Oral every 6 hours PRN  pantoprazole    Tablet 40 milliGRAM(s) Oral before breakfast  polyethylene glycol 3350 17 Gram(s) Oral daily  sodium chloride 0.9% lock flush 3 milliLiter(s) IV Push every 8 hours      Physical Exam:    Neuro: AAOX3.  Non focal    Pulm: Diminished BLL.    CV: RRR.  +S1+S2.    Abd: Soft/NT/ND.  +BS.     Extremities: No edema.  +pp.    Incision(s): Pericardial drain site with dsd c/d/i.                     PAST MEDICAL & SURGICAL HISTORY:  History of second degree heart block  Pericardial effusion  Right knee pain, unspecified chronicity  High cholesterol  Mild intermittent asthma, unspecified whether complicated  History of lumpectomy: left and right breast  History of total left hip replacement  Hypertension, unspecified type  H/O total hip arthroplasty, left  S/P pericardial window creation  Cardiac pacemaker

## 2019-09-01 NOTE — PROGRESS NOTE ADULT - PROBLEM SELECTOR PROBLEM 1
Pericardial effusion with cardiac tamponade
Pericardial effusion without cardiac tamponade
Pericardial effusion with cardiac tamponade
Pericardial effusion without cardiac tamponade

## 2019-09-02 ENCOUNTER — TRANSCRIPTION ENCOUNTER (OUTPATIENT)
Age: 74
End: 2019-09-02

## 2019-09-02 VITALS — DIASTOLIC BLOOD PRESSURE: 75 MMHG | SYSTOLIC BLOOD PRESSURE: 161 MMHG

## 2019-09-02 DIAGNOSIS — R51 HEADACHE: ICD-10-CM

## 2019-09-02 LAB
ALBUMIN SERPL ELPH-MCNC: 3.3 G/DL — SIGNIFICANT CHANGE UP (ref 3.3–5.2)
ALP SERPL-CCNC: 185 U/L — HIGH (ref 40–120)
ALT FLD-CCNC: 50 U/L — HIGH
ANION GAP SERPL CALC-SCNC: 11 MMOL/L — SIGNIFICANT CHANGE UP (ref 5–17)
AST SERPL-CCNC: 32 U/L — HIGH
BILIRUB SERPL-MCNC: 0.2 MG/DL — LOW (ref 0.4–2)
BUN SERPL-MCNC: 29 MG/DL — HIGH (ref 8–20)
CALCIUM SERPL-MCNC: 9.2 MG/DL — SIGNIFICANT CHANGE UP (ref 8.6–10.2)
CHLORIDE SERPL-SCNC: 105 MMOL/L — SIGNIFICANT CHANGE UP (ref 98–107)
CO2 SERPL-SCNC: 26 MMOL/L — SIGNIFICANT CHANGE UP (ref 22–29)
CREAT SERPL-MCNC: 1.16 MG/DL — SIGNIFICANT CHANGE UP (ref 0.5–1.3)
GLUCOSE SERPL-MCNC: 113 MG/DL — SIGNIFICANT CHANGE UP (ref 70–115)
HCT VFR BLD CALC: 37 % — SIGNIFICANT CHANGE UP (ref 34.5–45)
HGB BLD-MCNC: 11.3 G/DL — LOW (ref 11.5–15.5)
MAGNESIUM SERPL-MCNC: 2.1 MG/DL — SIGNIFICANT CHANGE UP (ref 1.6–2.6)
MCHC RBC-ENTMCNC: 27.7 PG — SIGNIFICANT CHANGE UP (ref 27–34)
MCHC RBC-ENTMCNC: 30.5 GM/DL — LOW (ref 32–36)
MCV RBC AUTO: 90.7 FL — SIGNIFICANT CHANGE UP (ref 80–100)
PHOSPHATE SERPL-MCNC: 3.1 MG/DL — SIGNIFICANT CHANGE UP (ref 2.4–4.7)
PLATELET # BLD AUTO: 262 K/UL — SIGNIFICANT CHANGE UP (ref 150–400)
POTASSIUM SERPL-MCNC: 4.1 MMOL/L — SIGNIFICANT CHANGE UP (ref 3.5–5.3)
POTASSIUM SERPL-SCNC: 4.1 MMOL/L — SIGNIFICANT CHANGE UP (ref 3.5–5.3)
PROT SERPL-MCNC: 6.7 G/DL — SIGNIFICANT CHANGE UP (ref 6.6–8.7)
RBC # BLD: 4.08 M/UL — SIGNIFICANT CHANGE UP (ref 3.8–5.2)
RBC # FLD: 12.5 % — SIGNIFICANT CHANGE UP (ref 10.3–14.5)
SODIUM SERPL-SCNC: 142 MMOL/L — SIGNIFICANT CHANGE UP (ref 135–145)
WBC # BLD: 3.93 K/UL — SIGNIFICANT CHANGE UP (ref 3.8–10.5)
WBC # FLD AUTO: 3.93 K/UL — SIGNIFICANT CHANGE UP (ref 3.8–10.5)

## 2019-09-02 PROCEDURE — 71045 X-RAY EXAM CHEST 1 VIEW: CPT | Mod: 26

## 2019-09-02 PROCEDURE — 99024 POSTOP FOLLOW-UP VISIT: CPT

## 2019-09-02 PROCEDURE — 99232 SBSQ HOSP IP/OBS MODERATE 35: CPT

## 2019-09-02 RX ORDER — PANTOPRAZOLE SODIUM 20 MG/1
1 TABLET, DELAYED RELEASE ORAL
Qty: 14 | Refills: 0
Start: 2019-09-02 | End: 2019-09-15

## 2019-09-02 RX ORDER — IBUPROFEN 200 MG
1 TABLET ORAL
Qty: 30 | Refills: 0
Start: 2019-09-02 | End: 2019-09-11

## 2019-09-02 RX ORDER — POLYETHYLENE GLYCOL 3350 17 G/17G
17 POWDER, FOR SOLUTION ORAL
Qty: 0 | Refills: 0 | DISCHARGE
Start: 2019-09-02

## 2019-09-02 RX ORDER — COLCHICINE 0.6 MG
1 TABLET ORAL
Qty: 30 | Refills: 2
Start: 2019-09-02 | End: 2019-11-30

## 2019-09-02 RX ADMIN — Medication 0.6 MILLIGRAM(S): at 05:46

## 2019-09-02 RX ADMIN — SODIUM CHLORIDE 3 MILLILITER(S): 9 INJECTION INTRAMUSCULAR; INTRAVENOUS; SUBCUTANEOUS at 05:43

## 2019-09-02 RX ADMIN — AMLODIPINE BESYLATE 5 MILLIGRAM(S): 2.5 TABLET ORAL at 05:45

## 2019-09-02 RX ADMIN — Medication 600 MILLIGRAM(S): at 13:57

## 2019-09-02 RX ADMIN — PANTOPRAZOLE SODIUM 40 MILLIGRAM(S): 20 TABLET, DELAYED RELEASE ORAL at 05:45

## 2019-09-02 RX ADMIN — SODIUM CHLORIDE 3 MILLILITER(S): 9 INJECTION INTRAMUSCULAR; INTRAVENOUS; SUBCUTANEOUS at 12:05

## 2019-09-02 RX ADMIN — Medication 600 MILLIGRAM(S): at 05:45

## 2019-09-02 RX ADMIN — Medication 600 MILLIGRAM(S): at 06:15

## 2019-09-02 NOTE — PROGRESS NOTE ADULT - PROVIDER SPECIALTY LIST ADULT
CT Surgery
Cardiology
Infectious Disease
Cardiology
Thoracic Surgery
Thoracic Surgery
Cardiology

## 2019-09-02 NOTE — DISCHARGE NOTE NURSING/CASE MANAGEMENT/SOCIAL WORK - PATIENT PORTAL LINK FT
You can access the FollowMyHealth Patient Portal offered by [facility] by registering at the following website: [email or web address]. By joining [employer]’s FollowMyHealth portal, you will also be able to view your health information using other applications (apps) compatible with our system.

## 2019-09-02 NOTE — PROGRESS NOTE ADULT - REASON FOR ADMISSION
pericardial effusion with tampoande physiology on echo
Pericardial effusion with tamponade physiology on echo
pericardial effusion with tampoande physiology on echo
pericardial effusion with tamponade physiology on echo
pericardial effusion with tampoande physiology on echo
pericardial effusion
pericardial effusion with tampoande physiology on echo
pericardial effusion with tampoande physiology on echo
pericardial effusion with tamponade physiology on echo
pericardial effusion with tampoande physiology on echo

## 2019-09-02 NOTE — DISCHARGE NOTE PROVIDER - HOSPITAL COURSE
74 year old female with PMH HTN, HLD, L THR, bilat breast lumpectomy (declined further treatments per son), asthma, tick bite mid June 2019 (testing negative per son), 2nd degree heart block, s/p PPM insertion and pericardial window w/ biopsy (etiology unclear, negative for malignancy) on 7/5/19, ABIOLA during July 2019 admission.    Pt went to ED for evaluation d/t CP starting at approx 1130am described as feels like a heavy weight, is 8/10 (and unrelieved by morphine given at [facility] prior to transfer to [facility]), "heavy feeling" mostly to left back/subscapula region. Lying flat exacerbates the pain. + exercise intolerance (ex: gets winded when showering over past few days). + HA (7/10, "pressure", worse when lifting something). Denies photophobia, blurry or double vision, syncope, trauma, neck pain or stiffness, numbness/tingling, fever/chills, palpitations, diaphoresis, N/V, abd pain, other c/o. (26 Aug 2019 23:17)        Repeat echo at [facility] reveals pericardial effusion, no tamponade physiology.  Repeat TTE in AM 8/30> pericardialcentesis with Dr. Mueller.

## 2019-09-02 NOTE — DISCHARGE NOTE PROVIDER - CARE PROVIDER_API CALL
Dejan Wilhelm (MD)  Cardiovascular Disease; Internal Medicine  [address], [address]  [place] [address]  Phone: [phone]  Fax: [phone]  Follow Up Time: 1 week

## 2019-09-02 NOTE — DISCHARGE NOTE PROVIDER - NSDCPNSUBOBJ_GEN_ALL_CORE
Subjective: "I want to go home!" NAD seated in chair, family at bedside.        Vital Signs:    Vital Signs Last 24 Hrs    T(C): 36.8 (09-02-19 @ 10:30), Max: 36.8 (09-02-19 @ 10:30)    T(F): 98.2 (09-02-19 @ 10:30), Max: 98.2 (09-02-19 @ 10:30)    HR: 76 (09-02-19 @ 10:30) (73 - 81)    BP: 159/69 (09-02-19 @ 10:30) (147/64 - 159/79)    RR: 16 (09-02-19 @ 07:17) (16 - 18)    SpO2: 96% (09-02-19 @ 10:30) (96% - 100%) on (RA        Telemetry/Alarms:  60        Relevant labs, radiology and Medications reviewed        Pertinent Physical Exam    CTA    faint murmur, regular rate/rhythm    abd soft, NT    no LE edema        09-01 @ 07:01  -  09-02 @ 07:00    --------------------------------------------------------    IN:      Oral Fluid: 60 mL    Total IN: 60 mL        OUT:    Total OUT: 0 mL        Total NET: 60 mL

## 2019-09-02 NOTE — DISCHARGE NOTE PROVIDER - NSDCFUADDINST_GEN_ALL_CORE_FT
Please call the Cardiothoracic Surgery office at [phone] if you are experiencing any shortness of breath, chest pain, fevers or chills, drainage from the incisions, persistent nausea, vomiting or if you have any questions about your medications. If the symptoms are severe, call 911 and go to the nearest hospital. You can also call [phone] for an emergency Health system ambulance, which will take you to the closest [facility].    If you need any assistance for making any appointments for a new consult or referral in any specialty, please call our Health system Clinical Coordination Center at [phone].

## 2019-09-02 NOTE — DISCHARGE NOTE PROVIDER - NSDCCPCAREPLAN_GEN_ALL_CORE_FT
PRINCIPAL DISCHARGE DIAGNOSIS  Diagnosis: Pericardial effusion without cardiac tamponade  Assessment and Plan of Treatment: Take all medications as prescribed. Please make an appointment to follow up with Dr Wilhelm for Sept 12 or 13th for a repeat echocardiogram. Shower daily.      SECONDARY DISCHARGE DIAGNOSES  Diagnosis: ABIOLA (acute kidney injury)  Assessment and Plan of Treatment: improved. Colchicine take only once daily. Follow up outpatient with your primary care doctor within a month.    Diagnosis: Hypertension, unspecified type  Assessment and Plan of Treatment: Take all medications as prescribed.    Diagnosis: Headache  Assessment and Plan of Treatment: Headache

## 2019-09-02 NOTE — PROGRESS NOTE ADULT - SUBJECTIVE AND OBJECTIVE BOX
CHIEF COMPLAINT:Patient is a 74y old  Female who presents with a chief complaint of pericardial effusion with tampoande physiology on echo (01 Sep 2019 15:09)    INTERVAL HISTORY:  pt seen and examined.   S/P removal of pericardial drain.  Doing well. She still feels short of breath but to a lesser degree.   No syncope or presyncope. Left shoulder pain is improved.     Allergies  No Known Drug Allergies  Poison Ivy (Unknown)  	  MEDICATIONS:  amLODIPine   Tablet 5 milliGRAM(s) Oral daily  ibuprofen  Tablet. 600 milliGRAM(s) Oral every 8 hours  oxyCODONE    5 mG/acetaminophen 325 mG 1 Tablet(s) Oral every 6 hours PRN  pantoprazole    Tablet 40 milliGRAM(s) Oral before breakfast  polyethylene glycol 3350 17 Gram(s) Oral daily  atorvastatin 40 milliGRAM(s) Oral at bedtime  colchicine 0.6 milliGRAM(s) Oral two times a day  sodium chloride 0.9% lock flush 3 milliLiter(s) IV Push every 8 hours    PHYSICAL EXAM:  T(C): 36.8 (09-02-19 @ 10:30), Max: 36.8 (09-02-19 @ 10:30)  HR: 76 (09-02-19 @ 10:30) (73 - 81)  BP: 159/69 (09-02-19 @ 10:30) (147/64 - 159/79)  RR: 16 (09-02-19 @ 07:17) (16 - 18)  SpO2: 96% (09-02-19 @ 10:30) (96% - 100%)  I&O's Summary    01 Sep 2019 07:01  -  02 Sep 2019 07:00  --------------------------------------------------------  IN: 60 mL / OUT: 0 mL / NET: 60 mL    Appearance: Normal	  HEENT:   NC/AT  Eye: Pink Conjunctiva  Lungs: CTA B/L  CVS: RRR, Normal S1 and S2, No Edema  Pulses: Normal distal pulses.  Neuro: A&O x3    TELEMETRY: paced rhythm    LABS:	 	                       11.3   3.93  )-----------( 262      ( 02 Sep 2019 06:26 )             37.0     09-02    142  |  105  |  29.0<H>  ----------------------------<  113  4.1   |  26.0  |  1.16    Ca    9.2      02 Sep 2019 06:26  Phos  3.1     09-02  Mg     2.1     09-02    TPro  6.7  /  Alb  3.3  /  TBili  0.2<L>  /  DBili  x   /  AST  32<H>  /  ALT  50<H>  /  AlkPhos  185<H>  09-02    ASSESSMENT/PLAN: 	  Repeat TTE today with trace pericardial effusion.   Pt is on NSAIDs, cont for 7-10 days more.   Decrease colchicine to 0.6 mg daily based on Crcl.   Fluid cultures and serology reviewed as well  Repeat echo at the end of next week by her cardiologist.  FU with cardiologist as well.  Increase norvasc to 10 mg daily for better BP   Stable for D/C from our standpoint.   I will sign off , please call if needed.  D/W CTU-PA.   Thank you for allowing me to contribute to your patient's care.

## 2019-09-05 ENCOUNTER — TRANSCRIPTION ENCOUNTER (OUTPATIENT)
Age: 74
End: 2019-09-05

## 2019-09-05 LAB
CULTURE RESULTS: SIGNIFICANT CHANGE UP
NON-GYNECOLOGICAL CYTOLOGY STUDY: SIGNIFICANT CHANGE UP
SPECIMEN SOURCE: SIGNIFICANT CHANGE UP

## 2019-09-13 LAB — VIRUS SPEC CULT: SIGNIFICANT CHANGE UP

## 2019-09-24 LAB
CULTURE RESULTS: SIGNIFICANT CHANGE UP
SPECIMEN SOURCE: SIGNIFICANT CHANGE UP

## 2019-09-29 PROCEDURE — 87205 SMEAR GRAM STAIN: CPT

## 2019-09-29 PROCEDURE — 87070 CULTURE OTHR SPECIMN AEROBIC: CPT

## 2019-09-29 PROCEDURE — 84100 ASSAY OF PHOSPHORUS: CPT

## 2019-09-29 PROCEDURE — 87206 SMEAR FLUORESCENT/ACID STAI: CPT

## 2019-09-29 PROCEDURE — 88112 CYTOPATH CELL ENHANCE TECH: CPT

## 2019-09-29 PROCEDURE — 93005 ELECTROCARDIOGRAM TRACING: CPT

## 2019-09-29 PROCEDURE — 80053 COMPREHEN METABOLIC PANEL: CPT

## 2019-09-29 PROCEDURE — 84436 ASSAY OF TOTAL THYROXINE: CPT

## 2019-09-29 PROCEDURE — 82550 ASSAY OF CK (CPK): CPT

## 2019-09-29 PROCEDURE — 86923 COMPATIBILITY TEST ELECTRIC: CPT

## 2019-09-29 PROCEDURE — 86480 TB TEST CELL IMMUN MEASURE: CPT

## 2019-09-29 PROCEDURE — 87116 MYCOBACTERIA CULTURE: CPT

## 2019-09-29 PROCEDURE — 33010: CPT

## 2019-09-29 PROCEDURE — 88305 TISSUE EXAM BY PATHOLOGIST: CPT

## 2019-09-29 PROCEDURE — 84443 ASSAY THYROID STIM HORMONE: CPT

## 2019-09-29 PROCEDURE — 84157 ASSAY OF PROTEIN OTHER: CPT

## 2019-09-29 PROCEDURE — 93306 TTE W/DOPPLER COMPLETE: CPT

## 2019-09-29 PROCEDURE — 93308 TTE F-UP OR LMTD: CPT

## 2019-09-29 PROCEDURE — 70450 CT HEAD/BRAIN W/O DYE: CPT

## 2019-09-29 PROCEDURE — 86703 HIV-1/HIV-2 1 RESULT ANTBDY: CPT

## 2019-09-29 PROCEDURE — 82945 GLUCOSE OTHER FLUID: CPT

## 2019-09-29 PROCEDURE — 83615 LACTATE (LD) (LDH) ENZYME: CPT

## 2019-09-29 PROCEDURE — 99285 EMERGENCY DEPT VISIT HI MDM: CPT

## 2019-09-29 PROCEDURE — 81001 URINALYSIS AUTO W/SCOPE: CPT

## 2019-09-29 PROCEDURE — C1769: CPT

## 2019-09-29 PROCEDURE — 86900 BLOOD TYPING SEROLOGIC ABO: CPT

## 2019-09-29 PROCEDURE — 87075 CULTR BACTERIA EXCEPT BLOOD: CPT

## 2019-09-29 PROCEDURE — 84484 ASSAY OF TROPONIN QUANT: CPT

## 2019-09-29 PROCEDURE — C1894: CPT

## 2019-09-29 PROCEDURE — 87252 VIRUS INOCULATION TISSUE: CPT

## 2019-09-29 PROCEDURE — 89051 BODY FLUID CELL COUNT: CPT

## 2019-09-29 PROCEDURE — 36415 COLL VENOUS BLD VENIPUNCTURE: CPT

## 2019-09-29 PROCEDURE — 88312 SPECIAL STAINS GROUP 1: CPT

## 2019-09-29 PROCEDURE — 99153 MOD SED SAME PHYS/QHP EA: CPT

## 2019-09-29 PROCEDURE — 86901 BLOOD TYPING SEROLOGIC RH(D): CPT

## 2019-09-29 PROCEDURE — 86618 LYME DISEASE ANTIBODY: CPT

## 2019-09-29 PROCEDURE — 99152 MOD SED SAME PHYS/QHP 5/>YRS: CPT

## 2019-09-29 PROCEDURE — 85027 COMPLETE CBC AUTOMATED: CPT

## 2019-09-29 PROCEDURE — 87102 FUNGUS ISOLATION CULTURE: CPT

## 2019-09-29 PROCEDURE — 82042 OTHER SOURCE ALBUMIN QUAN EA: CPT

## 2019-09-29 PROCEDURE — 84134 ASSAY OF PREALBUMIN: CPT

## 2019-09-29 PROCEDURE — 83036 HEMOGLOBIN GLYCOSYLATED A1C: CPT

## 2019-09-29 PROCEDURE — 71045 X-RAY EXAM CHEST 1 VIEW: CPT

## 2019-09-29 PROCEDURE — 86850 RBC ANTIBODY SCREEN: CPT

## 2019-09-29 PROCEDURE — 83735 ASSAY OF MAGNESIUM: CPT

## 2019-09-29 PROCEDURE — 87015 SPECIMEN INFECT AGNT CONCNTJ: CPT

## 2019-09-29 PROCEDURE — 84480 ASSAY TRIIODOTHYRONINE (T3): CPT

## 2019-09-29 PROCEDURE — 80048 BASIC METABOLIC PNL TOTAL CA: CPT

## 2019-10-23 ENCOUNTER — APPOINTMENT (OUTPATIENT)
Dept: ELECTROPHYSIOLOGY | Facility: CLINIC | Age: 74
End: 2019-10-23

## 2019-10-23 LAB
CULTURE RESULTS: SIGNIFICANT CHANGE UP
SPECIMEN SOURCE: SIGNIFICANT CHANGE UP

## 2019-10-29 PROBLEM — I31.3 PERICARDIAL EFFUSION (NONINFLAMMATORY): Chronic | Status: ACTIVE | Noted: 2019-08-27

## 2019-10-29 PROBLEM — Z86.79 PERSONAL HISTORY OF OTHER DISEASES OF THE CIRCULATORY SYSTEM: Chronic | Status: ACTIVE | Noted: 2019-08-27

## 2020-01-15 DIAGNOSIS — E78.00 PURE HYPERCHOLESTEROLEMIA, UNSPECIFIED: ICD-10-CM

## 2020-01-21 ENCOUNTER — APPOINTMENT (OUTPATIENT)
Dept: ELECTROPHYSIOLOGY | Facility: CLINIC | Age: 75
End: 2020-01-21
Payer: MEDICARE

## 2020-01-21 VITALS
HEART RATE: 69 BPM | HEIGHT: 62 IN | OXYGEN SATURATION: 100 % | WEIGHT: 146 LBS | SYSTOLIC BLOOD PRESSURE: 159 MMHG | BODY MASS INDEX: 26.87 KG/M2 | DIASTOLIC BLOOD PRESSURE: 76 MMHG

## 2020-01-21 DIAGNOSIS — Z82.49 FAMILY HISTORY OF ISCHEMIC HEART DISEASE AND OTHER DISEASES OF THE CIRCULATORY SYSTEM: ICD-10-CM

## 2020-01-21 PROCEDURE — 93280 PM DEVICE PROGR EVAL DUAL: CPT

## 2020-01-21 RX ORDER — ALIROCUMAB 75 MG/ML
INJECTION, SOLUTION SUBCUTANEOUS
Refills: 0 | Status: ACTIVE | COMMUNITY

## 2020-01-21 RX ORDER — MENTHOL/CAMPHOR 0.5 %-0.5%
1000 LOTION (ML) TOPICAL
Refills: 0 | Status: ACTIVE | COMMUNITY

## 2020-01-21 RX ORDER — FLUTICASONE FUROATE AND VILANTEROL TRIFENATATE 50; 25 UG/1; UG/1
POWDER RESPIRATORY (INHALATION)
Refills: 0 | Status: ACTIVE | COMMUNITY

## 2020-04-21 ENCOUNTER — APPOINTMENT (OUTPATIENT)
Dept: ELECTROPHYSIOLOGY | Facility: CLINIC | Age: 75
End: 2020-04-21
Payer: MEDICARE

## 2020-04-21 PROCEDURE — 93294 REM INTERROG EVL PM/LDLS PM: CPT

## 2020-04-21 PROCEDURE — 93296 REM INTERROG EVL PM/IDS: CPT

## 2020-06-08 NOTE — PROVIDER CONTACT NOTE (OTHER) - DATE AND TIME:
04-Jul-2019 11:42
04-Jul-2019 09:05
04-Jul-2019 09:21
04-Jul-2019 11:23
04-Jul-2019 18:35
08-Jul-2019 13:51
98

## 2020-07-21 ENCOUNTER — APPOINTMENT (OUTPATIENT)
Dept: ELECTROPHYSIOLOGY | Facility: CLINIC | Age: 75
End: 2020-07-21
Payer: MEDICARE

## 2020-07-21 PROCEDURE — 93296 REM INTERROG EVL PM/IDS: CPT

## 2020-07-21 PROCEDURE — 93294 REM INTERROG EVL PM/LDLS PM: CPT

## 2020-08-10 ENCOUNTER — APPOINTMENT (OUTPATIENT)
Dept: ELECTROPHYSIOLOGY | Facility: CLINIC | Age: 75
End: 2020-08-10
Payer: MEDICARE

## 2020-08-10 VITALS
HEIGHT: 62 IN | DIASTOLIC BLOOD PRESSURE: 61 MMHG | BODY MASS INDEX: 28.52 KG/M2 | WEIGHT: 155 LBS | HEART RATE: 66 BPM | SYSTOLIC BLOOD PRESSURE: 144 MMHG | OXYGEN SATURATION: 99 %

## 2020-08-10 DIAGNOSIS — I31.3 PERICARDIAL EFFUSION (NONINFLAMMATORY): ICD-10-CM

## 2020-08-10 PROCEDURE — 93280 PM DEVICE PROGR EVAL DUAL: CPT

## 2020-08-11 RX ORDER — HYDROCHLOROTHIAZIDE 12.5 MG/1
12.5 TABLET ORAL
Refills: 0 | Status: ACTIVE | COMMUNITY

## 2020-08-11 RX ORDER — METOPROLOL SUCCINATE 25 MG/1
25 TABLET, EXTENDED RELEASE ORAL
Refills: 0 | Status: ACTIVE | COMMUNITY

## 2020-08-11 RX ORDER — SPIRONOLACTONE 25 MG/1
25 TABLET, FILM COATED ORAL DAILY
Refills: 0 | Status: ACTIVE | COMMUNITY

## 2020-08-11 RX ORDER — HYDROXYCHLOROQUINE SULFATE 200 MG/1
200 TABLET, FILM COATED ORAL
Refills: 0 | Status: ACTIVE | COMMUNITY

## 2020-09-09 ENCOUNTER — APPOINTMENT (OUTPATIENT)
Dept: ELECTROPHYSIOLOGY | Facility: CLINIC | Age: 75
End: 2020-09-09

## 2020-11-08 ENCOUNTER — APPOINTMENT (OUTPATIENT)
Dept: ELECTROPHYSIOLOGY | Facility: CLINIC | Age: 75
End: 2020-11-08
Payer: MEDICARE

## 2020-11-09 PROCEDURE — 93294 REM INTERROG EVL PM/LDLS PM: CPT

## 2020-11-09 PROCEDURE — 93296 REM INTERROG EVL PM/IDS: CPT

## 2021-02-09 ENCOUNTER — APPOINTMENT (OUTPATIENT)
Dept: ELECTROPHYSIOLOGY | Facility: CLINIC | Age: 76
End: 2021-02-09
Payer: MEDICARE

## 2021-02-10 PROCEDURE — 93294 REM INTERROG EVL PM/LDLS PM: CPT

## 2021-02-10 PROCEDURE — 93296 REM INTERROG EVL PM/IDS: CPT

## 2021-02-12 ENCOUNTER — NON-APPOINTMENT (OUTPATIENT)
Age: 76
End: 2021-02-12

## 2021-03-19 NOTE — ED ADULT NURSE NOTE - CHIEF COMPLAINT QUOTE
pt co chest pain sicne 1130am, with SOB and dizziness when bending down and bending back up. pt deneis weakness. pt states she has hx of peridcardial effusion and PPm as well as elevated creatinine. PAST MEDICAL HISTORY:  No pertinent past medical history

## 2021-05-12 ENCOUNTER — APPOINTMENT (OUTPATIENT)
Dept: ELECTROPHYSIOLOGY | Facility: CLINIC | Age: 76
End: 2021-05-12
Payer: MEDICARE

## 2021-05-13 ENCOUNTER — NON-APPOINTMENT (OUTPATIENT)
Age: 76
End: 2021-05-13

## 2021-05-13 PROCEDURE — 93294 REM INTERROG EVL PM/LDLS PM: CPT

## 2021-05-13 PROCEDURE — 93296 REM INTERROG EVL PM/IDS: CPT

## 2021-05-28 ENCOUNTER — APPOINTMENT (OUTPATIENT)
Dept: MAMMOGRAPHY | Facility: CLINIC | Age: 76
End: 2021-05-28
Payer: MEDICARE

## 2021-05-28 ENCOUNTER — OUTPATIENT (OUTPATIENT)
Dept: OUTPATIENT SERVICES | Facility: HOSPITAL | Age: 76
LOS: 1 days | End: 2021-05-28
Payer: MEDICARE

## 2021-05-28 ENCOUNTER — APPOINTMENT (OUTPATIENT)
Dept: RADIOLOGY | Facility: CLINIC | Age: 76
End: 2021-05-28
Payer: MEDICARE

## 2021-05-28 DIAGNOSIS — Z96.642 PRESENCE OF LEFT ARTIFICIAL HIP JOINT: Chronic | ICD-10-CM

## 2021-05-28 DIAGNOSIS — Z95.0 PRESENCE OF CARDIAC PACEMAKER: Chronic | ICD-10-CM

## 2021-05-28 DIAGNOSIS — Z13.820 ENCOUNTER FOR SCREENING FOR OSTEOPOROSIS: ICD-10-CM

## 2021-05-28 DIAGNOSIS — Z98.890 OTHER SPECIFIED POSTPROCEDURAL STATES: Chronic | ICD-10-CM

## 2021-05-28 DIAGNOSIS — Z12.31 ENCOUNTER FOR SCREENING MAMMOGRAM FOR MALIGNANT NEOPLASM OF BREAST: ICD-10-CM

## 2021-05-28 PROCEDURE — 77080 DXA BONE DENSITY AXIAL: CPT

## 2021-05-28 PROCEDURE — 77063 BREAST TOMOSYNTHESIS BI: CPT | Mod: 26

## 2021-05-28 PROCEDURE — 77067 SCR MAMMO BI INCL CAD: CPT | Mod: 26

## 2021-05-28 PROCEDURE — 77063 BREAST TOMOSYNTHESIS BI: CPT

## 2021-05-28 PROCEDURE — 77080 DXA BONE DENSITY AXIAL: CPT | Mod: 26

## 2021-05-28 PROCEDURE — 77067 SCR MAMMO BI INCL CAD: CPT

## 2021-08-16 ENCOUNTER — NON-APPOINTMENT (OUTPATIENT)
Age: 76
End: 2021-08-16

## 2021-08-25 ENCOUNTER — APPOINTMENT (OUTPATIENT)
Dept: ELECTROPHYSIOLOGY | Facility: CLINIC | Age: 76
End: 2021-08-25
Payer: MEDICARE

## 2021-08-25 VITALS
BODY MASS INDEX: 29.63 KG/M2 | WEIGHT: 161 LBS | HEIGHT: 62 IN | DIASTOLIC BLOOD PRESSURE: 54 MMHG | SYSTOLIC BLOOD PRESSURE: 145 MMHG | OXYGEN SATURATION: 99 % | RESPIRATION RATE: 14 BRPM | HEART RATE: 57 BPM

## 2021-08-25 DIAGNOSIS — Z95.0 PRESENCE OF CARDIAC PACEMAKER: ICD-10-CM

## 2021-08-25 DIAGNOSIS — I49.5 SICK SINUS SYNDROME: ICD-10-CM

## 2021-08-25 PROCEDURE — 93280 PM DEVICE PROGR EVAL DUAL: CPT

## 2021-08-25 RX ORDER — PREDNISONE 1 MG/1
1 TABLET ORAL DAILY
Refills: 0 | Status: DISCONTINUED | COMMUNITY
End: 2021-08-25

## 2021-08-25 RX ORDER — TIOTROPIUM BROMIDE 18 UG/1
18 CAPSULE ORAL; RESPIRATORY (INHALATION)
Refills: 0 | Status: DISCONTINUED | COMMUNITY
End: 2021-08-25

## 2021-10-19 ENCOUNTER — OUTPATIENT (OUTPATIENT)
Dept: OUTPATIENT SERVICES | Facility: HOSPITAL | Age: 76
LOS: 1 days | End: 2021-10-19
Payer: MEDICARE

## 2021-10-19 ENCOUNTER — APPOINTMENT (OUTPATIENT)
Dept: ULTRASOUND IMAGING | Facility: CLINIC | Age: 76
End: 2021-10-19
Payer: MEDICARE

## 2021-10-19 DIAGNOSIS — Z98.890 OTHER SPECIFIED POSTPROCEDURAL STATES: Chronic | ICD-10-CM

## 2021-10-19 DIAGNOSIS — Z96.642 PRESENCE OF LEFT ARTIFICIAL HIP JOINT: Chronic | ICD-10-CM

## 2021-10-19 DIAGNOSIS — N93.9 ABNORMAL UTERINE AND VAGINAL BLEEDING, UNSPECIFIED: ICD-10-CM

## 2021-10-19 DIAGNOSIS — Z95.0 PRESENCE OF CARDIAC PACEMAKER: Chronic | ICD-10-CM

## 2021-10-19 PROCEDURE — 76856 US EXAM PELVIC COMPLETE: CPT

## 2021-10-19 PROCEDURE — 76856 US EXAM PELVIC COMPLETE: CPT | Mod: 26

## 2021-10-19 PROCEDURE — 76830 TRANSVAGINAL US NON-OB: CPT

## 2021-10-19 PROCEDURE — 76830 TRANSVAGINAL US NON-OB: CPT | Mod: 26

## 2021-11-09 ENCOUNTER — APPOINTMENT (OUTPATIENT)
Dept: ELECTROPHYSIOLOGY | Facility: CLINIC | Age: 76
End: 2021-11-09
Payer: MEDICARE

## 2021-11-10 ENCOUNTER — NON-APPOINTMENT (OUTPATIENT)
Age: 76
End: 2021-11-10

## 2021-11-10 PROCEDURE — 93296 REM INTERROG EVL PM/IDS: CPT | Mod: NC

## 2021-11-10 PROCEDURE — 93294 REM INTERROG EVL PM/LDLS PM: CPT

## 2021-11-22 NOTE — CONSULT NOTE ADULT - PROBLEM SELECTOR RECOMMENDATION 3
No
continue antihypertensives cautiously in setting of pericardial effusion with tamponade physiology on echo
continue antihypertensives cautiously in setting of pericardial effusion with tamponade physiology on echo
Low cholesterol diet, Statin therapy, FLP in AM  case d/w Dr. Grove

## 2022-02-08 ENCOUNTER — APPOINTMENT (OUTPATIENT)
Dept: ELECTROPHYSIOLOGY | Facility: CLINIC | Age: 77
End: 2022-02-08

## 2022-06-20 NOTE — PATIENT PROFILE ADULT - HARM RISK FACTORS
[place] [facility](APSO):    [facility]  [address] [address]  [phone]    ASSESSMENT & PLAN:  1. Numbness and tingling in both hands    2. Numbness of feet    3. Chronic left hip pain      Return in about 3 months (around 9/20/2022).  EMG bilat UE and LE.  Chronic pain hip stable.     The patient indicates understanding of these issues and agrees with the plan.       CHIEF COMPLAINT:   Chief Complaint   Patient presents with   • Follow-up     Nuumbness tingling in toes and finger bilaterally,started hands last time here, feet is long term discuss labs about this            HISTORY OF PRESENT ILLNESS:    She is complaining of/requesting evaluation for numbness and tingling in toes and fingers bilateral. She had tingling in toes saw an orthopedic foot doctor due to foot issues in 2020. She had injection in foot in 2020 December.  Numbness of great toe then.  Last few months tingling in all fingers.  Had prior carpal tunnel with release 20 years ago.  Fingers left index and middle, some tingling in all five digits bilateral.  Mostly big toes and middle toes gets numb.  Denies weakness arms or legs.  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      PAST HISTORIES:  I have reviewed the past medical history,  social history, medications and allergies listed in the medical record as obtained by my nursing staff and support staff and agree with their documentation.  ALLERGIES:   Allergen Reactions   • Ciprofloxacin ARTHRALGIA, MYALGIA and Other (See Comments)     Patient prefers not have this drug     Current Outpatient Medications   Medication Sig Dispense Refill   • HYDROcodone-acetaminophen (NORCO) 5-325 MG per tablet TAKE 1/2-1 TABLET BY MOUTH DAILY. MAY CAUSE DROWSINESS. 30 tablet 0   • hydroxychloroquine (PLAQUENIL) 200 MG tablet TAKE 1 TABLET BY MOUTH TWICE DAILY MONDAY TO FRIDAY AND NONE ON SATURDAY OR SUNDAY 126  tablet 3   • celecoxib (CeleBREX) 200 MG capsule Take 1 capsule by mouth daily. Prn pain with food 90 capsule 3   • naLOXone (Narcan) 4 MG/0.1ML nasal spray Spray the content of 1 device into 1 nostril. CALL 911  May repeat with 2nd device in alternate nostril if no response in 2-3 minutes. 2 each 0   • atorvastatin (LIPITOR) 40 MG tablet Take 1 tablet by mouth daily. (Patient taking differently: Take 40 mg by mouth daily. Patient states taking 20 mg daily) 90 tablet 3   • DULoxetine (CYMBALTA) 60 MG capsule Take 1 capsule by mouth daily. 30 capsule 11   • esomeprazole (NexIUM) 40 MG capsule Take 1 capsule by mouth daily (before breakfast). 30 capsule 2   • azelastine (ASTELIN) 0.1 % nasal spray Spray 2 sprays in each nostril 2 times daily. 90 mL 3   • ipratropium (ATROVENT) 0.06 % nasal spray Spray 2 sprays in each nostril 3 times daily. 15 mL 5   • folic acid (FOLATE) 1 MG tablet Take 2 tablets by mouth daily. 60 tablet 5   • SUMAtriptan (IMITREX) 50 MG tablet Take 1 tablet by mouth at onset of migraine. May repeat after 2 hours if needed. Do not use more than 3 times per week 27 tablet 0   • TURMERIC PO      • Cyanocobalamin (B-12 PO)      • Glucosamine-Chondroitin (MOVE FREE PO)      • Omega-3 Fatty Acids (OMEGA 3 PO) Take 1 tablet by mouth daily.      • Coenzyme Q10 (CO Q 10 PO) Take 1 capsule by mouth daily.     • acetaminophen (TYLENOL) 325 MG tablet Take 650 mg by mouth every 4 hours as needed for Pain.     • CALCIUM-MAGNESIUM-ZINC PO Take 1 tablet by mouth daily.     • polyethylene glycol (MIRALAX) powder Take 17 g by mouth daily. As needed.     • cholecalciferol (VITAMIN D3) 1000 UNITS tablet Take 10,000 Units by mouth daily.        No current facility-administered medications for this visit.     Past Medical History:   Diagnosis Date   • Allergy     see chart notes   • Anemia    • Arthritis    • Basal cell cancer     face x2   • Cervical dysplasia     1986, laser   • Contraception      vasectomy    • Depression    • Diplopia     prism glasses   • H/O motion sickness    • History of stomach ulcers    • Low back pain     mri    • Migraines     menstral   • Mitral regurgitation     echo 2007   • Other mixed anxiety disorders    • Palpitations    • PONV (postoperative nausea and vomiting)    • Refusal of blood transfusions as patient is [employer]    • Seropositive rheumatoid arthritis (CMS/HCC) 7/2/2019   • SI (sacroiliac) joint dysfunction 12/10/2015   • Syncope    • Tricuspid regurgitation     echo 2007     Past Surgical History:   Procedure Laterality Date   • Appendectomy     • Basal cell carcinoma excision      x2   • Carpal tunnel release     • Cervix surgery      laser   • Colonoscopy  01/15/2003   • Hernia repair     • Hip arthroscopy, dx      for impingement   • Knee arthroscopy w/ meniscal repair Right 03/08/2019   • Skin biopsy     • Tonsillectomy and adenoidectomy     • Total hip replacement Left 08/09/2017    Dr. Weiner       REVIEW OF SYSTEMS:  Except as stated above, the patient denies:  Headaches, diplopia, field cut, or swallowing difficulty. No chest pain, chest  pressure, shortness of breath, PND, orthopnea or hemoptysis. No nausea,  vomiting, diarrhea, constipation, melena, hematochezia or hematemesis. No  change in urination. No dysuria, frequency, hematuria, or urgency. No  joint swelling. No skin rash.     PHYSICAL EXAM:    Vitals:    06/20/22 1439   BP: 122/64   Pulse: 86   Temp: 98 °F (36.7 °C)   SpO2: 97%   Weight: 65.9 kg (145 lb 2.8 oz)   LMP: 09/22/2013     GENERAL:  Pleasant, Cooperative in no distress.  Color is good.    LOWER EXTREMITIES: No edema. Dorsalis pedis and posterior tibial pulses  are normal.    Positive PHalen's both wrists and positive Tinel's both wrists.  Diminished sensation to Monofilament right foot left intact and hand finger touch intact.    Intact strengths upper and lower extremities all groups.                    yes

## 2023-06-20 NOTE — PATIENT PROFILE ADULT - PSYCHOSOCIAL CONCERNS
Elidel Pregnancy And Lactation Text: This medication is Pregnancy Category C. It is unknown if this medication is excreted in breast milk. none

## 2023-10-10 NOTE — DISCHARGE NOTE PROVIDER - NSDCHOSPICE_GEN_A_CORE
No Topical Ketoconazole Pregnancy And Lactation Text: This medication is Pregnancy Category B and is considered safe during pregnancy. It is unknown if it is excreted in breast milk.